# Patient Record
Sex: MALE | Race: WHITE | NOT HISPANIC OR LATINO | Employment: OTHER | URBAN - NONMETROPOLITAN AREA
[De-identification: names, ages, dates, MRNs, and addresses within clinical notes are randomized per-mention and may not be internally consistent; named-entity substitution may affect disease eponyms.]

---

## 2021-12-23 ENCOUNTER — HISTORICAL (OUTPATIENT)
Dept: ADMINISTRATIVE | Facility: HOSPITAL | Age: 66
End: 2021-12-23

## 2023-01-26 ENCOUNTER — HOSPITAL ENCOUNTER (OUTPATIENT)
Dept: RADIOLOGY | Facility: HOSPITAL | Age: 68
Discharge: HOME | End: 2023-01-26
Attending: FAMILY MEDICINE
Payer: MEDICARE

## 2023-01-26 DIAGNOSIS — R15.9 ENCOPRESIS WITHOUT CONSTIPATION AND OVERFLOW INCONTINENCE: ICD-10-CM

## 2023-01-26 PROCEDURE — 74019 RADEX ABDOMEN 2 VIEWS: CPT | Mod: TC

## 2023-02-16 ENCOUNTER — HOSPITAL ENCOUNTER (OUTPATIENT)
Dept: RADIOLOGY | Facility: HOSPITAL | Age: 68
Discharge: HOME | End: 2023-02-16
Attending: SURGERY
Payer: MEDICARE

## 2023-02-16 DIAGNOSIS — I65.22 OCCLUSION OF LEFT CAROTID ARTERY: ICD-10-CM

## 2023-02-16 PROCEDURE — 25500020 PHARM REV CODE 255: Performed by: SURGERY

## 2023-02-16 PROCEDURE — 70498 CT ANGIOGRAPHY NECK: CPT | Mod: TC

## 2023-02-16 RX ADMIN — IOPAMIDOL 100 ML: 755 INJECTION, SOLUTION INTRAVENOUS at 10:02

## 2023-03-01 ENCOUNTER — CLINICAL SUPPORT (OUTPATIENT)
Dept: RESPIRATORY THERAPY | Facility: HOSPITAL | Age: 68
End: 2023-03-01
Attending: SURGERY
Payer: MEDICARE

## 2023-03-01 ENCOUNTER — HOSPITAL ENCOUNTER (OUTPATIENT)
Dept: RADIOLOGY | Facility: HOSPITAL | Age: 68
Discharge: HOME | End: 2023-03-01
Attending: SURGERY
Payer: MEDICARE

## 2023-03-01 DIAGNOSIS — I71.40 ABDOMINAL AORTIC ANEURYSM: ICD-10-CM

## 2023-03-01 DIAGNOSIS — Z01.810 PRE-OPERATIVE CARDIOVASCULAR EXAMINATION: ICD-10-CM

## 2023-03-01 DIAGNOSIS — I65.22 CAROTID STENOSIS, LEFT: ICD-10-CM

## 2023-03-01 DIAGNOSIS — N18.6 END STAGE RENAL DISEASE: ICD-10-CM

## 2023-03-01 PROCEDURE — 93010 ELECTROCARDIOGRAM REPORT: CPT | Mod: ,,, | Performed by: INTERNAL MEDICINE

## 2023-03-01 PROCEDURE — 93005 ELECTROCARDIOGRAM TRACING: CPT

## 2023-03-01 PROCEDURE — 71046 X-RAY EXAM CHEST 2 VIEWS: CPT | Mod: TC

## 2023-03-01 PROCEDURE — 93010 EKG 12-LEAD: ICD-10-PCS | Mod: ,,, | Performed by: INTERNAL MEDICINE

## 2024-06-20 ENCOUNTER — HOSPITAL ENCOUNTER (EMERGENCY)
Facility: HOSPITAL | Age: 69
Discharge: ANOTHER HOSPITAL | End: 2024-06-20
Attending: SURGERY
Payer: MEDICARE

## 2024-06-20 VITALS
OXYGEN SATURATION: 97 % | TEMPERATURE: 98 F | HEIGHT: 72 IN | RESPIRATION RATE: 18 BRPM | DIASTOLIC BLOOD PRESSURE: 74 MMHG | HEART RATE: 84 BPM | WEIGHT: 220.69 LBS | BODY MASS INDEX: 29.89 KG/M2 | SYSTOLIC BLOOD PRESSURE: 113 MMHG

## 2024-06-20 DIAGNOSIS — A41.9 SEPSIS, DUE TO UNSPECIFIED ORGANISM, UNSPECIFIED WHETHER ACUTE ORGAN DYSFUNCTION PRESENT: Primary | ICD-10-CM

## 2024-06-20 DIAGNOSIS — S96.911A STRAIN OF RIGHT ANKLE, INITIAL ENCOUNTER: ICD-10-CM

## 2024-06-20 DIAGNOSIS — R53.1 WEAKNESS: ICD-10-CM

## 2024-06-20 DIAGNOSIS — K52.9 GASTROENTERITIS: ICD-10-CM

## 2024-06-20 DIAGNOSIS — N17.9 ACUTE RENAL FAILURE, UNSPECIFIED ACUTE RENAL FAILURE TYPE: ICD-10-CM

## 2024-06-20 DIAGNOSIS — I48.92 NEW ONSET ATRIAL FLUTTER: ICD-10-CM

## 2024-06-20 LAB
ALBUMIN SERPL-MCNC: 4.5 G/DL (ref 3.4–5)
ALBUMIN/GLOB SERPL: 1.2 RATIO
ALP SERPL-CCNC: 66 UNIT/L (ref 50–144)
ALT SERPL-CCNC: 32 UNIT/L (ref 1–45)
ANION GAP SERPL CALC-SCNC: 16 MEQ/L (ref 2–13)
AST SERPL-CCNC: 39 UNIT/L (ref 17–59)
BASOPHILS # BLD AUTO: 0.07 X10(3)/MCL (ref 0.01–0.08)
BASOPHILS NFR BLD AUTO: 0.4 % (ref 0.1–1.2)
BILIRUB SERPL-MCNC: 0.6 MG/DL (ref 0–1)
BUN SERPL-MCNC: 74 MG/DL (ref 7–20)
CALCIUM SERPL-MCNC: 9.3 MG/DL (ref 8.4–10.2)
CHLORIDE SERPL-SCNC: 105 MMOL/L (ref 98–110)
CO2 SERPL-SCNC: 17 MMOL/L (ref 21–32)
CREAT SERPL-MCNC: 4.49 MG/DL (ref 0.66–1.25)
CREAT/UREA NIT SERPL: 16 (ref 12–20)
EOSINOPHIL # BLD AUTO: 0 X10(3)/MCL (ref 0.04–0.54)
EOSINOPHIL NFR BLD AUTO: 0 % (ref 0.7–7)
ERYTHROCYTE [DISTWIDTH] IN BLOOD BY AUTOMATED COUNT: 17.2 %
GFR SERPLBLD CREATININE-BSD FMLA CKD-EPI: 14 ML/MIN/1.73/M2
GLOBULIN SER-MCNC: 3.8 GM/DL (ref 2–3.9)
GLUCOSE SERPL-MCNC: 140 MG/DL (ref 70–115)
HCT VFR BLD AUTO: 44 % (ref 36–52)
HGB BLD-MCNC: 13.9 G/DL (ref 13–18)
IMM GRANULOCYTES # BLD AUTO: 0.48 X10(3)/MCL (ref 0–0.03)
IMM GRANULOCYTES NFR BLD AUTO: 2.8 % (ref 0–0.5)
LYMPHOCYTES # BLD AUTO: 0.81 X10(3)/MCL (ref 1.32–3.57)
LYMPHOCYTES NFR BLD AUTO: 4.7 % (ref 20–55)
MAGNESIUM SERPL-MCNC: 2.6 MG/DL (ref 1.8–2.4)
MCH RBC QN AUTO: 26 PG (ref 27–34)
MCHC RBC AUTO-ENTMCNC: 31.6 G/DL (ref 31–37)
MCV RBC AUTO: 82.4 FL (ref 79–99)
MONOCYTES # BLD AUTO: 1.18 X10(3)/MCL (ref 0.3–0.82)
MONOCYTES NFR BLD AUTO: 6.8 % (ref 4.7–12.5)
NEUTROPHILS # BLD AUTO: 14.85 X10(3)/MCL (ref 1.78–5.38)
NEUTROPHILS NFR BLD AUTO: 85.3 % (ref 37–73)
NRBC BLD AUTO-RTO: 0 %
PLATELET # BLD AUTO: 195 X10(3)/MCL (ref 140–371)
PMV BLD AUTO: 9.8 FL (ref 9.4–12.4)
POTASSIUM SERPL-SCNC: 5 MMOL/L (ref 3.5–5.1)
PROT SERPL-MCNC: 8.3 GM/DL (ref 6.3–8.2)
RBC # BLD AUTO: 5.34 X10(6)/MCL (ref 4–6)
SODIUM SERPL-SCNC: 138 MMOL/L (ref 136–145)
TROPONIN I SERPL-MCNC: 0.04 NG/ML (ref 0–0.03)
TROPONIN I SERPL-MCNC: 0.04 NG/ML (ref 0–0.03)
WBC # BLD AUTO: 17.39 X10(3)/MCL (ref 4–11.5)

## 2024-06-20 PROCEDURE — 83735 ASSAY OF MAGNESIUM: CPT | Performed by: SURGERY

## 2024-06-20 PROCEDURE — 25000003 PHARM REV CODE 250

## 2024-06-20 PROCEDURE — 93010 ELECTROCARDIOGRAM REPORT: CPT | Mod: ,,, | Performed by: INTERNAL MEDICINE

## 2024-06-20 PROCEDURE — 93005 ELECTROCARDIOGRAM TRACING: CPT

## 2024-06-20 PROCEDURE — 25000003 PHARM REV CODE 250: Performed by: SURGERY

## 2024-06-20 PROCEDURE — 99285 EMERGENCY DEPT VISIT HI MDM: CPT | Mod: 25

## 2024-06-20 PROCEDURE — 96360 HYDRATION IV INFUSION INIT: CPT | Mod: 59

## 2024-06-20 PROCEDURE — 87040 BLOOD CULTURE FOR BACTERIA: CPT | Performed by: SURGERY

## 2024-06-20 PROCEDURE — 96365 THER/PROPH/DIAG IV INF INIT: CPT

## 2024-06-20 PROCEDURE — 85025 COMPLETE CBC W/AUTO DIFF WBC: CPT | Performed by: SURGERY

## 2024-06-20 PROCEDURE — 63600175 PHARM REV CODE 636 W HCPCS: Mod: JZ,JG | Performed by: SURGERY

## 2024-06-20 PROCEDURE — 84484 ASSAY OF TROPONIN QUANT: CPT | Performed by: SURGERY

## 2024-06-20 PROCEDURE — 80053 COMPREHEN METABOLIC PANEL: CPT | Performed by: SURGERY

## 2024-06-20 RX ORDER — FOLIC ACID 1 MG/1
1 TABLET ORAL DAILY
COMMUNITY

## 2024-06-20 RX ORDER — BUSPIRONE HYDROCHLORIDE 5 MG/1
5 TABLET ORAL 2 TIMES DAILY
COMMUNITY

## 2024-06-20 RX ORDER — TRAZODONE HYDROCHLORIDE 50 MG/1
50 TABLET ORAL NIGHTLY
COMMUNITY

## 2024-06-20 RX ORDER — METRONIDAZOLE 500 MG/100ML
500 INJECTION, SOLUTION INTRAVENOUS ONCE
Status: COMPLETED | OUTPATIENT
Start: 2024-06-20 | End: 2024-06-20

## 2024-06-20 RX ORDER — HYDROCODONE BITARTRATE AND ACETAMINOPHEN 5; 325 MG/1; MG/1
1 TABLET ORAL
Status: COMPLETED | OUTPATIENT
Start: 2024-06-20 | End: 2024-06-20

## 2024-06-20 RX ORDER — SODIUM CHLORIDE 9 MG/ML
1000 INJECTION, SOLUTION INTRAVENOUS
Status: COMPLETED | OUTPATIENT
Start: 2024-06-20 | End: 2024-06-20

## 2024-06-20 RX ORDER — LEVOFLOXACIN 500 MG/1
500 TABLET, FILM COATED ORAL ONCE
Status: COMPLETED | OUTPATIENT
Start: 2024-06-20 | End: 2024-06-20

## 2024-06-20 RX ORDER — CLOPIDOGREL BISULFATE 75 MG/1
75 TABLET ORAL DAILY
COMMUNITY

## 2024-06-20 RX ORDER — ROSUVASTATIN CALCIUM 20 MG/1
20 TABLET, COATED ORAL DAILY
COMMUNITY

## 2024-06-20 RX ORDER — FAMOTIDINE 20 MG/1
20 TABLET, FILM COATED ORAL 2 TIMES DAILY
COMMUNITY

## 2024-06-20 RX ORDER — FENOFIBRATE 160 MG/1
160 TABLET ORAL DAILY
COMMUNITY

## 2024-06-20 RX ORDER — SERTRALINE HYDROCHLORIDE 100 MG/1
100 TABLET, FILM COATED ORAL DAILY
COMMUNITY

## 2024-06-20 RX ORDER — LISINOPRIL 40 MG/1
40 TABLET ORAL DAILY
COMMUNITY

## 2024-06-20 RX ORDER — AMLODIPINE BESYLATE 2.5 MG/1
2.5 TABLET ORAL DAILY
COMMUNITY

## 2024-06-20 RX ORDER — HYDROCODONE BITARTRATE AND ACETAMINOPHEN 5; 325 MG/1; MG/1
1 TABLET ORAL EVERY 6 HOURS PRN
COMMUNITY

## 2024-06-20 RX ORDER — SODIUM CHLORIDE 9 MG/ML
INJECTION, SOLUTION INTRAVENOUS CONTINUOUS
Status: DISCONTINUED | OUTPATIENT
Start: 2024-06-20 | End: 2024-06-20

## 2024-06-20 RX ADMIN — LEVOFLOXACIN 500 MG: 500 TABLET, FILM COATED ORAL at 01:06

## 2024-06-20 RX ADMIN — HYDROCODONE BITARTRATE AND ACETAMINOPHEN 1 TABLET: 5; 325 TABLET ORAL at 05:06

## 2024-06-20 RX ADMIN — SODIUM CHLORIDE 1000 ML: 9 INJECTION, SOLUTION INTRAVENOUS at 01:06

## 2024-06-20 RX ADMIN — METRONIDAZOLE 500 MG: 500 INJECTION, SOLUTION INTRAVENOUS at 01:06

## 2024-06-20 NOTE — ED PROVIDER NOTES
"Encounter Date: 6/20/2024       History     Chief Complaint   Patient presents with    Weakness     Pt arrived via EMS w/ complaint of increase in falls and weakness. Presents w/ cardiac rhythm of a flutter. Pt denies any chest pain or syncopal. Pt reports falling twice in 24 hrs w/ diarrhea x 4 days. Pt only complaint of pain is right ankle. HX of CVA w/ right side deficit and L BKA.     Fall    Diarrhea     67 YO WM W/ ACUTE RIGHT ANKLE PAIN S/P MECHANICAL FALL AT 0800 THIS AM.  +ABLE TO BEAR WEIGHT SINCE FALL.  NO HT.  NO LOC.  NO CP.  NO SOB.  NO SYNCOPE/NEAR SYNCOPE.  PATIENT EMPHATICALLY & REPEATEDLY DENIES WEAKNESS.  NO AB PAIN.  +STATED "DIARRHEA FOR 4 DAYS."  NO AB PAIN.  +MULTIPLE BOWEL MOVEMENTS.  NO HEMATOCHEZIA.  NO NV.  +HYPOTENSIVE UPON EMS ARRIVAL        Review of patient's allergies indicates:  No Known Allergies  Past Medical History:   Diagnosis Date    HTN (hypertension)     Mixed hyperlipidemia     Stroke      Past Surgical History:   Procedure Laterality Date    AMPUTATION, LOWER LIMB Left     BKA    CAROTID ENDARTERECTOMY Left     FRACTURE SURGERY      VASCULAR SURGERY       No family history on file.  Social History     Tobacco Use    Smoking status: Former     Types: Cigarettes   Substance Use Topics    Alcohol use: Not Currently    Drug use: Not Currently     Review of Systems   All other systems reviewed and are negative.      Physical Exam     Initial Vitals [06/20/24 1243]   BP Pulse Resp Temp SpO2   (!) 87/52 97 18 97.6 °F (36.4 °C) 98 %      MAP       --         Physical Exam    Nursing note and vitals reviewed.  Constitutional: He appears well-developed and well-nourished. No distress.   DISHEVELD   HENT:   Head: Normocephalic and atraumatic.   Right Ear: External ear normal.   Left Ear: External ear normal.   Nose: Nose normal.   Mouth/Throat: Oropharynx is clear and moist.   Eyes: Conjunctivae and EOM are normal. Pupils are equal, round, and reactive to light.   Neck: Neck supple. "   Normal range of motion.  Cardiovascular:  Normal rate, regular rhythm, normal heart sounds and intact distal pulses.     Exam reveals no gallop.       No murmur heard.  Pulmonary/Chest: Breath sounds normal. No respiratory distress. He has no wheezes. He has no rhonchi. He has no rales.   Abdominal: Abdomen is soft. Bowel sounds are normal. He exhibits no distension. There is no abdominal tenderness. There is no rebound and no guarding.   Musculoskeletal:         General: Tenderness present.      Cervical back: Normal range of motion and neck supple.      Comments: S/P LEFT BKA W/ PROSTHETIC IN PLACE    +PALPATORY TENDERNESS RIGHT SILVERIO-MEDIAL ANKLE  NO BONY ABs  NO LAXITY  ATF/CF LIGAMENTS STABLE/NT  NO ECCHYMOSIS  NO EDEMA  NVI DISTALLY       Neurological: He is alert and oriented to person, place, and time. He has normal strength. No cranial nerve deficit or sensory deficit. GCS score is 15. GCS eye subscore is 4. GCS verbal subscore is 5. GCS motor subscore is 6.   Skin: Skin is warm. Capillary refill takes less than 2 seconds.   Psychiatric: He has a normal mood and affect. His behavior is normal. Judgment and thought content normal.         ED Course   Procedures  Labs Reviewed   COMPREHENSIVE METABOLIC PANEL - Abnormal; Notable for the following components:       Result Value    CO2 17 (*)     Glucose 140 (*)     Blood Urea Nitrogen 74 (*)     Creatinine 4.49 (*)     Protein Total 8.3 (*)     Anion Gap 16.0 (*)     All other components within normal limits   TROPONIN I - Abnormal; Notable for the following components:    Troponin-I 0.045 (*)     All other components within normal limits   CBC WITH DIFFERENTIAL - Abnormal; Notable for the following components:    WBC 17.39 (*)     MCH 26.0 (*)     Neut % 85.3 (*)     Lymph % 4.7 (*)     Eos % 0.0 (*)     Lymph # 0.81 (*)     Neut # 14.85 (*)     Mono # 1.18 (*)     Eos # 0.00 (*)     IG# 0.48 (*)     IG% 2.8 (*)     All other components within normal limits    TROPONIN I - Abnormal; Notable for the following components:    Troponin-I 0.043 (*)     All other components within normal limits   MAGNESIUM - Abnormal; Notable for the following components:    Magnesium Level 2.60 (*)     All other components within normal limits   BLOOD CULTURE OLG   BLOOD CULTURE [facility]   CBC W/ AUTO DIFFERENTIAL    Narrative:     The following orders were created for panel order CBC Auto Differential.  Procedure                               Abnormality         Status                     ---------                               -----------         ------                     CBC with Differential[[id number]]       Abnormal            Final result                 Please view results for these tests on the individual orders.     EKG Readings: (Independently Interpreted)   Previous EKG: Compared with most recent EKG Previous EKG Date: 03/01/2023. Heart Rate: 90. Ectopy: No Ectopy. Conduction: 1st Degree AV Block. T Waves: Normal.   SINUS FOCUS W/ 1st AVB  NO ECTOPY   - PROLONGED  QRS WNL  QTc WNL    +CHANGED FROM EKG ON 03/01/2023     +REPEAT EKG W/ NEW ONSET AFLUTTER W/ RATE 82 bpm  NONSPECIFIC T WAVE CHANGES V4-V6    Imaging Results              X-Ray Ankle 2 View Right (Final result)  Result time 06/20/24 14:04:58      Final result by Shaquille Garland III, MD (06/20/24 14:04:58)                   Impression:      1. No significant abnormalities are noted.      Electronically signed by: Shaquille Garland  Date:    06/20/2024  Time:    14:04               Narrative:    EXAMINATION:  STUDY: XR ANKLE 2 VIEW RIGHT    CLINICAL HISTORY AND TECHNIQUE:  Weakness, trauma    COMPARISON:  12/23/2021    FINDINGS:  I see no definite fractures, dislocations, or other significant abnormalities. The right ankle mortise appears intact.                                       Medications   levoFLOXacin tablet 500 mg (500 mg Oral Given 6/20/24 1359)   metronidazole IVPB 500 mg (0 mg Intravenous Stopped 6/20/24 1512)    0.9%  NaCl infusion (0 mLs Intravenous Stopped 6/20/24 1500)     Medical Decision Making             ED Course as of 06/20/24 1638   Thu Jun 20, 2024   1327 WBC(!): 17.39 [WV]   1327 Neut %(!): 85.3 [WV]   1402 BUN(!): 74 [WV]   1402 Creatinine(!): 4.49 [WV]   1402 CO2(!): 17 [WV]   1402 Troponin I(!): 0.045 [WV]   1419 ACCEPTED BY DR FOSTER  [WV]   1629 DR DE LEÓN, CARDIOLOGY, AFLUTTER SHOULD POSSIBLY RESOLVE W/ TREATMENT OF UNDERLYING CONDITION.  IF PERSISTENT, THEN F/U W/ CARDIOLOGIST.   [WV]   1635 ACCEPTED BY DR TRUONG AT [facility] [[place]]      ED Course User Index  [WV] Fritz Reinoso MD                         TOTAL CRITICAL CARE TIME:  120 minutes    Clinical Impression:  Final diagnoses:  [R53.1] Weakness  [A41.9] Sepsis, due to unspecified organism, unspecified whether acute organ dysfunction present (Primary)  [K52.9] Gastroenteritis  [N17.9] Acute renal failure, unspecified acute renal failure type  [S96.911A] Strain of right ankle, initial encounter  [I48.92] New onset atrial flutter          ED Disposition Condition    Transfer to Another Facility Stable                Fritz Reinoso MD  06/20/24 1421       Fritz Reinoso MD  06/20/24 0089

## 2024-06-20 NOTE — TELEMEDICINE CONSULT
[facility]    [facility]iology  Consult Note    Patient Name: Onesimo Booth  MRN: [id number]  Admission Date: 6/20/2024  Hospital Length of Stay: 0 days  Code Status: No Order   Attending Provider: Fritz Reinoso MD   Consulting Provider: Ezra Sheehan MD  Primary Care Physician: Denys Mckeon III, MD  Principal Problem: atrial flutter    Patient information was obtained from patient and ER records.     Subjective:     Chief Complaint/Reason for Consult: atrial flutter    HPI:     Weakness        Pt arrived via EMS w/ complaint of increase in falls and weakness. Presents w/ cardiac rhythm of a flutter. Pt denies any chest pain or syncopal. Pt reports falling twice in 24 hrs w/ diarrhea x 4 days. Pt only complaint of pain is right ankle. HX of CVA w/ right side deficit and L BKA.     Fall    Diarrhea      67 YO WM W/ ACUTE RIGHT ANKLE PAIN S/P MECHANICAL FALL AT 0800 THIS AM.  +ABLE TO BEAR WEIGHT SINCE FALL.  NO HT.  NO LOC.  NO CP.  NO SOB.  NO SYNCOPE/NEAR SYNCOPE.  PATIENT EMPHATICALLY & REPEATEDLY DENIES WEAKNESS.  NO AB PAIN.  +STATED "DIARRHEA FOR 4 DAYS."  NO AB PAIN.  +MULTIPLE BOWEL MOVEMENTS.  NO HEMATOCHEZIA.  NO NV.  +HYPOTENSIVE UPON EMS ARRIVAL patient has developed new onset atrial flutter.  He is rate controlled and asymptomatic.  Patient has no history of atrial flutter but does have a history of a CVA.  The patient is currently being treated for sepsis.  He was fluid resuscitated on arrival.  Heart rate is 79 beats per minute asymptomatic           Previous Cardiac Diagnostics:   EKG    Past Medical History:   Diagnosis Date    HTN (hypertension)     Mixed hyperlipidemia     Stroke      Past Surgical History:   Procedure Laterality Date    AMPUTATION, LOWER LIMB Left     BKA    CAROTID ENDARTERECTOMY Left     FRACTURE SURGERY      VASCULAR SURGERY       Review of patient's allergies indicates:  No Known Allergies  No current facility-administered medications " on file prior to encounter.     Current Outpatient Medications on File Prior to Encounter   Medication Sig    amLODIPine (NORVASC) 2.5 MG tablet Take 2.5 mg by mouth once daily.    busPIRone (BUSPAR) 5 MG Tab Take 5 mg by mouth 2 (two) times daily.    clopidogreL (PLAVIX) 75 mg tablet Take 75 mg by mouth once daily.    famotidine (PEPCID) 20 MG tablet Take 20 mg by mouth 2 (two) times daily.    fenofibrate 160 MG Tab Take 160 mg by mouth once daily.    folic acid (FOLVITE) 1 MG tablet Take 1 mg by mouth once daily.    HYDROcodone-acetaminophen (NORCO) 5-325 mg per tablet Take 1 tablet by mouth every 6 (six) hours as needed for Pain.    lisinopriL (PRINIVIL,ZESTRIL) 40 MG tablet Take 40 mg by mouth once daily.    rosuvastatin (CRESTOR) 20 MG tablet Take 20 mg by mouth once daily.    sertraline (ZOLOFT) 100 MG tablet Take 100 mg by mouth once daily.    traZODone (DESYREL) 50 MG tablet Take 50 mg by mouth every evening.     Family History    None       Tobacco Use    Smoking status: Former     Types: Cigarettes    Smokeless tobacco: Not on file   Substance and Sexual Activity    Alcohol use: Not Currently    Drug use: Not Currently    Sexual activity: Not on file       Review of Systems   Respiratory:  Negative for apnea, cough, choking, chest tightness, shortness of breath, wheezing and stridor.    Cardiovascular:  Negative for chest pain, palpitations and leg swelling.   Gastrointestinal:  Positive for diarrhea and nausea.   All other systems reviewed and are negative.    Objective:     Vital Signs (Most Recent):  Temp: 97.7 °F (36.5 °C) (06/20/24 1645)  Pulse: 78 (06/20/24 1645)  Resp: 20 (06/20/24 1645)  BP: (!) 94/52 (06/20/24 1645)  SpO2: 98 % (06/20/24 1645) Vital Signs (24h Range):  Temp:  [97.5 °F (36.4 °C)-97.8 °F (36.6 °C)] 97.7 °F (36.5 °C)  Pulse:  [72-97] 78  Resp:  [13-21] 20  SpO2:  [95 %-100 %] 98 %  BP: ()/(51-84) 94/52   Weight: 100.1 kg (220 lb 10.9 oz)  Body mass index is 29.93 kg/m².  SpO2:  98 %     No intake or output data in the 24 hours ending 06/20/24 1653  Lines/Drains/Airways       Peripheral Intravenous Line  Duration                  Peripheral IV - Single Lumen 06/20/24 1255 20 G Left;Posterior Forearm <1 day         Peripheral IV - Single Lumen 06/20/24 1317 20 G Left Antecubital <1 day                  Significant Labs:  Recent Results (from the past 72 hour(s))   Comprehensive Metabolic Panel    Collection Time: 06/20/24  1:07 PM   Result Value Ref Range    Sodium 138 136 - 145 mmol/L    Potassium 5.0 3.5 - 5.1 mmol/L    Chloride 105 98 - 110 mmol/L    CO2 17 (L) 21 - 32 mmol/L    Glucose 140 (H) 70 - 115 mg/dL    Blood Urea Nitrogen 74 (H) 7.0 - 20.0 mg/dL    Creatinine 4.49 (H) 0.66 - 1.25 mg/dL    Calcium 9.3 8.4 - 10.2 mg/dL    Protein Total 8.3 (H) 6.3 - 8.2 gm/dL    Albumin 4.5 3.4 - 5.0 g/dL    Globulin 3.8 2.0 - 3.9 gm/dL    Albumin/Globulin Ratio 1.2 ratio    Bilirubin Total 0.6 0.0 - 1.0 mg/dL    ALP 66 50 - 144 unit/L    ALT 32 1 - 45 unit/L    AST 39 17 - 59 unit/L    eGFR 14 mL/min/1.73/m2    Anion Gap 16.0 (H) 2.0 - 13.0 mEq/L    BUN/Creatinine Ratio 16 12 - 20   Troponin I    Collection Time: 06/20/24  1:07 PM   Result Value Ref Range    Troponin-I 0.045 (H) 0.000 - 0.034 ng/mL   CBC with Differential    Collection Time: 06/20/24  1:07 PM   Result Value Ref Range    WBC 17.39 (H) 4.00 - 11.50 x10(3)/mcL    RBC 5.34 4.00 - 6.00 x10(6)/mcL    Hgb 13.9 13.0 - 18.0 g/dL    Hct 44.0 36.0 - 52.0 %    MCV 82.4 79.0 - 99.0 fL    MCH 26.0 (L) 27.0 - 34.0 pg    MCHC 31.6 31.0 - 37.0 g/dL    RDW 17.2 %    Platelet 195 140 - 371 x10(3)/mcL    MPV 9.8 9.4 - 12.4 fL    Neut % 85.3 (H) 37 - 73 %    Lymph % 4.7 (L) 20 - 55 %    Mono % 6.8 4.7 - 12.5 %    Eos % 0.0 (L) 0.7 - 7 %    Basophil % 0.4 0.1 - 1.2 %    Lymph # 0.81 (L) 1.32 - 3.57 x10(3)/mcL    Neut # 14.85 (H) 1.78 - 5.38 x10(3)/mcL    Mono # 1.18 (H) 0.3 - 0.82 x10(3)/mcL    Eos # 0.00 (L) 0.04 - 0.54 x10(3)/mcL    Baso # 0.07 0.01  - 0.08 x10(3)/mcL    IG# 0.48 (H) 0.0001 - 0.031 x10(3)/mcL    IG% 2.8 (H) 0 - 0.5 %    NRBC% 0.0 <=1 %   Troponin I    Collection Time: 06/20/24  2:46 PM   Result Value Ref Range    Troponin-I 0.043 (H) 0.000 - 0.034 ng/mL   Magnesium    Collection Time: 06/20/24  2:46 PM   Result Value Ref Range    Magnesium Level 2.60 (H) 1.80 - 2.40 mg/dL     Significant Imaging:  Imaging Results              X-Ray Ankle 2 View Right (Final result)  Result time 06/20/24 14:04:58      Final result by Shaquille Garland III, MD (06/20/24 14:04:58)                   Impression:      1. No significant abnormalities are noted.      Electronically signed by: Shaquille Garland  Date:    06/20/2024  Time:    14:04               Narrative:    EXAMINATION:  STUDY: XR ANKLE 2 VIEW RIGHT    CLINICAL HISTORY AND TECHNIQUE:  Weakness, trauma    COMPARISON:  12/23/2021    FINDINGS:  I see no definite fractures, dislocations, or other significant abnormalities. The right ankle mortise appears intact.                                    EKG: atrial flutter 79 BPM    Telemetry:  atrial flutter 79 BPM    Physical Exam  Constitutional:       Appearance: Normal appearance. He is obese. He is ill-appearing.   HENT:      Head: Normocephalic.   Eyes:      Pupils: Pupils are equal, round, and reactive to light.   Cardiovascular:      Rate and Rhythm: Normal rate. Rhythm irregular.      Heart sounds: Normal heart sounds. No murmur heard.     No friction rub. No gallop.   Pulmonary:      Effort: Pulmonary effort is normal.      Breath sounds: Normal breath sounds.   Abdominal:      Palpations: Abdomen is soft.   Musculoskeletal:         General: No swelling.      Left lower leg: No edema.      Comments: RBKA   Neurological:      Mental Status: He is alert.           Home Medications:   No current facility-administered medications on file prior to encounter.     Current Outpatient Medications on File Prior to Encounter   Medication Sig Dispense Refill    amLODIPine  (NORVASC) 2.5 MG tablet Take 2.5 mg by mouth once daily.      busPIRone (BUSPAR) 5 MG Tab Take 5 mg by mouth 2 (two) times daily.      clopidogreL (PLAVIX) 75 mg tablet Take 75 mg by mouth once daily.      famotidine (PEPCID) 20 MG tablet Take 20 mg by mouth 2 (two) times daily.      fenofibrate 160 MG Tab Take 160 mg by mouth once daily.      folic acid (FOLVITE) 1 MG tablet Take 1 mg by mouth once daily.      HYDROcodone-acetaminophen (NORCO) 5-325 mg per tablet Take 1 tablet by mouth every 6 (six) hours as needed for Pain.      lisinopriL (PRINIVIL,ZESTRIL) 40 MG tablet Take 40 mg by mouth once daily.      rosuvastatin (CRESTOR) 20 MG tablet Take 20 mg by mouth once daily.      sertraline (ZOLOFT) 100 MG tablet Take 100 mg by mouth once daily.      traZODone (DESYREL) 50 MG tablet Take 50 mg by mouth every evening.       Current Inpatient Medications:  No current facility-administered medications for this encounter.    Current Outpatient Medications:     amLODIPine (NORVASC) 2.5 MG tablet, Take 2.5 mg by mouth once daily., Disp: , Rfl:     busPIRone (BUSPAR) 5 MG Tab, Take 5 mg by mouth 2 (two) times daily., Disp: , Rfl:     clopidogreL (PLAVIX) 75 mg tablet, Take 75 mg by mouth once daily., Disp: , Rfl:     famotidine (PEPCID) 20 MG tablet, Take 20 mg by mouth 2 (two) times daily., Disp: , Rfl:     fenofibrate 160 MG Tab, Take 160 mg by mouth once daily., Disp: , Rfl:     folic acid (FOLVITE) 1 MG tablet, Take 1 mg by mouth once daily., Disp: , Rfl:     HYDROcodone-acetaminophen (NORCO) 5-325 mg per tablet, Take 1 tablet by mouth every 6 (six) hours as needed for Pain., Disp: , Rfl:     lisinopriL (PRINIVIL,ZESTRIL) 40 MG tablet, Take 40 mg by mouth once daily., Disp: , Rfl:     rosuvastatin (CRESTOR) 20 MG tablet, Take 20 mg by mouth once daily., Disp: , Rfl:     sertraline (ZOLOFT) 100 MG tablet, Take 100 mg by mouth once daily., Disp: , Rfl:     traZODone (DESYREL) 50 MG tablet, Take 50 mg by mouth every  evening., Disp: , Rfl:   VTE Risk Mitigation (From admission, onward)      None          Assessment:   Patient is a 68-year-old male who presents with sepsis possible GI source.  He has been successfully resuscitated fluid wise.    .  He is scheduled to be transferred to higher level of care.        Plan:     Atrial flutter appears to be new onset.  He is currently rate controlled.  Given that he appears to be septic this is likely contributing to the arrhythmia.  My hope is that he would convert on his own.  If not he can be rate controlled and started on anticoagulation and be seen by Cardiology as an outpatient for possible cardioversion.                           A minimum of two characteristics is recommended for diagnosis of either severe or non-severe malnutrition.    Thank you for your consult.     Ezra Sheehan MD  Cardiology  [facility]  06/20/2024

## 2024-06-21 LAB
OHS QRS DURATION: 80 MS
OHS QRS DURATION: 86 MS
OHS QTC CALCULATION: 381 MS
OHS QTC CALCULATION: 460 MS

## 2024-06-21 NOTE — ED NOTES
[facility] called stating they did not receive report, Lori DUNCAN gave report to Geovanna. Geovanna denied any further questions/concerns.

## 2024-06-25 LAB
BACTERIA BLD CULT: NORMAL
BACTERIA BLD CULT: NORMAL

## 2024-11-14 ENCOUNTER — LAB REQUISITION (OUTPATIENT)
Dept: LAB | Facility: HOSPITAL | Age: 69
End: 2024-11-14
Payer: MEDICARE

## 2024-11-14 DIAGNOSIS — I11.0 HYPERTENSIVE HEART DISEASE WITH HEART FAILURE: ICD-10-CM

## 2024-11-14 DIAGNOSIS — R68.89 OTHER GENERAL SYMPTOMS AND SIGNS: ICD-10-CM

## 2024-11-14 DIAGNOSIS — R79.9 ABNORMAL FINDING OF BLOOD CHEMISTRY, UNSPECIFIED: ICD-10-CM

## 2024-11-14 LAB
ALBUMIN SERPL-MCNC: 4.2 G/DL (ref 3.4–5)
ALBUMIN/GLOB SERPL: 1.4 RATIO
ALP SERPL-CCNC: 82 UNIT/L (ref 50–144)
ALT SERPL-CCNC: 44 UNIT/L (ref 1–45)
ANION GAP SERPL CALC-SCNC: 7 MEQ/L (ref 2–13)
AST SERPL-CCNC: 50 UNIT/L (ref 17–59)
BASOPHILS # BLD AUTO: 0.04 X10(3)/MCL (ref 0.01–0.08)
BASOPHILS NFR BLD AUTO: 0.4 % (ref 0.1–1.2)
BILIRUB SERPL-MCNC: 0.7 MG/DL (ref 0–1)
BNP BLD-MCNC: 151 PG/ML (ref 0–124.9)
BUN SERPL-MCNC: 16 MG/DL (ref 7–20)
CALCIUM SERPL-MCNC: 9.7 MG/DL (ref 8.4–10.2)
CHLORIDE SERPL-SCNC: 101 MMOL/L (ref 98–110)
CO2 SERPL-SCNC: 27 MMOL/L (ref 21–32)
CREAT SERPL-MCNC: 0.79 MG/DL (ref 0.66–1.25)
CREAT/UREA NIT SERPL: 20 (ref 12–20)
EOSINOPHIL # BLD AUTO: 0.04 X10(3)/MCL (ref 0.04–0.54)
EOSINOPHIL NFR BLD AUTO: 0.4 % (ref 0.7–7)
ERYTHROCYTE [DISTWIDTH] IN BLOOD BY AUTOMATED COUNT: 15.3 %
EST. AVERAGE GLUCOSE BLD GHB EST-MCNC: 174.3 MG/DL (ref 70–115)
GFR SERPLBLD CREATININE-BSD FMLA CKD-EPI: >90 ML/MIN/1.73/M2
GLOBULIN SER-MCNC: 2.9 GM/DL (ref 2–3.9)
GLUCOSE SERPL-MCNC: 232 MG/DL (ref 70–115)
HBA1C MFR BLD: 7.7 % (ref 4–6)
HCT VFR BLD AUTO: 42.3 % (ref 36–52)
HGB BLD-MCNC: 13.2 G/DL (ref 13–18)
IMM GRANULOCYTES # BLD AUTO: 0.16 X10(3)/MCL (ref 0–0.03)
IMM GRANULOCYTES NFR BLD AUTO: 1.6 % (ref 0–0.5)
LYMPHOCYTES # BLD AUTO: 0.85 X10(3)/MCL (ref 1.32–3.57)
LYMPHOCYTES NFR BLD AUTO: 8.3 % (ref 20–55)
MCH RBC QN AUTO: 27.2 PG (ref 27–34)
MCHC RBC AUTO-ENTMCNC: 31.2 G/DL (ref 31–37)
MCV RBC AUTO: 87 FL (ref 79–99)
MONOCYTES # BLD AUTO: 1.17 X10(3)/MCL (ref 0.3–0.82)
MONOCYTES NFR BLD AUTO: 11.4 % (ref 4.7–12.5)
NEUTROPHILS # BLD AUTO: 8.04 X10(3)/MCL (ref 1.78–5.38)
NEUTROPHILS NFR BLD AUTO: 77.9 % (ref 37–73)
PLATELET # BLD AUTO: 109 X10(3)/MCL (ref 140–371)
PMV BLD AUTO: 10.7 FL (ref 9.4–12.4)
POTASSIUM SERPL-SCNC: 5 MMOL/L (ref 3.5–5.1)
PROT SERPL-MCNC: 7.1 GM/DL (ref 6.3–8.2)
RBC # BLD AUTO: 4.86 X10(6)/MCL (ref 4–6)
SODIUM SERPL-SCNC: 135 MMOL/L (ref 136–145)
WBC # BLD AUTO: 10.3 X10(3)/MCL (ref 4–11.5)

## 2024-11-14 PROCEDURE — 85025 COMPLETE CBC W/AUTO DIFF WBC: CPT | Performed by: INTERNAL MEDICINE

## 2024-11-14 PROCEDURE — 80053 COMPREHEN METABOLIC PANEL: CPT | Performed by: INTERNAL MEDICINE

## 2024-11-14 PROCEDURE — 83880 ASSAY OF NATRIURETIC PEPTIDE: CPT | Performed by: INTERNAL MEDICINE

## 2024-11-14 PROCEDURE — 83036 HEMOGLOBIN GLYCOSYLATED A1C: CPT | Performed by: INTERNAL MEDICINE

## 2025-05-03 ENCOUNTER — LAB REQUISITION (OUTPATIENT)
Dept: LAB | Facility: HOSPITAL | Age: 70
End: 2025-05-03
Payer: MEDICARE

## 2025-05-03 DIAGNOSIS — R79.89 OTHER SPECIFIED ABNORMAL FINDINGS OF BLOOD CHEMISTRY: ICD-10-CM

## 2025-05-03 LAB — BNP BLD-MCNC: 302.8 PG/ML

## 2025-05-03 PROCEDURE — 83880 ASSAY OF NATRIURETIC PEPTIDE: CPT | Performed by: INTERNAL MEDICINE

## 2025-05-05 ENCOUNTER — LAB REQUISITION (OUTPATIENT)
Dept: LAB | Facility: HOSPITAL | Age: 70
End: 2025-05-05
Payer: MEDICARE

## 2025-05-05 ENCOUNTER — HOSPITAL ENCOUNTER (INPATIENT)
Facility: HOSPITAL | Age: 70
LOS: 6 days | DRG: 377 | End: 2025-05-12
Admitting: FAMILY MEDICINE
Payer: MEDICARE

## 2025-05-05 DIAGNOSIS — R68.89 OTHER GENERAL SYMPTOMS AND SIGNS: ICD-10-CM

## 2025-05-05 DIAGNOSIS — R06.02 SOB (SHORTNESS OF BREATH): ICD-10-CM

## 2025-05-05 DIAGNOSIS — D68.9 COAGULOPATHY: ICD-10-CM

## 2025-05-05 DIAGNOSIS — D64.9 ACUTE ANEMIA: Primary | ICD-10-CM

## 2025-05-05 LAB
ABO + RH BLD: NORMAL
ABORH RETYPE: NORMAL
ALBUMIN SERPL-MCNC: 4 G/DL (ref 3.4–5)
ALBUMIN/GLOB SERPL: 1.4 RATIO
ALP SERPL-CCNC: 61 UNIT/L (ref 50–144)
ALT SERPL-CCNC: 24 UNIT/L (ref 1–45)
ANION GAP SERPL CALC-SCNC: 3 MEQ/L (ref 2–13)
ANISOCYTOSIS BLD QL SMEAR: ABNORMAL
AST SERPL-CCNC: 27 UNIT/L (ref 17–59)
BASOPHILS # BLD AUTO: 0.03 X10(3)/MCL (ref 0.01–0.08)
BASOPHILS NFR BLD AUTO: 0.4 % (ref 0.1–1.2)
BILIRUB SERPL-MCNC: 0.4 MG/DL (ref 0–1)
BLD PROD TYP BPU: NORMAL
BLOOD UNIT EXPIRATION DATE: NORMAL
BLOOD UNIT TYPE CODE: 7300
BUN SERPL-MCNC: 34 MG/DL (ref 7–20)
CALCIUM SERPL-MCNC: 8.3 MG/DL (ref 8.4–10.2)
CHLORIDE SERPL-SCNC: 108 MMOL/L (ref 98–110)
CO2 SERPL-SCNC: 29 MMOL/L (ref 21–32)
CREAT SERPL-MCNC: 0.86 MG/DL (ref 0.66–1.25)
CREAT/UREA NIT SERPL: 40 (ref 12–20)
CROSSMATCH INTERPRETATION: NORMAL
DISPENSE STATUS: NORMAL
EOSINOPHIL # BLD AUTO: 0.07 X10(3)/MCL (ref 0.04–0.54)
EOSINOPHIL NFR BLD AUTO: 0.8 % (ref 0.7–7)
ERYTHROCYTE [DISTWIDTH] IN BLOOD BY AUTOMATED COUNT: 17.2 %
GFR SERPLBLD CREATININE-BSD FMLA CKD-EPI: >90 ML/MIN/1.73/M2
GLOBULIN SER-MCNC: 2.9 GM/DL (ref 2–3.9)
GLUCOSE SERPL-MCNC: 140 MG/DL (ref 70–115)
GROUP & RH: NORMAL
HCT VFR BLD AUTO: 25.1 % (ref 36–52)
HCT VFR BLD AUTO: 25.9 % (ref 36–52)
HGB BLD-MCNC: 6.5 G/DL (ref 13–18)
HGB BLD-MCNC: 7 G/DL (ref 13–18)
HYPOCHROMIA BLD QL SMEAR: ABNORMAL
IMM GRANULOCYTES # BLD AUTO: 0.19 X10(3)/MCL (ref 0–0.03)
IMM GRANULOCYTES NFR BLD AUTO: 2.2 % (ref 0–0.5)
INDIRECT COOMBS: NORMAL
LYMPHOCYTES # BLD AUTO: 0.95 X10(3)/MCL (ref 1.32–3.57)
LYMPHOCYTES NFR BLD AUTO: 11.2 % (ref 20–55)
MCH RBC QN AUTO: 21.9 PG (ref 27–34)
MCHC RBC AUTO-ENTMCNC: 25.9 G/DL (ref 31–37)
MCV RBC AUTO: 84.5 FL (ref 79–99)
MONOCYTES # BLD AUTO: 0.98 X10(3)/MCL (ref 0.3–0.82)
MONOCYTES NFR BLD AUTO: 11.6 % (ref 4.7–12.5)
NEUTROPHILS # BLD AUTO: 6.25 X10(3)/MCL (ref 1.78–5.38)
NEUTROPHILS NFR BLD AUTO: 73.8 % (ref 37–73)
NRBC BLD AUTO-RTO: 1.1 %
PLATELET # BLD AUTO: 151 X10(3)/MCL (ref 140–371)
PLATELET # BLD EST: NORMAL 10*3/UL
PMV BLD AUTO: 11.2 FL (ref 9.4–12.4)
POTASSIUM SERPL-SCNC: 4.4 MMOL/L (ref 3.5–5.1)
PROT SERPL-MCNC: 6.9 GM/DL (ref 6.3–8.2)
RBC # BLD AUTO: 2.97 X10(6)/MCL (ref 4–6)
RBC MORPH BLD: ABNORMAL
SODIUM SERPL-SCNC: 140 MMOL/L (ref 136–145)
SPECIMEN OUTDATE: NORMAL
UNIT NUMBER: NORMAL
WBC # BLD AUTO: 8.47 X10(3)/MCL (ref 4–11.5)

## 2025-05-05 PROCEDURE — 94761 N-INVAS EAR/PLS OXIMETRY MLT: CPT

## 2025-05-05 PROCEDURE — 96376 TX/PRO/DX INJ SAME DRUG ADON: CPT

## 2025-05-05 PROCEDURE — 96365 THER/PROPH/DIAG IV INF INIT: CPT

## 2025-05-05 PROCEDURE — 63600175 PHARM REV CODE 636 W HCPCS

## 2025-05-05 PROCEDURE — 36430 TRANSFUSION BLD/BLD COMPNT: CPT

## 2025-05-05 PROCEDURE — 25000003 PHARM REV CODE 250

## 2025-05-05 PROCEDURE — P9016 RBC LEUKOCYTES REDUCED: HCPCS

## 2025-05-05 PROCEDURE — 99285 EMERGENCY DEPT VISIT HI MDM: CPT | Mod: 25

## 2025-05-05 PROCEDURE — 96366 THER/PROPH/DIAG IV INF ADDON: CPT

## 2025-05-05 PROCEDURE — 27000221 HC OXYGEN, UP TO 24 HOURS

## 2025-05-05 PROCEDURE — 83540 ASSAY OF IRON: CPT | Performed by: INTERNAL MEDICINE

## 2025-05-05 PROCEDURE — 86901 BLOOD TYPING SEROLOGIC RH(D): CPT

## 2025-05-05 PROCEDURE — G0378 HOSPITAL OBSERVATION PER HR: HCPCS

## 2025-05-05 PROCEDURE — 86923 COMPATIBILITY TEST ELECTRIC: CPT

## 2025-05-05 PROCEDURE — 80053 COMPREHEN METABOLIC PANEL: CPT

## 2025-05-05 PROCEDURE — 30233N1 TRANSFUSION OF NONAUTOLOGOUS RED BLOOD CELLS INTO PERIPHERAL VEIN, PERCUTANEOUS APPROACH: ICD-10-PCS | Performed by: SURGERY

## 2025-05-05 PROCEDURE — 85025 COMPLETE CBC W/AUTO DIFF WBC: CPT | Performed by: INTERNAL MEDICINE

## 2025-05-05 PROCEDURE — 85018 HEMOGLOBIN: CPT

## 2025-05-05 RX ORDER — AMLODIPINE BESYLATE 5 MG/1
10 TABLET ORAL DAILY
Status: DISCONTINUED | OUTPATIENT
Start: 2025-05-06 | End: 2025-05-05

## 2025-05-05 RX ORDER — HYDRALAZINE HYDROCHLORIDE 25 MG/1
25 TABLET, FILM COATED ORAL 2 TIMES DAILY
Status: ON HOLD | COMMUNITY
End: 2025-05-09 | Stop reason: CLARIF

## 2025-05-05 RX ORDER — ONDANSETRON HYDROCHLORIDE 2 MG/ML
4 INJECTION, SOLUTION INTRAVENOUS EVERY 8 HOURS PRN
Status: DISCONTINUED | OUTPATIENT
Start: 2025-05-05 | End: 2025-05-12 | Stop reason: HOSPADM

## 2025-05-05 RX ORDER — IBUPROFEN 200 MG
16 TABLET ORAL
Status: DISCONTINUED | OUTPATIENT
Start: 2025-05-05 | End: 2025-05-12 | Stop reason: HOSPADM

## 2025-05-05 RX ORDER — ERGOCALCIFEROL 1.25 MG/1
50000 CAPSULE ORAL
COMMUNITY

## 2025-05-05 RX ORDER — SODIUM CHLORIDE 0.9 % (FLUSH) 0.9 %
10 SYRINGE (ML) INJECTION
Status: DISCONTINUED | OUTPATIENT
Start: 2025-05-05 | End: 2025-05-12 | Stop reason: HOSPADM

## 2025-05-05 RX ORDER — METOPROLOL TARTRATE 25 MG/1
12.5 TABLET ORAL 2 TIMES DAILY
Status: DISCONTINUED | OUTPATIENT
Start: 2025-05-05 | End: 2025-05-09

## 2025-05-05 RX ORDER — GABAPENTIN 300 MG/1
300 CAPSULE ORAL 3 TIMES DAILY
Status: DISCONTINUED | OUTPATIENT
Start: 2025-05-05 | End: 2025-05-12 | Stop reason: HOSPADM

## 2025-05-05 RX ORDER — AMLODIPINE BESYLATE 5 MG/1
5 TABLET ORAL DAILY
Status: DISCONTINUED | OUTPATIENT
Start: 2025-05-06 | End: 2025-05-09

## 2025-05-05 RX ORDER — FOLIC ACID 1 MG/1
1 TABLET ORAL DAILY
Status: DISCONTINUED | OUTPATIENT
Start: 2025-05-06 | End: 2025-05-12 | Stop reason: HOSPADM

## 2025-05-05 RX ORDER — TRAZODONE HYDROCHLORIDE 50 MG/1
50 TABLET ORAL NIGHTLY
Status: DISCONTINUED | OUTPATIENT
Start: 2025-05-05 | End: 2025-05-12 | Stop reason: HOSPADM

## 2025-05-05 RX ORDER — SERTRALINE HYDROCHLORIDE 50 MG/1
100 TABLET, FILM COATED ORAL DAILY
Status: DISCONTINUED | OUTPATIENT
Start: 2025-05-06 | End: 2025-05-12 | Stop reason: HOSPADM

## 2025-05-05 RX ORDER — METFORMIN HYDROCHLORIDE 1000 MG/1
1000 TABLET ORAL 2 TIMES DAILY WITH MEALS
COMMUNITY

## 2025-05-05 RX ORDER — TALC
6 POWDER (GRAM) TOPICAL NIGHTLY PRN
Status: DISCONTINUED | OUTPATIENT
Start: 2025-05-05 | End: 2025-05-12 | Stop reason: HOSPADM

## 2025-05-05 RX ORDER — FENOFIBRATE 145 MG/1
145 TABLET, FILM COATED ORAL DAILY
Status: DISCONTINUED | OUTPATIENT
Start: 2025-05-06 | End: 2025-05-12 | Stop reason: HOSPADM

## 2025-05-05 RX ORDER — PROCHLORPERAZINE EDISYLATE 5 MG/ML
5 INJECTION INTRAMUSCULAR; INTRAVENOUS EVERY 6 HOURS PRN
Status: DISCONTINUED | OUTPATIENT
Start: 2025-05-05 | End: 2025-05-12 | Stop reason: HOSPADM

## 2025-05-05 RX ORDER — BUSPIRONE HYDROCHLORIDE 5 MG/1
5 TABLET ORAL 2 TIMES DAILY
Status: DISCONTINUED | OUTPATIENT
Start: 2025-05-05 | End: 2025-05-09

## 2025-05-05 RX ORDER — HYDROCODONE BITARTRATE AND ACETAMINOPHEN 500; 5 MG/1; MG/1
TABLET ORAL
Status: DISCONTINUED | OUTPATIENT
Start: 2025-05-05 | End: 2025-05-12 | Stop reason: HOSPADM

## 2025-05-05 RX ORDER — FUROSEMIDE 40 MG/1
40 TABLET ORAL DAILY
COMMUNITY

## 2025-05-05 RX ORDER — PANTOPRAZOLE SODIUM 40 MG/10ML
80 INJECTION, POWDER, LYOPHILIZED, FOR SOLUTION INTRAVENOUS
Status: COMPLETED | OUTPATIENT
Start: 2025-05-05 | End: 2025-05-05

## 2025-05-05 RX ORDER — INSULIN ASPART 100 [IU]/ML
0-5 INJECTION, SOLUTION INTRAVENOUS; SUBCUTANEOUS
Status: DISCONTINUED | OUTPATIENT
Start: 2025-05-05 | End: 2025-05-06

## 2025-05-05 RX ORDER — FAMOTIDINE 20 MG/1
20 TABLET, FILM COATED ORAL 2 TIMES DAILY
Status: DISCONTINUED | OUTPATIENT
Start: 2025-05-05 | End: 2025-05-12 | Stop reason: HOSPADM

## 2025-05-05 RX ORDER — GLUCAGON 1 MG
1 KIT INJECTION
Status: DISCONTINUED | OUTPATIENT
Start: 2025-05-05 | End: 2025-05-12 | Stop reason: HOSPADM

## 2025-05-05 RX ORDER — IBUPROFEN 200 MG
24 TABLET ORAL
Status: DISCONTINUED | OUTPATIENT
Start: 2025-05-05 | End: 2025-05-12 | Stop reason: HOSPADM

## 2025-05-05 RX ORDER — METOPROLOL TARTRATE 25 MG/1
12.5 TABLET, FILM COATED ORAL 2 TIMES DAILY
COMMUNITY

## 2025-05-05 RX ORDER — GABAPENTIN 300 MG/1
300 CAPSULE ORAL 3 TIMES DAILY
COMMUNITY

## 2025-05-05 RX ORDER — HYDRALAZINE HYDROCHLORIDE 25 MG/1
25 TABLET, FILM COATED ORAL 2 TIMES DAILY
Status: DISCONTINUED | OUTPATIENT
Start: 2025-05-05 | End: 2025-05-09

## 2025-05-05 RX ORDER — METFORMIN HYDROCHLORIDE 500 MG/1
1000 TABLET ORAL 2 TIMES DAILY WITH MEALS
Status: DISCONTINUED | OUTPATIENT
Start: 2025-05-05 | End: 2025-05-05

## 2025-05-05 RX ORDER — HYDROCODONE BITARTRATE AND ACETAMINOPHEN 5; 325 MG/1; MG/1
1 TABLET ORAL EVERY 6 HOURS PRN
Status: DISCONTINUED | OUTPATIENT
Start: 2025-05-05 | End: 2025-05-12 | Stop reason: HOSPADM

## 2025-05-05 RX ORDER — ATORVASTATIN CALCIUM 40 MG/1
80 TABLET, FILM COATED ORAL DAILY
Status: DISCONTINUED | OUTPATIENT
Start: 2025-05-06 | End: 2025-05-12 | Stop reason: HOSPADM

## 2025-05-05 RX ORDER — LISINOPRIL 40 MG/1
40 TABLET ORAL DAILY
Status: DISCONTINUED | OUTPATIENT
Start: 2025-05-06 | End: 2025-05-09

## 2025-05-05 RX ORDER — FUROSEMIDE 40 MG/1
40 TABLET ORAL 2 TIMES DAILY
Status: DISCONTINUED | OUTPATIENT
Start: 2025-05-05 | End: 2025-05-08

## 2025-05-05 RX ADMIN — HYDROCODONE BITARTRATE AND ACETAMINOPHEN 1 TABLET: 5; 325 TABLET ORAL at 08:05

## 2025-05-05 RX ADMIN — HYDRALAZINE HYDROCHLORIDE 25 MG: 25 TABLET ORAL at 08:05

## 2025-05-05 RX ADMIN — PANTOPRAZOLE SODIUM 8 MG/HR: 40 INJECTION, POWDER, FOR SOLUTION INTRAVENOUS at 11:05

## 2025-05-05 RX ADMIN — FUROSEMIDE 40 MG: 40 TABLET ORAL at 08:05

## 2025-05-05 RX ADMIN — BUSPIRONE HYDROCHLORIDE 5 MG: 5 TABLET ORAL at 08:05

## 2025-05-05 RX ADMIN — FAMOTIDINE 20 MG: 20 TABLET, FILM COATED ORAL at 08:05

## 2025-05-05 RX ADMIN — TRAZODONE HYDROCHLORIDE 50 MG: 50 TABLET ORAL at 08:05

## 2025-05-05 RX ADMIN — METOPROLOL TARTRATE 12.5 MG: 25 TABLET, FILM COATED ORAL at 08:05

## 2025-05-05 RX ADMIN — PANTOPRAZOLE SODIUM 8 MG/HR: 40 INJECTION, POWDER, FOR SOLUTION INTRAVENOUS at 06:05

## 2025-05-05 RX ADMIN — GABAPENTIN 300 MG: 300 CAPSULE ORAL at 08:05

## 2025-05-05 RX ADMIN — PANTOPRAZOLE SODIUM 80 MG: 40 INJECTION, POWDER, LYOPHILIZED, FOR SOLUTION INTRAVENOUS at 06:05

## 2025-05-05 NOTE — ED PROVIDER NOTES
"Encounter Date: 5/5/2025       History     Chief Complaint   Patient presents with    Abnormal Labs     PT to ER via [facility] from [facility], report states PT has abnormal labs including low H&H.      69-year-old male arrives via EMS from the nursing home with complaints of fatigue.  He was found on a CBC earlier today to be anemic, with a hemoglobin of 6.5.  This is new for him.  He is on Eliquis and Plavix.  He has not seem melena, but "I do not look at my stools".    The history is provided by the patient, the EMS personnel, medical records and a relative.     Review of patient's allergies indicates:  No Known Allergies  Past Medical History:   Diagnosis Date    Anxiety disorder, unspecified     Aphasia     CVA (cerebral vascular accident)     Depression     Diabetes mellitus     GERD (gastroesophageal reflux disease)     Hemiplegia     HTN (hypertension)     Mixed hyperlipidemia     Stroke      Past Surgical History:   Procedure Laterality Date    AMPUTATION, LOWER LIMB Left     BKA    CAROTID ENDARTERECTOMY Left     FRACTURE SURGERY      VASCULAR SURGERY       No family history on file.  Social History[1]  Review of Systems   Constitutional:  Positive for fatigue. Negative for fever.   HENT:  Negative for sore throat.    Respiratory:  Negative for shortness of breath.    Cardiovascular:  Negative for chest pain.   Gastrointestinal:  Negative for nausea.   Genitourinary:  Negative for dysuria.   Musculoskeletal:  Negative for back pain.   Skin:  Negative for rash.   Neurological:  Positive for weakness.   Hematological:  Does not bruise/bleed easily.   All other systems reviewed and are negative.      Physical Exam     Initial Vitals [05/05/25 1746]   BP Pulse Resp Temp SpO2   (!) 102/55 82 15 98.4 °F (36.9 °C) 96 %      MAP       --         Physical Exam    Nursing note and vitals reviewed.  Constitutional: Vital signs are normal. He appears well-developed and well-nourished. He is cooperative.   HENT: "   Head: Normocephalic and atraumatic. Mouth/Throat: Oropharynx is clear and moist.   Eyes: EOM and lids are normal. Pupils are equal, round, and reactive to light.   Pale conjunctiva   Neck: Trachea normal. Neck supple.   Normal range of motion.  Cardiovascular:  Normal rate, regular rhythm, normal heart sounds and intact distal pulses.           Pulmonary/Chest: Breath sounds normal.   Abdominal: Abdomen is soft. Bowel sounds are normal.   Musculoskeletal:         General: Normal range of motion.      Cervical back: Normal, normal range of motion and neck supple.      Lumbar back: Normal.     Neurological: He is alert and oriented to person, place, and time. He has normal strength. Coordination normal. GCS score is 15. GCS eye subscore is 4. GCS verbal subscore is 5. GCS motor subscore is 6.   Skin: Skin is warm, dry and intact. Capillary refill takes less than 2 seconds.   Psychiatric: He has a normal mood and affect. His speech is normal and behavior is normal. Judgment and thought content normal. Cognition and memory are normal.         ED Course   Procedures  Labs Reviewed   HEMOGLOBIN AND HEMATOCRIT, BLOOD - Abnormal       Result Value    Hgb 7.0 (*)     Hct 25.9 (*)    COMPREHENSIVE METABOLIC PANEL - Abnormal    Sodium 140      Potassium 4.4      Chloride 108      CO2 29      Glucose 140 (*)     Blood Urea Nitrogen 34 (*)     Creatinine 0.86      Calcium 8.3 (*)     Protein Total 6.9      Albumin 4.0      Globulin 2.9      Albumin/Globulin Ratio 1.4      Bilirubin Total 0.4      ALP 61      ALT 24      AST 27      eGFR >90      Anion Gap 3.0      BUN/Creatinine Ratio 40 (*)    OCCULT BLOOD, STOOL 1ST SPECIMEN   TYPE & SCREEN    Group & Rh B POS      Indirect Jeffry GEL NEG      Specimen Outdate 05/08/2025 23:59     ABORH RETYPE    ABORH Retype B POS     PREPARE RBC SOFT    UNIT NUMBER [id number]      UNIT ABO/RH B POS      DISPENSE STATUS Selected      Unit Expiration [id number]      Product Code  [id number]      Unit Blood Type Code 7300      CROSSMATCH INTERPRETATION Compatible            Imaging Results    None          Medications   pantoprazole (PROTONIX) 40 mg in 0.9% NaCl 100 mL IVPB (MB+) (8 mg/hr Intravenous New Bag 5/5/25 5924)   0.9%  NaCl infusion (for blood administration) (has no administration in time range)   pantoprazole injection 80 mg (80 mg Intravenous Given 5/5/25 9667)     Medical Decision Making  Acute anemia  Differential diagnosis:  Upper GI bleed, blood dyscrasia, neoplasm, anemia of chronic disease  Protonix, transfusion  Admit, surgical consultation    Amount and/or Complexity of Data Reviewed  Labs: ordered.  Discussion of management or test interpretation with external provider(s): Consult placed, and patient discussed with Dr. Guzman.    Patient and findings discussed with Dr. Ambrosio.  Admission orders and med rec completed.    Risk  Prescription drug management.                                      Clinical Impression:  Final diagnoses:  [D64.9] Acute anemia (Primary)  [D68.9] Coagulopathy          ED Disposition Condition    Observation                     [1]   Social History  Tobacco Use    Smoking status: Former     Types: Cigarettes   Substance Use Topics    Alcohol use: Not Currently    Drug use: Not Currently        Donn Henry MD  05/05/25 192

## 2025-05-06 PROBLEM — D62 ACUTE BLOOD LOSS ANEMIA: Status: ACTIVE | Noted: 2025-05-06

## 2025-05-06 PROBLEM — E66.9 OBESITY: Status: ACTIVE | Noted: 2025-05-06

## 2025-05-06 PROBLEM — K92.2 GI BLEED: Status: ACTIVE | Noted: 2025-05-06

## 2025-05-06 PROBLEM — I10 PRIMARY HYPERTENSION: Status: ACTIVE | Noted: 2025-05-06

## 2025-05-06 PROBLEM — E11.9 CONTROLLED TYPE 2 DIABETES MELLITUS, WITHOUT LONG-TERM CURRENT USE OF INSULIN: Status: ACTIVE | Noted: 2025-05-06

## 2025-05-06 PROBLEM — D69.6 THROMBOCYTOPENIA: Status: ACTIVE | Noted: 2025-05-06

## 2025-05-06 LAB
ANION GAP SERPL CALC-SCNC: 2 MEQ/L (ref 2–13)
BASOPHILS # BLD AUTO: 0.04 X10(3)/MCL (ref 0.01–0.08)
BASOPHILS NFR BLD AUTO: 0.6 % (ref 0.1–1.2)
BUN SERPL-MCNC: 31 MG/DL (ref 7–20)
CALCIUM SERPL-MCNC: 8 MG/DL (ref 8.4–10.2)
CHLORIDE SERPL-SCNC: 106 MMOL/L (ref 98–110)
CO2 SERPL-SCNC: 31 MMOL/L (ref 21–32)
COLOR STL: ABNORMAL
CONSISTENCY STL: ABNORMAL
CREAT SERPL-MCNC: 0.92 MG/DL (ref 0.66–1.25)
CREAT/UREA NIT SERPL: 34 (ref 12–20)
EOSINOPHIL # BLD AUTO: 0.03 X10(3)/MCL (ref 0.04–0.54)
EOSINOPHIL NFR BLD AUTO: 0.4 % (ref 0.7–7)
ERYTHROCYTE [DISTWIDTH] IN BLOOD BY AUTOMATED COUNT: 16.9 %
GFR SERPLBLD CREATININE-BSD FMLA CKD-EPI: >90 ML/MIN/1.73/M2
GLUCOSE SERPL-MCNC: 115 MG/DL (ref 70–115)
HCT VFR BLD AUTO: 26 % (ref 36–52)
HCT VFR BLD AUTO: 26.5 % (ref 36–52)
HCT VFR BLD AUTO: 26.5 % (ref 36–52)
HCT VFR BLD AUTO: 26.6 % (ref 36–52)
HEMOCCULT SP1 STL QL: POSITIVE
HEMOCCULT SP2 STL QL: POSITIVE
HEMOCCULT SP3 STL QL: POSITIVE
HGB BLD-MCNC: 7.4 G/DL (ref 13–18)
HGB BLD-MCNC: 7.4 G/DL (ref 13–18)
HGB BLD-MCNC: 7.5 G/DL (ref 13–18)
HGB BLD-MCNC: 7.5 G/DL (ref 13–18)
IMM GRANULOCYTES # BLD AUTO: 0.26 X10(3)/MCL (ref 0–0.03)
IMM GRANULOCYTES NFR BLD AUTO: 3.8 % (ref 0–0.5)
IRON SATN MFR SERPL: 4 % (ref 20–50)
IRON SERPL-MCNC: 18 UG/DL (ref 65–175)
LYMPHOCYTES # BLD AUTO: 1.18 X10(3)/MCL (ref 1.32–3.57)
LYMPHOCYTES NFR BLD AUTO: 17.3 % (ref 20–55)
MCH RBC QN AUTO: 22.7 PG (ref 27–34)
MCHC RBC AUTO-ENTMCNC: 28.3 G/DL (ref 31–37)
MCV RBC AUTO: 80.1 FL (ref 79–99)
MONOCYTES # BLD AUTO: 0.82 X10(3)/MCL (ref 0.3–0.82)
MONOCYTES NFR BLD AUTO: 12 % (ref 4.7–12.5)
NEUTROPHILS # BLD AUTO: 4.49 X10(3)/MCL (ref 1.78–5.38)
NEUTROPHILS NFR BLD AUTO: 65.9 % (ref 37–73)
NRBC BLD AUTO-RTO: 1 %
PLATELET # BLD AUTO: 124 X10(3)/MCL (ref 140–371)
PMV BLD AUTO: 9.9 FL (ref 9.4–12.4)
POCT GLUCOSE: 120 MG/DL (ref 70–110)
POCT GLUCOSE: 143 MG/DL (ref 70–110)
POCT GLUCOSE: 151 MG/DL (ref 70–110)
POCT GLUCOSE: 187 MG/DL (ref 70–110)
POTASSIUM SERPL-SCNC: 4.2 MMOL/L (ref 3.5–5.1)
RBC # BLD AUTO: 3.31 X10(6)/MCL (ref 4–6)
SODIUM SERPL-SCNC: 139 MMOL/L (ref 136–145)
TIBC SERPL-MCNC: 457 UG/DL (ref 60–240)
TIBC SERPL-MCNC: 475 UG/DL (ref 250–450)
TRANSFERRIN SERPL-MCNC: 438 MG/DL (ref 163–344)
WBC # BLD AUTO: 6.82 X10(3)/MCL (ref 4–11.5)

## 2025-05-06 PROCEDURE — 96366 THER/PROPH/DIAG IV INF ADDON: CPT

## 2025-05-06 PROCEDURE — 36415 COLL VENOUS BLD VENIPUNCTURE: CPT

## 2025-05-06 PROCEDURE — 11000001 HC ACUTE MED/SURG PRIVATE ROOM

## 2025-05-06 PROCEDURE — 63600175 PHARM REV CODE 636 W HCPCS

## 2025-05-06 PROCEDURE — 82272 OCCULT BLD FECES 1-3 TESTS: CPT | Performed by: FAMILY MEDICINE

## 2025-05-06 PROCEDURE — 25000003 PHARM REV CODE 250: Performed by: INTERNAL MEDICINE

## 2025-05-06 PROCEDURE — 99900035 HC TECH TIME PER 15 MIN (STAT)

## 2025-05-06 PROCEDURE — 85025 COMPLETE CBC W/AUTO DIFF WBC: CPT | Performed by: INTERNAL MEDICINE

## 2025-05-06 PROCEDURE — 80048 BASIC METABOLIC PNL TOTAL CA: CPT | Performed by: INTERNAL MEDICINE

## 2025-05-06 PROCEDURE — 25000003 PHARM REV CODE 250: Performed by: FAMILY MEDICINE

## 2025-05-06 PROCEDURE — 25000003 PHARM REV CODE 250

## 2025-05-06 PROCEDURE — 94761 N-INVAS EAR/PLS OXIMETRY MLT: CPT

## 2025-05-06 PROCEDURE — 85018 HEMOGLOBIN: CPT | Mod: 59

## 2025-05-06 PROCEDURE — 25000003 PHARM REV CODE 250: Performed by: SURGERY

## 2025-05-06 PROCEDURE — 82272 OCCULT BLD FECES 1-3 TESTS: CPT

## 2025-05-06 PROCEDURE — 94799 UNLISTED PULMONARY SVC/PX: CPT

## 2025-05-06 PROCEDURE — A9560 TC99M LABELED RBC: HCPCS | Performed by: FAMILY MEDICINE

## 2025-05-06 PROCEDURE — 27000221 HC OXYGEN, UP TO 24 HOURS

## 2025-05-06 PROCEDURE — 36415 COLL VENOUS BLD VENIPUNCTURE: CPT | Performed by: INTERNAL MEDICINE

## 2025-05-06 RX ORDER — POLYETHYLENE GLYCOL 3350 17 G/17G
17 POWDER, FOR SOLUTION ORAL EVERY 8 HOURS
Status: DISCONTINUED | OUTPATIENT
Start: 2025-05-06 | End: 2025-05-06

## 2025-05-06 RX ORDER — INSULIN ASPART 100 [IU]/ML
0-5 INJECTION, SOLUTION INTRAVENOUS; SUBCUTANEOUS
Status: DISCONTINUED | OUTPATIENT
Start: 2025-05-06 | End: 2025-05-12 | Stop reason: HOSPADM

## 2025-05-06 RX ORDER — POLYETHYLENE GLYCOL 3350 17 G/17G
17 POWDER, FOR SOLUTION ORAL
Status: COMPLETED | OUTPATIENT
Start: 2025-05-06 | End: 2025-05-07

## 2025-05-06 RX ORDER — MICONAZOLE NITRATE 2 G/100G
POWDER TOPICAL 2 TIMES DAILY
Status: DISCONTINUED | OUTPATIENT
Start: 2025-05-06 | End: 2025-05-12 | Stop reason: HOSPADM

## 2025-05-06 RX ADMIN — METOPROLOL TARTRATE 12.5 MG: 25 TABLET, FILM COATED ORAL at 09:05

## 2025-05-06 RX ADMIN — FUROSEMIDE 40 MG: 40 TABLET ORAL at 09:05

## 2025-05-06 RX ADMIN — SERTRALINE HYDROCHLORIDE 100 MG: 50 TABLET ORAL at 09:05

## 2025-05-06 RX ADMIN — PANTOPRAZOLE SODIUM 8 MG/HR: 40 INJECTION, POWDER, FOR SOLUTION INTRAVENOUS at 04:05

## 2025-05-06 RX ADMIN — MICONAZOLE NITRATE 2 % TOPICAL POWDER: at 08:05

## 2025-05-06 RX ADMIN — POLYETHYLENE GLYCOL 3350 17 G: 17 POWDER, FOR SOLUTION ORAL at 08:05

## 2025-05-06 RX ADMIN — HYDROCODONE BITARTRATE AND ACETAMINOPHEN 1 TABLET: 5; 325 TABLET ORAL at 05:05

## 2025-05-06 RX ADMIN — METOPROLOL TARTRATE 12.5 MG: 25 TABLET, FILM COATED ORAL at 08:05

## 2025-05-06 RX ADMIN — PANTOPRAZOLE SODIUM 8 MG/HR: 40 INJECTION, POWDER, FOR SOLUTION INTRAVENOUS at 05:05

## 2025-05-06 RX ADMIN — MICONAZOLE NITRATE 2 % TOPICAL POWDER: at 12:05

## 2025-05-06 RX ADMIN — FAMOTIDINE 20 MG: 20 TABLET, FILM COATED ORAL at 08:05

## 2025-05-06 RX ADMIN — PANTOPRAZOLE SODIUM 8 MG/HR: 40 INJECTION, POWDER, FOR SOLUTION INTRAVENOUS at 10:05

## 2025-05-06 RX ADMIN — TECHNETIUM TC 99M-LABELED RED BLOOD CELLS 26.2 MILLICURIE: KIT at 11:05

## 2025-05-06 RX ADMIN — EMPAGLIFLOZIN 10 MG: 10 TABLET, FILM COATED ORAL at 09:05

## 2025-05-06 RX ADMIN — TRAZODONE HYDROCHLORIDE 50 MG: 50 TABLET ORAL at 08:05

## 2025-05-06 RX ADMIN — POLYETHYLENE GLYCOL 3350 17 G: 17 POWDER, FOR SOLUTION ORAL at 09:05

## 2025-05-06 RX ADMIN — BUSPIRONE HYDROCHLORIDE 5 MG: 5 TABLET ORAL at 08:05

## 2025-05-06 RX ADMIN — BUSPIRONE HYDROCHLORIDE 5 MG: 5 TABLET ORAL at 09:05

## 2025-05-06 RX ADMIN — POLYETHYLENE GLYCOL 3350 17 G: 17 POWDER, FOR SOLUTION ORAL at 05:05

## 2025-05-06 RX ADMIN — FOLIC ACID 1 MG: 1 TABLET ORAL at 09:05

## 2025-05-06 RX ADMIN — HYDRALAZINE HYDROCHLORIDE 25 MG: 25 TABLET ORAL at 08:05

## 2025-05-06 RX ADMIN — HYDRALAZINE HYDROCHLORIDE 25 MG: 25 TABLET ORAL at 09:05

## 2025-05-06 RX ADMIN — GABAPENTIN 300 MG: 300 CAPSULE ORAL at 08:05

## 2025-05-06 RX ADMIN — GABAPENTIN 300 MG: 300 CAPSULE ORAL at 03:05

## 2025-05-06 RX ADMIN — FUROSEMIDE 40 MG: 40 TABLET ORAL at 08:05

## 2025-05-06 RX ADMIN — FAMOTIDINE 20 MG: 20 TABLET, FILM COATED ORAL at 09:05

## 2025-05-06 RX ADMIN — AMLODIPINE BESYLATE 5 MG: 5 TABLET ORAL at 09:05

## 2025-05-06 RX ADMIN — ATORVASTATIN CALCIUM 80 MG: 40 TABLET, FILM COATED ORAL at 09:05

## 2025-05-06 RX ADMIN — GABAPENTIN 300 MG: 300 CAPSULE ORAL at 09:05

## 2025-05-06 RX ADMIN — POLYETHYLENE GLYCOL 3350 17 G: 17 POWDER, FOR SOLUTION ORAL at 06:05

## 2025-05-06 RX ADMIN — FENOFIBRATE 145 MG: 145 TABLET ORAL at 09:05

## 2025-05-06 RX ADMIN — LISINOPRIL 40 MG: 40 TABLET ORAL at 09:05

## 2025-05-06 RX ADMIN — HYDROCODONE BITARTRATE AND ACETAMINOPHEN 1 TABLET: 5; 325 TABLET ORAL at 09:05

## 2025-05-06 NOTE — HPI
69-year-old male with hx of NIDDM, PVD Left BKA, HTN, Left [facility] resident of NH on plavix and Eliquis not sure why was sent for abnormal lab and generalize weakness.  He was found on a CBC earlier today to be anemic, with a hemoglobin of 6.5.  This is new for him.  Denies Hematochezia or melena, no endoscopy reported he is accompanied by his daughter at bedside. Pt is Poor historian. Surgery consulted for scope, Pt is getting one unit of PRBC and protonix per ED

## 2025-05-06 NOTE — SUBJECTIVE & OBJECTIVE
Past Medical History:   Diagnosis Date    Anxiety disorder, unspecified     Aphasia     CVA (cerebral vascular accident)     Depression     Diabetes mellitus     GERD (gastroesophageal reflux disease)     Hemiplegia     HTN (hypertension)     Mixed hyperlipidemia     Stroke        Past Surgical History:   Procedure Laterality Date    AMPUTATION, LOWER LIMB Left     BKA    CAROTID ENDARTERECTOMY Left     FRACTURE SURGERY      VASCULAR SURGERY         Review of patient's allergies indicates:  No Known Allergies    No current facility-administered medications on file prior to encounter.     Current Outpatient Medications on File Prior to Encounter   Medication Sig    amLODIPine (NORVASC) 2.5 MG tablet Take 10 mg by mouth once daily.    apixaban (ELIQUIS) 5 mg Tab Take 5 mg by mouth 2 (two) times daily.    clopidogreL (PLAVIX) 75 mg tablet Take 75 mg by mouth once daily.    empagliflozin (JARDIANCE) 10 mg tablet Take 10 mg by mouth once daily.    ergocalciferol (VITAMIN D2) 50,000 unit Cap Take 50,000 Units by mouth every Tues, Fri.    famotidine (PEPCID) 20 MG tablet Take 20 mg by mouth 2 (two) times daily.    fenofibrate 160 MG Tab Take 160 mg by mouth nightly.    folic acid (FOLVITE) 1 MG tablet Take 1 mg by mouth once daily.    furosemide (LASIX) 40 MG tablet Take 40 mg by mouth 2 (two) times daily.    gabapentin (NEURONTIN) 300 MG capsule Take 300 mg by mouth 3 (three) times daily.    hydrALAZINE (APRESOLINE) 25 MG tablet Take 25 mg by mouth 2 (two) times daily.    HYDROcodone-acetaminophen (NORCO) 5-325 mg per tablet Take 1 tablet by mouth every 6 (six) hours as needed for Pain.    lisinopriL (PRINIVIL,ZESTRIL) 40 MG tablet Take 40 mg by mouth once daily.    metFORMIN (GLUCOPHAGE) 1000 MG tablet Take 1,000 mg by mouth 2 (two) times daily with meals.    metoprolol tartrate (LOPRESSOR) 25 MG tablet Take 12.5 mg by mouth 2 (two) times daily.    rosuvastatin (CRESTOR) 20 MG tablet Take 20 mg by mouth every evening.     sertraline (ZOLOFT) 100 MG tablet Take 100 mg by mouth once daily.    traZODone (DESYREL) 50 MG tablet Take 50 mg by mouth every evening.    busPIRone (BUSPAR) 5 MG Tab Take 5 mg by mouth 2 (two) times daily.     Family History    None       Tobacco Use    Smoking status: Former     Types: Cigarettes    Smokeless tobacco: Not on file   Substance and Sexual Activity    Alcohol use: Not Currently    Drug use: Not Currently    Sexual activity: Not on file     Review of Systems   Constitutional:  Positive for fatigue.   HENT: Negative.     Eyes: Negative.    Respiratory: Negative.     Cardiovascular: Negative.    Gastrointestinal: Negative.    Endocrine: Negative.    Genitourinary: Negative.    Musculoskeletal: Negative.    Skin: Negative.    Allergic/Immunologic: Negative.    Neurological: Negative.    Hematological: Negative.    Psychiatric/Behavioral: Negative.       Objective:     Vital Signs (Most Recent):  Temp: 98.2 °F (36.8 °C) (05/05/25 1925)  Pulse: 78 (05/05/25 1925)  Resp: 18 (05/05/25 1925)  BP: (!) 140/58 (05/05/25 1925)  SpO2: 95 % (05/05/25 1925) Vital Signs (24h Range):  Temp:  [98.2 °F (36.8 °C)-98.4 °F (36.9 °C)] 98.2 °F (36.8 °C)  Pulse:  [75-82] 78  Resp:  [15-23] 18  SpO2:  [95 %-96 %] 95 %  BP: (102-149)/(50-58) 140/58     Weight: 124.7 kg (275 lb)  Body mass index is 37.3 kg/m².     Physical Exam  Constitutional:       Appearance: Normal appearance. He is obese.   HENT:      Mouth/Throat:      Mouth: Mucous membranes are moist.   Eyes:      Extraocular Movements: Extraocular movements intact.      Conjunctiva/sclera: Conjunctivae normal.      Pupils: Pupils are equal, round, and reactive to light.   Cardiovascular:      Rate and Rhythm: Normal rate and regular rhythm.      Pulses: Normal pulses.      Heart sounds: Normal heart sounds.   Pulmonary:      Effort: Pulmonary effort is normal.      Breath sounds: Normal breath sounds.   Abdominal:      General: Abdomen is flat. Bowel sounds are normal.  "     Palpations: Abdomen is soft.   Musculoskeletal:      Cervical back: Normal range of motion and neck supple.      Comments: Left BKA with dressing on left stump    Neurological:      General: No focal deficit present.      Mental Status: He is alert and oriented to person, place, and time.   Psychiatric:         Mood and Affect: Mood normal.              CRANIAL NERVES     CN III, IV, VI   Pupils are equal, round, and reactive to light.       Significant Labs: All pertinent labs within the past 24 hours have been reviewed.  A1C:   Recent Labs   Lab 11/14/24  1115   HGBA1C 7.7*     ABGs: No results for input(s): "PH", "PCO2", "HCO3", "POCSATURATED", "BE", "TOTALHB", "COHB", "METHB", "O2HB", "POCFIO2", "PO2" in the last 48 hours.  Bilirubin:   Recent Labs   Lab 05/05/25  1809   BILITOT 0.4     Blood Culture: No results for input(s): "LABBLOO" in the last 48 hours.  BMP:   Recent Labs   Lab 05/05/25  1809   *      K 4.4      CO2 29   BUN 34*   CREATININE 0.86   CALCIUM 8.3*     CBC:   Recent Labs   Lab 05/05/25  1615 05/05/25  1809   WBC 8.47  --    HGB 6.5* 7.0*   HCT 25.1* 25.9*     --      CMP:   Recent Labs   Lab 05/05/25  1809      K 4.4      CO2 29   *   BUN 34*   CREATININE 0.86   CALCIUM 8.3*   PROT 6.9   ALBUMIN 4.0   BILITOT 0.4   ALKPHOS 61   AST 27   ALT 24     Cardiac Markers: No results for input(s): "CKMB", "MYOGLOBIN", "BNP", "TROPISTAT" in the last 48 hours.  Coagulation: No results for input(s): "PT", "INR", "APTT" in the last 48 hours.  Lactic Acid: No results for input(s): "LACTATE" in the last 48 hours.  Lipase: No results for input(s): "LIPASE" in the last 48 hours.  Lipid Panel: No results for input(s): "CHOL", "HDL", "LDLCALC", "TRIG", "CHOLHDL" in the last 48 hours.  Magnesium: No results for input(s): "MG" in the last 48 hours.  Pathology Results  (Last 10 years)                 03/09/23 1320  Specimen to Pathology Final result    Narrative:  " "Patient - KEEGAN BOOTH                -  Sex- M   [facility] Rec # - [id number]                        Castillo Rosen   The following is an electronic copy of report # [id number] from:    [facility]   [address] [place] [address] [place] [address]                          Phone [phone]   DIAGNOSIS:   2023      RBW:liseth   BLOOD VESSEL, LEFT CAROTID, ENDARTERECTOMY:   - PARTIALLY CALCIFIED ATHEROMATOUS PLAQUE.     _______________________________________________________________________   SPECIMEN AND SOURCE:   LEFT CAROTID PLAQUE   CLINICAL INFORMATION:   OCCLUSION AND STENOSIS OF LEFT CAROTID ARTERY   GROSS EXAMINATION:   2023  [facility]   Received in formalin, labeled with the patient's name, "Keegan Booth," medical record number, "left carotid plaque," is a firm portion    of tubular white-tan tissue, 5.4 cm in length and 0.6 to 1 cm in    diameter.  The lining is smooth, white-tan with focal firm white areas    and pale yellow calcifications within the wall.  Representative sections    submitted in 1A.     MICROSCOPIC DESCRIPTION:   Unless gross only is specified, the final diagnosis for each specimen is    based on a microscopic examination of representative sections of tissue    by digitally imaged slide(s), to include immunohistochemical and special    stains if performed.    CODE: 0   Pathologist: DEMAR Elizondo MD, FCAP (Electronic Signature)    2023 4:44 PM    [facility], [facility] * [address] *    [place] [id number]    Technical services performed at: [facility], [facility] * [address] [place] [address] * [place] [address]    Gross examination performed at: [facility] * [address] [place] [address] * [place] [address]              POCT Glucose: No results for input(s): "POCTGLUCOSE" in the last 48 hours.  Prealbumin: No results for input(s): "PREALBUMIN" in the last 48 " "hours.  Respiratory Culture: No results for input(s): "GSRESP", "RESPIRATORYC" in the last 48 hours.  Troponin: No results for input(s): "TROPONINI", "TROPONINIHS" in the last 48 hours.  TSH: No results for input(s): "TSH" in the last 4320 hours.  Urine Culture: No results for input(s): "LABURIN" in the last 48 hours.  Urine Studies: No results for input(s): "COLORU", "APPEARANCEUA", "PHUR", "SPECGRAV", "PROTEINUA", "GLUCUA", "KETONESU", "BILIRUBINUA", "OCCULTUA", "NITRITE", "UROBILINOGEN", "LEUKOCYTESUR", "RBCUA", "WBCUA", "BACTERIA", "SQUAMEPITHEL", "HYALINECASTS" in the last 48 hours.    Invalid input(s): "WRIGHTSUR"    Significant Imaging:   "

## 2025-05-06 NOTE — SUBJECTIVE & OBJECTIVE
Past Medical History:   Diagnosis Date    Anticoagulant long-term use     Anxiety disorder, unspecified     Aphasia     CVA (cerebral vascular accident)     Depression     Diabetes mellitus     GERD (gastroesophageal reflux disease)     Hemiplegia     HTN (hypertension)     Mixed hyperlipidemia     Stroke     Unspecified atrial flutter        Past Surgical History:   Procedure Laterality Date    AMPUTATION, LOWER LIMB Left     BKA    CAROTID ENDARTERECTOMY Left     FRACTURE SURGERY      VASCULAR SURGERY         Review of patient's allergies indicates:  No Known Allergies    No current facility-administered medications on file prior to encounter.     Current Outpatient Medications on File Prior to Encounter   Medication Sig    amLODIPine (NORVASC) 2.5 MG tablet Take 10 mg by mouth once daily.    apixaban (ELIQUIS) 5 mg Tab Take 5 mg by mouth 2 (two) times daily.    clopidogreL (PLAVIX) 75 mg tablet Take 75 mg by mouth once daily.    empagliflozin (JARDIANCE) 10 mg tablet Take 10 mg by mouth once daily.    ergocalciferol (VITAMIN D2) 50,000 unit Cap Take 50,000 Units by mouth every Tues, Fri.    famotidine (PEPCID) 20 MG tablet Take 20 mg by mouth 2 (two) times daily.    fenofibrate 160 MG Tab Take 160 mg by mouth nightly.    folic acid (FOLVITE) 1 MG tablet Take 1 mg by mouth once daily.    furosemide (LASIX) 40 MG tablet Take 40 mg by mouth once daily.    gabapentin (NEURONTIN) 300 MG capsule Take 300 mg by mouth 3 (three) times daily.    hydrALAZINE (APRESOLINE) 25 MG tablet Take 25 mg by mouth 2 (two) times daily.    HYDROcodone-acetaminophen (NORCO) 5-325 mg per tablet Take 1 tablet by mouth every 6 (six) hours as needed for Pain.    lisinopriL (PRINIVIL,ZESTRIL) 40 MG tablet Take 40 mg by mouth once daily.    metFORMIN (GLUCOPHAGE) 1000 MG tablet Take 1,000 mg by mouth 2 (two) times daily with meals.    metoprolol tartrate (LOPRESSOR) 25 MG tablet Take 12.5 mg by mouth 2 (two) times daily.    rosuvastatin  (CRESTOR) 20 MG tablet Take 20 mg by mouth every evening.    sertraline (ZOLOFT) 100 MG tablet Take 100 mg by mouth once daily.    traZODone (DESYREL) 50 MG tablet Take 50 mg by mouth every evening.    busPIRone (BUSPAR) 5 MG Tab Take 5 mg by mouth 2 (two) times daily.     Family History    None       Tobacco Use    Smoking status: Former     Types: Cigarettes    Smokeless tobacco: Not on file   Substance and Sexual Activity    Alcohol use: Not Currently    Drug use: Not Currently    Sexual activity: Not on file     Review of Systems   Constitutional:  Positive for fatigue.   HENT: Negative.     Eyes: Negative.    Respiratory: Negative.     Cardiovascular: Negative.    Gastrointestinal: Negative.    Endocrine: Negative.    Genitourinary: Negative.    Musculoskeletal: Negative.    Skin: Negative.    Allergic/Immunologic: Negative.    Neurological: Negative.    Hematological: Negative.    Psychiatric/Behavioral: Negative.       Objective:     Vital Signs (Most Recent):  Temp: 98.5 °F (36.9 °C) (05/06/25 1453)  Pulse: 69 (05/06/25 1453)  Resp: 18 (05/06/25 1453)  BP: (!) 83/47 (05/06/25 1453)  SpO2: (!) 94 % (05/06/25 1453) Vital Signs (24h Range):  Temp:  [97.6 °F (36.4 °C)-98.6 °F (37 °C)] 98.5 °F (36.9 °C)  Pulse:  [69-89] 69  Resp:  [15-23] 18  SpO2:  [92 %-98 %] 94 %  BP: ()/(47-73) 83/47     Weight: 124.6 kg (274 lb 12.8 oz)  Body mass index is 37.27 kg/m².     Physical Exam  Constitutional:       Appearance: Normal appearance. He is obese.   HENT:      Mouth/Throat:      Mouth: Mucous membranes are moist.   Eyes:      Extraocular Movements: Extraocular movements intact.      Conjunctiva/sclera: Conjunctivae normal.      Pupils: Pupils are equal, round, and reactive to light.   Cardiovascular:      Rate and Rhythm: Normal rate and regular rhythm.      Pulses: Normal pulses.      Heart sounds: Normal heart sounds.   Pulmonary:      Effort: Pulmonary effort is normal.      Breath sounds: Normal breath sounds.  "  Abdominal:      General: Abdomen is flat. Bowel sounds are normal.      Palpations: Abdomen is soft.   Musculoskeletal:      Cervical back: Normal range of motion and neck supple.      Comments: Left BKA with dressing on left stump    Neurological:      General: No focal deficit present.      Mental Status: He is alert and oriented to person, place, and time.   Psychiatric:         Mood and Affect: Mood normal.              CRANIAL NERVES     CN III, IV, VI   Pupils are equal, round, and reactive to light.       Significant Labs: All pertinent labs within the past 24 hours have been reviewed.  A1C:   Recent Labs   Lab 11/14/24  1115   HGBA1C 7.7*     ABGs: No results for input(s): "PH", "PCO2", "HCO3", "POCSATURATED", "BE", "TOTALHB", "COHB", "METHB", "O2HB", "POCFIO2", "PO2" in the last 48 hours.  Bilirubin:   Recent Labs   Lab 05/05/25 1809   BILITOT 0.4     Blood Culture: No results for input(s): "LABBLOO" in the last 48 hours.  BMP:   Recent Labs   Lab 05/06/25  0450         K 4.2      CO2 31   BUN 31*   CREATININE 0.92   CALCIUM 8.0*     CBC:   Recent Labs   Lab 05/05/25  1615 05/05/25  1809 05/06/25  0208 05/06/25  0450 05/06/25  0805   WBC 8.47  --   --  6.82  --    HGB 6.5*   < > 7.4* 7.5* 7.5*   HCT 25.1*   < > 26.5* 26.5* 26.6*     --   --  124*  --     < > = values in this interval not displayed.     CMP:   Recent Labs   Lab 05/05/25  1809 05/06/25  0450    139   K 4.4 4.2    106   CO2 29 31   * 115   BUN 34* 31*   CREATININE 0.86 0.92   CALCIUM 8.3* 8.0*   PROT 6.9  --    ALBUMIN 4.0  --    BILITOT 0.4  --    ALKPHOS 61  --    AST 27  --    ALT 24  --      Cardiac Markers: No results for input(s): "CKMB", "MYOGLOBIN", "BNP", "TROPISTAT" in the last 48 hours.  Coagulation: No results for input(s): "PT", "INR", "APTT" in the last 48 hours.  Lactic Acid: No results for input(s): "LACTATE" in the last 48 hours.  Lipase: No results for input(s): "LIPASE" in the " "last 48 hours.  Lipid Panel: No results for input(s): "CHOL", "HDL", "LDLCALC", "TRIG", "CHOLHDL" in the last 48 hours.  Magnesium: No results for input(s): "MG" in the last 48 hours.  Pathology Results  (Last 10 years)                 23 1320  Specimen to Pathology Final result    Narrative:  Patient - KEEGAN BOOTH                -  Sex- M   Med Rec # - [id number]                        Castillo Rosen   The following is an electronic copy of report # [id number] from:    [facility]   [address] [place] [address]                          Phone [phone]   DIAGNOSIS:   2023      RBW:liseth   BLOOD VESSEL, LEFT CAROTID, ENDARTERECTOMY:   - PARTIALLY CALCIFIED ATHEROMATOUS PLAQUE.     _______________________________________________________________________   SPECIMEN AND SOURCE:   LEFT CAROTID PLAQUE   CLINICAL INFORMATION:   OCCLUSION AND STENOSIS OF LEFT CAROTID ARTERY   GROSS EXAMINATION:   2023  [facility]   Received in formalin, labeled with the patient's name, "Keegan Booth," medical record number, "left carotid plaque," is a firm portion    of tubular white-tan tissue, 5.4 cm in length and 0.6 to 1 cm in    diameter.  The lining is smooth, white-tan with focal firm white areas    and pale yellow calcifications within the wall.  Representative sections    submitted in 1A.     MICROSCOPIC DESCRIPTION:   Unless gross only is specified, the final diagnosis for each specimen is    based on a microscopic examination of representative sections of tissue    by digitally imaged slide(s), to include immunohistochemical and special    stains if performed.    CODE: 0   Pathologist: DEMAR Elizondo MD, FCAP (Electronic Signature)    2023 4:44 PM    [facility] * [address] *    [place] [id number]    Technical services performed at: [facility] * [address] [place] [address] " "* [place] [address]    Gross examination performed at: [facility], [facility] * [address]    [facility] * [place] [address]              POCT Glucose:   Recent Labs   Lab 05/06/25  0755   POCTGLUCOSE 120*     Prealbumin: No results for input(s): "PREALBUMIN" in the last 48 hours.  Respiratory Culture: No results for input(s): "GSRESP", "RESPIRATORYC" in the last 48 hours.  Troponin: No results for input(s): "TROPONINI", "TROPONINIHS" in the last 48 hours.  TSH: No results for input(s): "TSH" in the last 4320 hours.  Urine Culture: No results for input(s): "LABURIN" in the last 48 hours.  Urine Studies: No results for input(s): "COLORU", "APPEARANCEUA", "PHUR", "SPECGRAV", "PROTEINUA", "GLUCUA", "KETONESU", "BILIRUBINUA", "OCCULTUA", "NITRITE", "UROBILINOGEN", "LEUKOCYTESUR", "RBCUA", "WBCUA", "BACTERIA", "SQUAMEPITHEL", "HYALINECASTS" in the last 48 hours.    Invalid input(s): "WRIGHTSUR"    Significant Imaging:   "

## 2025-05-06 NOTE — ASSESSMENT & PLAN NOTE
Patient's FSGs are controlled on current medication regimen.  Last A1c reviewed-   Lab Results   Component Value Date    HGBA1C 7.7 (H) 11/14/2024     Most recent fingerstick glucose reviewed-   Recent Labs   Lab 05/06/25  0755   POCTGLUCOSE 120*     Current correctional scale  Low  Maintain anti-hyperglycemic dose as follows-   Antihyperglycemics (From admission, onward)      Start     Stop Route Frequency Ordered    05/06/25 0900  empagliflozin (Jardiance) tablet 10 mg        Question Answer Comment   Does this patient have a diagnosis of heart failure? Yes    Does this patient have type 1 diabetes or diabetic ketoacidosis? No    Does this patient have symptomatic hypotension? No    Is the patient NPO or pending major surgery in next 3 days or less? No        -- Oral Daily 05/05/25 1937    05/06/25 0746  insulin aspart U-100 pen 0-5 Units         -- SubQ Before meals & nightly PRN 05/06/25 0647          Hold Oral hypoglycemics while patient is in the hospital.

## 2025-05-06 NOTE — ASSESSMENT & PLAN NOTE
Anemia is likely due to acute blood loss which was from GI and Iron deficiency. Most recent hemoglobin and hematocrit are listed below.  Recent Labs     05/06/25  0208 05/06/25  0450 05/06/25  0805   HGB 7.4* 7.5* 7.5*   HCT 26.5* 26.5* 26.6*     Plan  - Monitor serial CBC: Every 6 hours  - Transfuse PRBC if patient becomes hemodynamically unstable, symptomatic or H/H drops below 7/21.  - Patient has received 1 units of PRBCs on 05/05/2025  - Patient's anemia is currently stable  -

## 2025-05-06 NOTE — ASSESSMENT & PLAN NOTE
The likely etiology of thrombocytopenia is unknown. The patients 3 most recent labs are listed below.  Recent Labs     05/05/25  1615 05/06/25  0450    124*     Plan  - Will transfuse if platelet count is <50k (if undergoing surgical procedure or have active bleeding).  -

## 2025-05-06 NOTE — PLAN OF CARE
05/06/25 0821   Discharge Assessment   Assessment Type Discharge Planning Assessment   Confirmed/corrected address, phone number and insurance Yes   Confirmed Demographics Correct on Facesheet   Source of Information facility verbal report   When was your last doctors appointment?   (at NH)   Reason For Admission anemia   People in Home facility resident   Facility Arrived From: [facility]   Do you expect to return to your current living situation? Yes   Do you have help at home or someone to help you manage your care at home? Yes   Who are your caregiver(s) and their phone number(s)? nh staff   Prior to hospitilization cognitive status: No Deficits   Current cognitive status: No Deficits   Walking or Climbing Stairs Difficulty yes   Walking or Climbing Stairs transferring difficulty, dependent   Mobility Management w/c   Dressing/Bathing Difficulty yes   Dressing/Bathing dressing difficulty, dependent   Dressing/Bathing Management nh staff   Equipment Currently Used at Home wheelchair   Readmission within 30 days? No   Patient currently being followed by outpatient case management? No   Do you currently have service(s) that help you manage your care at home? No   Do you take prescription medications? Yes   Do you have prescription coverage? Yes   Coverage medicare/medicaid   Do you have any problems affording any of your prescribed medications? No   Is the patient taking medications as prescribed? yes   Who is going to help you get home at discharge? nh staff   How do you get to doctors appointments? other (see comments)  (md visits at nh)   Are you on dialysis? No   Discharge Plan A Return to nursing home   Discharge Plan B Return to Nursing Home   DME Needed Upon Discharge  none   Discharge Plan discussed with: Patient   Transition of Care Barriers Mobility   Physical Activity   On average, how many days per week do you engage in moderate to strenuous exercise (like a brisk walk)? 0 days   Financial Resource  Strain   How hard is it for you to pay for the very basics like food, housing, medical care, and heating? Not very   Housing Stability   In the last 12 months, was there a time when you were not able to pay the mortgage or rent on time? N   In the past 12 months, how many times have you moved where you were living? 1   At any time in the past 12 months, were you homeless or living in a shelter (including now)? N   Transportation Needs   In the past 12 months, has lack of transportation kept you from medical appointments or from getting medications? no   In the past 12 months, has lack of transportation kept you from meetings, work, or from getting things needed for daily living? No   Food Insecurity   Within the past 12 months, you worried that your food would run out before you got the money to buy more. Never true   Within the past 12 months, the food you bought just didn't last and you didn't have money to get more. Never true   Stress   Do you feel stress - tense, restless, nervous, or anxious, or unable to sleep at night because your mind is troubled all the time - these days? Not at all   Social Isolation   How often do you feel lonely or isolated from those around you?  Never   Alcohol Use   Q1: How often do you have a drink containing alcohol? Never   Q2: How many drinks containing alcohol do you have on a typical day when you are drinking? None   Utilities   In the past 12 months has the electric, gas, oil, or water company threatened to shut off services in your home? No   Health Literacy   How often do you need to have someone help you when you read instructions, pamphlets, or other written material from your doctor or pharmacy? Never

## 2025-05-06 NOTE — PLAN OF CARE
Problem: Adult Inpatient Plan of Care  Goal: Plan of Care Review  Outcome: Progressing  Goal: Patient-Specific Goal (Individualized)  Outcome: Progressing  Goal: Absence of Hospital-Acquired Illness or Injury  Outcome: Progressing  Goal: Optimal Comfort and Wellbeing  Outcome: Progressing  Goal: Readiness for Transition of Care  Outcome: Progressing     Problem: Skin Injury Risk Increased  Goal: Skin Health and Integrity  Outcome: Progressing     Problem: Anemia  Goal: Anemia Symptom Improvement  Outcome: Progressing

## 2025-05-06 NOTE — PROGRESS NOTES
[facility]/Surg  [facility] Medicine  Progress Note    Patient Name: Onesimo Booth  MRN: [id number]  Patient Class: OP- Observation   Admission Date: 5/5/2025  Length of Stay: 0 days  Attending Physician: Lacho Castro MD  Primary Care Provider: Denys Mckeon III, MD        Subjective     Principal Problem:Acute blood loss anemia        HPI:  69-year-old male with hx of NIDDM, PVD Left BKA, HTN, Left [facility] resident of NH on plavix and Eliquis not sure why was sent for abnormal lab and generalize weakness.  He was found on a CBC earlier today to be anemic, with a hemoglobin of 6.5.  This is new for him.  Denies Hematochezia or melena, no endoscopy reported he is accompanied by his daughter at bedside. Pt is Poor historian. Surgery consulted for scope, Pt is getting one unit of PRBC and protonix per ED    Overview/Hospital Course:   05/06/2025 pt admitted with GI bleed.  S/p 1 U PRBCS and hgb at 7.5 stable last several draws.      Past Medical History:   Diagnosis Date    Anticoagulant long-term use     Anxiety disorder, unspecified     Aphasia     CVA (cerebral vascular accident)     Depression     Diabetes mellitus     GERD (gastroesophageal reflux disease)     Hemiplegia     HTN (hypertension)     Mixed hyperlipidemia     Stroke     Unspecified atrial flutter        Past Surgical History:   Procedure Laterality Date    AMPUTATION, LOWER LIMB Left     BKA    CAROTID ENDARTERECTOMY Left     FRACTURE SURGERY      VASCULAR SURGERY         Review of patient's allergies indicates:  No Known Allergies    No current facility-administered medications on file prior to encounter.     Current Outpatient Medications on File Prior to Encounter   Medication Sig    amLODIPine (NORVASC) 2.5 MG tablet Take 10 mg by mouth once daily.    apixaban (ELIQUIS) 5 mg Tab Take 5 mg by mouth 2 (two) times daily.    clopidogreL (PLAVIX) 75 mg tablet Take 75 mg by mouth once daily.    empagliflozin (JARDIANCE) 10 mg  tablet Take 10 mg by mouth once daily.    ergocalciferol (VITAMIN D2) 50,000 unit Cap Take 50,000 Units by mouth every Tues, Fri.    famotidine (PEPCID) 20 MG tablet Take 20 mg by mouth 2 (two) times daily.    fenofibrate 160 MG Tab Take 160 mg by mouth nightly.    folic acid (FOLVITE) 1 MG tablet Take 1 mg by mouth once daily.    furosemide (LASIX) 40 MG tablet Take 40 mg by mouth once daily.    gabapentin (NEURONTIN) 300 MG capsule Take 300 mg by mouth 3 (three) times daily.    hydrALAZINE (APRESOLINE) 25 MG tablet Take 25 mg by mouth 2 (two) times daily.    HYDROcodone-acetaminophen (NORCO) 5-325 mg per tablet Take 1 tablet by mouth every 6 (six) hours as needed for Pain.    lisinopriL (PRINIVIL,ZESTRIL) 40 MG tablet Take 40 mg by mouth once daily.    metFORMIN (GLUCOPHAGE) 1000 MG tablet Take 1,000 mg by mouth 2 (two) times daily with meals.    metoprolol tartrate (LOPRESSOR) 25 MG tablet Take 12.5 mg by mouth 2 (two) times daily.    rosuvastatin (CRESTOR) 20 MG tablet Take 20 mg by mouth every evening.    sertraline (ZOLOFT) 100 MG tablet Take 100 mg by mouth once daily.    traZODone (DESYREL) 50 MG tablet Take 50 mg by mouth every evening.    busPIRone (BUSPAR) 5 MG Tab Take 5 mg by mouth 2 (two) times daily.     Family History    None       Tobacco Use    Smoking status: Former     Types: Cigarettes    Smokeless tobacco: Not on file   Substance and Sexual Activity    Alcohol use: Not Currently    Drug use: Not Currently    Sexual activity: Not on file     Review of Systems   Constitutional:  Positive for fatigue.   HENT: Negative.     Eyes: Negative.    Respiratory: Negative.     Cardiovascular: Negative.    Gastrointestinal: Negative.    Endocrine: Negative.    Genitourinary: Negative.    Musculoskeletal: Negative.    Skin: Negative.    Allergic/Immunologic: Negative.    Neurological: Negative.    Hematological: Negative.    Psychiatric/Behavioral: Negative.       Objective:     Vital Signs (Most  "Recent):  Temp: 98.5 °F (36.9 °C) (05/06/25 1453)  Pulse: 69 (05/06/25 1453)  Resp: 18 (05/06/25 1453)  BP: (!) 83/47 (05/06/25 1453)  SpO2: (!) 94 % (05/06/25 1453) Vital Signs (24h Range):  Temp:  [97.6 °F (36.4 °C)-98.6 °F (37 °C)] 98.5 °F (36.9 °C)  Pulse:  [69-89] 69  Resp:  [15-23] 18  SpO2:  [92 %-98 %] 94 %  BP: ()/(47-73) 83/47     Weight: 124.6 kg (274 lb 12.8 oz)  Body mass index is 37.27 kg/m².     Physical Exam  Constitutional:       Appearance: Normal appearance. He is obese.   HENT:      Mouth/Throat:      Mouth: Mucous membranes are moist.   Eyes:      Extraocular Movements: Extraocular movements intact.      Conjunctiva/sclera: Conjunctivae normal.      Pupils: Pupils are equal, round, and reactive to light.   Cardiovascular:      Rate and Rhythm: Normal rate and regular rhythm.      Pulses: Normal pulses.      Heart sounds: Normal heart sounds.   Pulmonary:      Effort: Pulmonary effort is normal.      Breath sounds: Normal breath sounds.   Abdominal:      General: Abdomen is flat. Bowel sounds are normal.      Palpations: Abdomen is soft.   Musculoskeletal:      Cervical back: Normal range of motion and neck supple.      Comments: Left BKA with dressing on left stump    Neurological:      General: No focal deficit present.      Mental Status: He is alert and oriented to person, place, and time.   Psychiatric:         Mood and Affect: Mood normal.              CRANIAL NERVES     CN III, IV, VI   Pupils are equal, round, and reactive to light.       Significant Labs: All pertinent labs within the past 24 hours have been reviewed.  A1C:   Recent Labs   Lab 11/14/24  1115   HGBA1C 7.7*     ABGs: No results for input(s): "PH", "PCO2", "HCO3", "POCSATURATED", "BE", "TOTALHB", "COHB", "METHB", "O2HB", "POCFIO2", "PO2" in the last 48 hours.  Bilirubin:   Recent Labs   Lab 05/05/25  1809   BILITOT 0.4     Blood Culture: No results for input(s): "LABBLOO" in the last 48 hours.  BMP:   Recent Labs " "  Lab 25  0450         K 4.2      CO2 31   BUN 31*   CREATININE 0.92   CALCIUM 8.0*     CBC:   Recent Labs   Lab 25  1615 25  1809 25  0208 25  0450 25  0805   WBC 8.47  --   --  6.82  --    HGB 6.5*   < > 7.4* 7.5* 7.5*   HCT 25.1*   < > 26.5* 26.5* 26.6*     --   --  124*  --     < > = values in this interval not displayed.     CMP:   Recent Labs   Lab 25  18025  0450    139   K 4.4 4.2    106   CO2 29 31   * 115   BUN 34* 31*   CREATININE 0.86 0.92   CALCIUM 8.3* 8.0*   PROT 6.9  --    ALBUMIN 4.0  --    BILITOT 0.4  --    ALKPHOS 61  --    AST 27  --    ALT 24  --      Cardiac Markers: No results for input(s): "CKMB", "MYOGLOBIN", "BNP", "TROPISTAT" in the last 48 hours.  Coagulation: No results for input(s): "PT", "INR", "APTT" in the last 48 hours.  Lactic Acid: No results for input(s): "LACTATE" in the last 48 hours.  Lipase: No results for input(s): "LIPASE" in the last 48 hours.  Lipid Panel: No results for input(s): "CHOL", "HDL", "LDLCALC", "TRIG", "CHOLHDL" in the last 48 hours.  Magnesium: No results for input(s): "MG" in the last 48 hours.  Pathology Results  (Last 10 years)                 23 1320  Specimen to Pathology Final result    Narrative:  Patient - KEEGAN SMITH                -  Sex- M   Med Rec # - [id number]                        [place] Rosen   The following is an electronic copy of report # [id number] from:    [facility]   [address] [place] [id number]                          Phone [phone]   DIAGNOSIS:   2023      RBW:liseth   BLOOD VESSEL, LEFT CAROTID, ENDARTERECTOMY:   - PARTIALLY CALCIFIED ATHEROMATOUS PLAQUE.     _______________________________________________________________________   SPECIMEN AND SOURCE:   LEFT CAROTID PLAQUE   CLINICAL INFORMATION:   OCCLUSION AND STENOSIS OF LEFT CAROTID ARTERY " "  GROSS EXAMINATION:   03/09/2023  WO/WPL   Received in formalin, labeled with the patient's name, "Onesimo Booth," medical record number, "left carotid plaque," is a firm portion    of tubular white-tan tissue, 5.4 cm in length and 0.6 to 1 cm in    diameter.  The lining is smooth, white-tan with focal firm white areas    and pale yellow calcifications within the wall.  Representative sections    submitted in 1A.     MICROSCOPIC DESCRIPTION:   Unless gross only is specified, the final diagnosis for each specimen is    based on a microscopic examination of representative sections of tissue    by digitally imaged slide(s), to include immunohistochemical and special    stains if performed.    CODE: 0   Pathologist: DEMAR Elizondo MD, FCAP (Electronic Signature)    03/13/2023 4:44 PM    [facility] * [address] *    [place] [phone]    Technical services performed at: [facility] * [address] [place] [address] * [place] [address]    Gross examination performed at: [facility] * [address] [place] [address] * [place] [address]              POCT Glucose:   Recent Labs   Lab 05/06/25  0755   POCTGLUCOSE 120*     Prealbumin: No results for input(s): "PREALBUMIN" in the last 48 hours.  Respiratory Culture: No results for input(s): "GSRESP", "RESPIRATORYC" in the last 48 hours.  Troponin: No results for input(s): "TROPONINI", "TROPONINIHS" in the last 48 hours.  TSH: No results for input(s): "TSH" in the last 4320 hours.  Urine Culture: No results for input(s): "LABURIN" in the last 48 hours.  Urine Studies: No results for input(s): "COLORU", "APPEARANCEUA", "PHUR", "SPECGRAV", "PROTEINUA", "GLUCUA", "KETONESU", "BILIRUBINUA", "OCCULTUA", "NITRITE", "UROBILINOGEN", "LEUKOCYTESUR", "RBCUA", "WBCUA", "BACTERIA", "SQUAMEPITHEL", "HYALINECASTS" in the last 48 hours.    Invalid input(s): "WRIGHTSUR"    Significant Imaging:       Assessment & " Plan  Acute anemia    S/p 1 U PRBC 05/05/2025  Monitor H&H  Sutgery constuled  NM bleeding scan today  Coagulopathy  Continue to hold plavix and asa  Surgery consulted    Obesity  Body mass index is 37.27 kg/m². Morbid obesity complicates all aspects of disease management from diagnostic modalities to treatment. Weight loss encouraged and health benefits explained to patient.       Thrombocytopenia  The likely etiology of thrombocytopenia is unknown. The patients 3 most recent labs are listed below.  Recent Labs     05/05/25  1615 05/06/25  0450    124*     Plan  - Will transfuse if platelet count is <50k (if undergoing surgical procedure or have active bleeding).  -      GI bleed  Patient's hemorrhage is due to gastrointestinal bleed, this bleeding is associated with an anticoagulant, the anticoagulant is Select Anticoagulant(s): plavix, asa. Patients most recent Hgb, Hct, platelets, and INR are listed below.  Recent Labs     05/05/25  1615 05/05/25  1809 05/06/25  0208 05/06/25  0450 05/06/25  0805   HGB 6.5*   < > 7.4* 7.5* 7.5*   HCT 25.1*   < > 26.5* 26.5* 26.6*     --   --  124*  --     < > = values in this interval not displayed.     Plan  - Will trend hemoglobin/hematocrit Every 6 hours  - Will monitor and correct any coagulation defects  - Will transfuse if Hgb is <7g/dl (<8g/dl in cases of active ACS) or if patient has rapid bleeding leading to hemodynamic instability  -    Acute blood loss anemia  Anemia is likely due to acute blood loss which was from GI and Iron deficiency. Most recent hemoglobin and hematocrit are listed below.  Recent Labs     05/06/25  0208 05/06/25  0450 05/06/25  0805   HGB 7.4* 7.5* 7.5*   HCT 26.5* 26.5* 26.6*     Plan  - Monitor serial CBC: Every 6 hours  - Transfuse PRBC if patient becomes hemodynamically unstable, symptomatic or H/H drops below 7/21.  - Patient has received 1 units of PRBCs on 05/05/2025  - Patient's anemia is currently stable  -    Controlled  type 2 diabetes mellitus, without long-term current use of insulin  Patient's FSGs are controlled on current medication regimen.  Last A1c reviewed-   Lab Results   Component Value Date    HGBA1C 7.7 (H) 11/14/2024     Most recent fingerstick glucose reviewed-   Recent Labs   Lab 05/06/25  0755   POCTGLUCOSE 120*     Current correctional scale  Low  Maintain anti-hyperglycemic dose as follows-   Antihyperglycemics (From admission, onward)      Start     Stop Route Frequency Ordered    05/06/25 0900  empagliflozin (Jardiance) tablet 10 mg        Question Answer Comment   Does this patient have a diagnosis of heart failure? Yes    Does this patient have type 1 diabetes or diabetic ketoacidosis? No    Does this patient have symptomatic hypotension? No    Is the patient NPO or pending major surgery in next 3 days or less? No        -- Oral Daily 05/05/25 1937 05/06/25 0746  insulin aspart U-100 pen 0-5 Units         -- SubQ Before meals & nightly PRN 05/06/25 0647          Hold Oral hypoglycemics while patient is in the hospital.      Primary hypertension  Patient's blood pressure range in the last 24 hours was: BP  Min: 82/52  Max: 149/50.The patient's inpatient anti-hypertensive regimen is listed below:  Current Antihypertensives  , Daily, Oral  , 2 times daily, Oral  , 2 times daily, Oral  furosemide tablet 40 mg, 2 times daily, Oral  hydrALAZINE tablet 25 mg, 2 times daily, Oral  lisinopriL tablet 40 mg, Daily, Oral  metoprolol tartrate (LOPRESSOR) split tablet 12.5 mg, 2 times daily, Oral  amLODIPine tablet 5 mg, Daily, Oral    Plan  - BP is controlled, no changes needed to their regimen  -    VTE Risk Mitigation (From admission, onward)           Ordered     IP VTE HIGH RISK PATIENT  Once         05/05/25 1937     Place sequential compression device  Until discontinued         05/05/25 1937     Reason for No Pharmacological VTE Prophylaxis  Once        Question:  Reasons:  Answer:  Active Bleeding    05/05/25  1937                    Discharge Planning   AVELINO: 5/9/2025     Code Status: Full Code   Medical Readiness for Discharge Date:   Discharge Plan A: Return to nursing home                        Paz Palm MD  Department of Hospital Medicine   [facility]/Surg

## 2025-05-06 NOTE — PLAN OF CARE
Problem: Adult Inpatient Plan of Care  Goal: Plan of Care Review  Outcome: Progressing  Goal: Patient-Specific Goal (Individualized)  Outcome: Progressing  Goal: Absence of Hospital-Acquired Illness or Injury  Outcome: Progressing  Goal: Optimal Comfort and Wellbeing  Outcome: Progressing  Goal: Readiness for Transition of Care  Outcome: Progressing     Problem: Skin Injury Risk Increased  Goal: Skin Health and Integrity  Outcome: Progressing     Problem: Anemia  Goal: Anemia Symptom Improvement  Outcome: Progressing     Problem: Fall Injury Risk  Goal: Absence of Fall and Fall-Related Injury  Outcome: Progressing     Problem: Diabetes Comorbidity  Goal: Blood Glucose Level Within Targeted Range  Outcome: Progressing

## 2025-05-06 NOTE — ASSESSMENT & PLAN NOTE
Patient's blood pressure range in the last 24 hours was: BP  Min: 82/52  Max: 149/50.The patient's inpatient anti-hypertensive regimen is listed below:  Current Antihypertensives  , Daily, Oral  , 2 times daily, Oral  , 2 times daily, Oral  furosemide tablet 40 mg, 2 times daily, Oral  hydrALAZINE tablet 25 mg, 2 times daily, Oral  lisinopriL tablet 40 mg, Daily, Oral  metoprolol tartrate (LOPRESSOR) split tablet 12.5 mg, 2 times daily, Oral  amLODIPine tablet 5 mg, Daily, Oral    Plan  - BP is controlled, no changes needed to their regimen  -

## 2025-05-06 NOTE — ASSESSMENT & PLAN NOTE
Body mass index is 37.27 kg/m². Morbid obesity complicates all aspects of disease management from diagnostic modalities to treatment. Weight loss encouraged and health benefits explained to patient.

## 2025-05-06 NOTE — H&P
[facility] Medicine  History & Physical    Patient Name: Onesimo Booth  MRN: [id number]  Patient Class: OP- Observation  Admission Date: 5/5/2025  Attending Physician: Julisa Ambrosio MD  Primary Care Provider: Denys Mckeon III, MD         Patient information was obtained from via telened     Subjective:     Principal Problem:Acute anemia    Chief Complaint:   Chief Complaint   Patient presents with    Abnormal Labs     PT to ER via [facility] from [facility], report states PT has abnormal labs including low H&H.         HPI: 69-year-old male with hx of NIDDM, PVD Left BKA, HTN, Left CEA resident of NH on plavix and Eliquis not sure why was sent for abnormal lab and generalize weakness.  He was found on a CBC earlier today to be anemic, with a hemoglobin of 6.5.  This is new for him.  Denies Hematochezia or melena, no endoscopy reported he is accompanied by his daughter at bedside. Pt is Poor historian. Surgery consulted for scope, Pt is getting one unit of PRBC and protonix per ED    Past Medical History:   Diagnosis Date    Anxiety disorder, unspecified     Aphasia     CVA (cerebral vascular accident)     Depression     Diabetes mellitus     GERD (gastroesophageal reflux disease)     Hemiplegia     HTN (hypertension)     Mixed hyperlipidemia     Stroke        Past Surgical History:   Procedure Laterality Date    AMPUTATION, LOWER LIMB Left     BKA    CAROTID ENDARTERECTOMY Left     FRACTURE SURGERY      VASCULAR SURGERY         Review of patient's allergies indicates:  No Known Allergies    No current facility-administered medications on file prior to encounter.     Current Outpatient Medications on File Prior to Encounter   Medication Sig    amLODIPine (NORVASC) 2.5 MG tablet Take 10 mg by mouth once daily.    apixaban (ELIQUIS) 5 mg Tab Take 5 mg by mouth 2 (two) times daily.    clopidogreL (PLAVIX) 75 mg tablet Take 75 mg by mouth once daily.    empagliflozin  (JARDIANCE) 10 mg tablet Take 10 mg by mouth once daily.    ergocalciferol (VITAMIN D2) 50,000 unit Cap Take 50,000 Units by mouth every Tues, Fri.    famotidine (PEPCID) 20 MG tablet Take 20 mg by mouth 2 (two) times daily.    fenofibrate 160 MG Tab Take 160 mg by mouth nightly.    folic acid (FOLVITE) 1 MG tablet Take 1 mg by mouth once daily.    furosemide (LASIX) 40 MG tablet Take 40 mg by mouth 2 (two) times daily.    gabapentin (NEURONTIN) 300 MG capsule Take 300 mg by mouth 3 (three) times daily.    hydrALAZINE (APRESOLINE) 25 MG tablet Take 25 mg by mouth 2 (two) times daily.    HYDROcodone-acetaminophen (NORCO) 5-325 mg per tablet Take 1 tablet by mouth every 6 (six) hours as needed for Pain.    lisinopriL (PRINIVIL,ZESTRIL) 40 MG tablet Take 40 mg by mouth once daily.    metFORMIN (GLUCOPHAGE) 1000 MG tablet Take 1,000 mg by mouth 2 (two) times daily with meals.    metoprolol tartrate (LOPRESSOR) 25 MG tablet Take 12.5 mg by mouth 2 (two) times daily.    rosuvastatin (CRESTOR) 20 MG tablet Take 20 mg by mouth every evening.    sertraline (ZOLOFT) 100 MG tablet Take 100 mg by mouth once daily.    traZODone (DESYREL) 50 MG tablet Take 50 mg by mouth every evening.    busPIRone (BUSPAR) 5 MG Tab Take 5 mg by mouth 2 (two) times daily.     Family History    None       Tobacco Use    Smoking status: Former     Types: Cigarettes    Smokeless tobacco: Not on file   Substance and Sexual Activity    Alcohol use: Not Currently    Drug use: Not Currently    Sexual activity: Not on file     Review of Systems   Constitutional:  Positive for fatigue.   HENT: Negative.     Eyes: Negative.    Respiratory: Negative.     Cardiovascular: Negative.    Gastrointestinal: Negative.    Endocrine: Negative.    Genitourinary: Negative.    Musculoskeletal: Negative.    Skin: Negative.    Allergic/Immunologic: Negative.    Neurological: Negative.    Hematological: Negative.    Psychiatric/Behavioral: Negative.       Objective:  "    Vital Signs (Most Recent):  Temp: 98.2 °F (36.8 °C) (05/05/25 1925)  Pulse: 78 (05/05/25 1925)  Resp: 18 (05/05/25 1925)  BP: (!) 140/58 (05/05/25 1925)  SpO2: 95 % (05/05/25 1925) Vital Signs (24h Range):  Temp:  [98.2 °F (36.8 °C)-98.4 °F (36.9 °C)] 98.2 °F (36.8 °C)  Pulse:  [75-82] 78  Resp:  [15-23] 18  SpO2:  [95 %-96 %] 95 %  BP: (102-149)/(50-58) 140/58     Weight: 124.7 kg (275 lb)  Body mass index is 37.3 kg/m².     Physical Exam  Constitutional:       Appearance: Normal appearance. He is obese.   HENT:      Mouth/Throat:      Mouth: Mucous membranes are moist.   Eyes:      Extraocular Movements: Extraocular movements intact.      Conjunctiva/sclera: Conjunctivae normal.      Pupils: Pupils are equal, round, and reactive to light.   Cardiovascular:      Rate and Rhythm: Normal rate and regular rhythm.      Pulses: Normal pulses.      Heart sounds: Normal heart sounds.   Pulmonary:      Effort: Pulmonary effort is normal.      Breath sounds: Normal breath sounds.   Abdominal:      General: Abdomen is flat. Bowel sounds are normal.      Palpations: Abdomen is soft.   Musculoskeletal:      Cervical back: Normal range of motion and neck supple.      Comments: Left BKA with dressing on left stump    Neurological:      General: No focal deficit present.      Mental Status: He is alert and oriented to person, place, and time.   Psychiatric:         Mood and Affect: Mood normal.              CRANIAL NERVES     CN III, IV, VI   Pupils are equal, round, and reactive to light.       Significant Labs: All pertinent labs within the past 24 hours have been reviewed.  A1C:   Recent Labs   Lab 11/14/24  1115   HGBA1C 7.7*     ABGs: No results for input(s): "PH", "PCO2", "HCO3", "POCSATURATED", "BE", "TOTALHB", "COHB", "METHB", "O2HB", "POCFIO2", "PO2" in the last 48 hours.  Bilirubin:   Recent Labs   Lab 05/05/25  1809   BILITOT 0.4     Blood Culture: No results for input(s): "LABBLOO" in the last 48 hours.  BMP: " "  Recent Labs   Lab 25  1809   *      K 4.4      CO2 29   BUN 34*   CREATININE 0.86   CALCIUM 8.3*     CBC:   Recent Labs   Lab 25  1615 25  1809   WBC 8.47  --    HGB 6.5* 7.0*   HCT 25.1* 25.9*     --      CMP:   Recent Labs   Lab 25  1809      K 4.4      CO2 29   *   BUN 34*   CREATININE 0.86   CALCIUM 8.3*   PROT 6.9   ALBUMIN 4.0   BILITOT 0.4   ALKPHOS 61   AST 27   ALT 24     Cardiac Markers: No results for input(s): "CKMB", "MYOGLOBIN", "BNP", "TROPISTAT" in the last 48 hours.  Coagulation: No results for input(s): "PT", "INR", "APTT" in the last 48 hours.  Lactic Acid: No results for input(s): "LACTATE" in the last 48 hours.  Lipase: No results for input(s): "LIPASE" in the last 48 hours.  Lipid Panel: No results for input(s): "CHOL", "HDL", "LDLCALC", "TRIG", "CHOLHDL" in the last 48 hours.  Magnesium: No results for input(s): "MG" in the last 48 hours.  Pathology Results  (Last 10 years)                 23 1320  Specimen to Pathology Final result    Narrative:  Patient - KEEGAN SMITH                -  Sex- M   Med Rec # - [id number]                        [place] Rosen   The following is an electronic copy of report # [id number] from:    [facility]   [address] [place] [address]                          Phone [phone]   DIAGNOSIS:   2023      RBW:liseth   BLOOD VESSEL, LEFT CAROTID, ENDARTERECTOMY:   - PARTIALLY CALCIFIED ATHEROMATOUS PLAQUE.     _______________________________________________________________________   SPECIMEN AND SOURCE:   LEFT CAROTID PLAQUE   CLINICAL INFORMATION:   OCCLUSION AND STENOSIS OF LEFT CAROTID ARTERY   GROSS EXAMINATION:   2023  WO/[facility]   Received in formalin, labeled with the patient's name, "Leobardo Keegan Le," medical record number, "left carotid plaque," is a firm portion    of tubular white-tan tissue, 5.4 " "cm in length and 0.6 to 1 cm in    diameter.  The lining is smooth, white-tan with focal firm white areas    and pale yellow calcifications within the wall.  Representative sections    submitted in 1A.     MICROSCOPIC DESCRIPTION:   Unless gross only is specified, the final diagnosis for each specimen is    based on a microscopic examination of representative sections of tissue    by digitally imaged slide(s), to include immunohistochemical and special    stains if performed.    CODE: 0   Pathologist: DEMAR Elizondo MD, FCAP (Electronic Signature)    03/13/2023 4:44 PM    [facility], [facility] * [address] *    [place] [address]    Technical services performed at: [facility], [facility] * [address] [place] [address] * [place] [address]    Gross examination performed at: [facility], [facility] * [address] [place] [address] * [place] [phone]              POCT Glucose: No results for input(s): "POCTGLUCOSE" in the last 48 hours.  Prealbumin: No results for input(s): "PREALBUMIN" in the last 48 hours.  Respiratory Culture: No results for input(s): "GSRESP", "RESPIRATORYC" in the last 48 hours.  Troponin: No results for input(s): "TROPONINI", "TROPONINIHS" in the last 48 hours.  TSH: No results for input(s): "TSH" in the last 4320 hours.  Urine Culture: No results for input(s): "LABURIN" in the last 48 hours.  Urine Studies: No results for input(s): "COLORU", "APPEARANCEUA", "PHUR", "SPECGRAV", "PROTEINUA", "GLUCUA", "KETONESU", "BILIRUBINUA", "OCCULTUA", "NITRITE", "UROBILINOGEN", "LEUKOCYTESUR", "RBCUA", "WBCUA", "BACTERIA", "SQUAMEPITHEL", "HYALINECASTS" in the last 48 hours.    Invalid input(s): "WRIGHTSUR"    Significant Imaging:   Assessment/Plan:   - Symptomatic anemia check Iron studies, Type and cross one unit PRBC. Check Stool for hemoccult. Surgery consulted for scope, on PPI  -HTN continue on home regimen reduced Norvasc 5 mg.   -DM hold Metformin, check Hgb AC, " accu check and SSI  -Hx CVA Left CEA hold plavix, continue on Statin  - Left stump wound wound care in AM  -DVT ppx on SCD  Pt is full code, SDM is his daughter Darlene Bustillo  Pt is seen and examined via telemed. Pt is in [facility] I am in [place], [place]. Nursing staff assisted with evaluation. Software is Audio/Video  Pt is being admitted as an observation to the hopsitalist service for further eval and treat  Assessment & Plan  Acute anemia    Coagulopathy      VTE Risk Mitigation (From admission, onward)           Ordered     IP VTE HIGH RISK PATIENT  Once         05/05/25 1937     Place sequential compression device  Until discontinued         05/05/25 1937     Reason for No Pharmacological VTE Prophylaxis  Once        Question:  Reasons:  Answer:  Active Bleeding    05/05/25 1937                       On 05/05/2025, patient should be placed in hospital observation services under my care.             Julisa Ambrosio MD  Department of Hospital Medicine  [facility]

## 2025-05-06 NOTE — HOSPITAL COURSE
05/06/2025 pt admitted with GI bleed.  S/p 1 U PRBCS and hgb at 7.5 stable last several draws.    05/07/2025 pt s/p EGD shows ulver of duodenum,.  Multiple polyps removed, lipoma and diverticuli and hemorrhoids. Pt H&H stable after 1 U and no c/o at present  05/08/2025 pt with GI bleed, some SOB not resolved with transfusion, still requires oxygen sats 89-90% on RA at rest.  Has not been on oxygen in the past, last echo [facility] 06/2024 EF was normal.  Pt denies SOB and denies chest pain, no cough or fever or WBC  CXR this am mild radha pulmonary edema, chronic RLE and RUE edema iproved since admit.    Will get echo and continue to wean oxygen as tolerated.  May need prn oxygen at the nursing home.  05/09/2025 admitted with GIB, still with SOB and requries oxygen, currently on 1-2L daughter says he dropped in the 70s while sleeping, does not hear him snoring, nurse documents sats in the 90s.  CXR did show some mild pulmonary edema, gave lasix yesterday, BP low this am reviewed nursing home mar and apparently they had stopped hydralazine the day he was sent here due to lower BP there.  BP meds have been held offf and on last 2-3 days. Gave 500cc NS bolus this am, he is awake and alert at his mental status baseline.  Anemia is iron deficiency and he is received ferrlicit x 3 doses.  Echo looks stable from old echo from [facility] back in 2023.  Daughter reports he seems to choke and cough with eating at times and this is new.  SLP has been consulted.  05/10/2025 SLP plans for MBS on Monday, pt still with cough, nurse reports eating well no choking or coughin and he is swallowing his pills whole, sats are 97% on 2L, will wean oxygen.  VM placed at 0100 due to sats at 87%  05/11/2025 tolerated BIPAP last night and no desaturations,tolerating diet with no choking.  Awaiting MBS for Monday no bedside evidence of dysphagia.  Other labs stable.  05/12/2025 DISCHARGE SUMMARY: pt admitted with GI bleed, s/p 1 U PRBC, found to have ulcer  and hiatal hernia with polpys in colon on EGD and colonoscopy.  Pt was having some SOB, Echo looks stable since 2023, CXR no infiltrates.  Pt also received 3 doses of ferlicit.  He was evaluated by SLP due to some reported coughing with meals by the daughter, he takes his medications and meals without difficulty per nursing staff.  SLP evaluated and does not feel he has any swallowing issues. He is ready for d/c back to the nursing home.

## 2025-05-07 ENCOUNTER — ANESTHESIA (OUTPATIENT)
Dept: GASTROENTEROLOGY | Facility: HOSPITAL | Age: 70
End: 2025-05-07
Payer: MEDICARE

## 2025-05-07 ENCOUNTER — ANESTHESIA EVENT (OUTPATIENT)
Dept: GASTROENTEROLOGY | Facility: HOSPITAL | Age: 70
End: 2025-05-07
Payer: MEDICARE

## 2025-05-07 PROBLEM — K57.90 DIVERTICULOSIS: Status: ACTIVE | Noted: 2025-05-07

## 2025-05-07 PROBLEM — K64.9 HEMORRHOID: Status: ACTIVE | Noted: 2025-05-07

## 2025-05-07 PROBLEM — K63.5 COLON POLYPS: Status: ACTIVE | Noted: 2025-05-07

## 2025-05-07 PROBLEM — D17.5 LIPOMA OF COLON: Status: ACTIVE | Noted: 2025-05-07

## 2025-05-07 PROBLEM — K26.9 DUODENAL ULCER: Status: ACTIVE | Noted: 2025-05-07

## 2025-05-07 LAB
ANION GAP SERPL CALC-SCNC: 2 MEQ/L (ref 2–13)
BASOPHILS # BLD AUTO: 0.04 X10(3)/MCL (ref 0.01–0.08)
BASOPHILS NFR BLD AUTO: 0.7 % (ref 0.1–1.2)
BUN SERPL-MCNC: 24 MG/DL (ref 7–20)
CALCIUM SERPL-MCNC: 8.1 MG/DL (ref 8.4–10.2)
CHLORIDE SERPL-SCNC: 104 MMOL/L (ref 98–110)
CO2 SERPL-SCNC: 33 MMOL/L (ref 21–32)
CREAT SERPL-MCNC: 0.79 MG/DL (ref 0.66–1.25)
CREAT/UREA NIT SERPL: 30 (ref 12–20)
EOSINOPHIL # BLD AUTO: 0.05 X10(3)/MCL (ref 0.04–0.54)
EOSINOPHIL NFR BLD AUTO: 0.9 % (ref 0.7–7)
ERYTHROCYTE [DISTWIDTH] IN BLOOD BY AUTOMATED COUNT: 17.2 %
GFR SERPLBLD CREATININE-BSD FMLA CKD-EPI: >90 ML/MIN/1.73/M2
GLUCOSE SERPL-MCNC: 111 MG/DL (ref 70–115)
HCT VFR BLD AUTO: 28.1 % (ref 36–52)
HGB BLD-MCNC: 7.9 G/DL (ref 13–18)
IMM GRANULOCYTES # BLD AUTO: 0.17 X10(3)/MCL (ref 0–0.03)
IMM GRANULOCYTES NFR BLD AUTO: 2.9 % (ref 0–0.5)
LYMPHOCYTES # BLD AUTO: 1.02 X10(3)/MCL (ref 1.32–3.57)
LYMPHOCYTES NFR BLD AUTO: 17.4 % (ref 20–55)
MCH RBC QN AUTO: 22.6 PG (ref 27–34)
MCHC RBC AUTO-ENTMCNC: 28.1 G/DL (ref 31–37)
MCV RBC AUTO: 80.5 FL (ref 79–99)
MONOCYTES # BLD AUTO: 0.64 X10(3)/MCL (ref 0.3–0.82)
MONOCYTES NFR BLD AUTO: 10.9 % (ref 4.7–12.5)
NEUTROPHILS # BLD AUTO: 3.93 X10(3)/MCL (ref 1.78–5.38)
NEUTROPHILS NFR BLD AUTO: 67.2 % (ref 37–73)
NRBC BLD AUTO-RTO: 0.9 %
PLATELET # BLD AUTO: 129 X10(3)/MCL (ref 140–371)
PMV BLD AUTO: 10.5 FL (ref 9.4–12.4)
POCT GLUCOSE: 116 MG/DL (ref 70–110)
POCT GLUCOSE: 138 MG/DL (ref 70–110)
POCT GLUCOSE: 140 MG/DL (ref 70–110)
POCT GLUCOSE: 149 MG/DL (ref 70–110)
POCT GLUCOSE: 176 MG/DL (ref 70–110)
POTASSIUM SERPL-SCNC: 4.2 MMOL/L (ref 3.5–5.1)
RBC # BLD AUTO: 3.49 X10(6)/MCL (ref 4–6)
SODIUM SERPL-SCNC: 139 MMOL/L (ref 136–145)
WBC # BLD AUTO: 5.85 X10(3)/MCL (ref 4–11.5)

## 2025-05-07 PROCEDURE — 99900035 HC TECH TIME PER 15 MIN (STAT)

## 2025-05-07 PROCEDURE — 63600175 PHARM REV CODE 636 W HCPCS: Performed by: FAMILY MEDICINE

## 2025-05-07 PROCEDURE — 0DB98ZX EXCISION OF DUODENUM, VIA NATURAL OR ARTIFICIAL OPENING ENDOSCOPIC, DIAGNOSTIC: ICD-10-PCS | Performed by: SURGERY

## 2025-05-07 PROCEDURE — 36415 COLL VENOUS BLD VENIPUNCTURE: CPT | Performed by: FAMILY MEDICINE

## 2025-05-07 PROCEDURE — 43239 EGD BIOPSY SINGLE/MULTIPLE: CPT | Performed by: SURGERY

## 2025-05-07 PROCEDURE — 85025 COMPLETE CBC W/AUTO DIFF WBC: CPT | Performed by: FAMILY MEDICINE

## 2025-05-07 PROCEDURE — 63600175 PHARM REV CODE 636 W HCPCS

## 2025-05-07 PROCEDURE — 86923 COMPATIBILITY TEST ELECTRIC: CPT | Performed by: SURGERY

## 2025-05-07 PROCEDURE — 25000003 PHARM REV CODE 250: Performed by: FAMILY MEDICINE

## 2025-05-07 PROCEDURE — 80048 BASIC METABOLIC PNL TOTAL CA: CPT | Performed by: FAMILY MEDICINE

## 2025-05-07 PROCEDURE — 27000221 HC OXYGEN, UP TO 24 HOURS

## 2025-05-07 PROCEDURE — 45380 COLONOSCOPY AND BIOPSY: CPT | Mod: XS | Performed by: SURGERY

## 2025-05-07 PROCEDURE — 0DB48ZX EXCISION OF ESOPHAGOGASTRIC JUNCTION, VIA NATURAL OR ARTIFICIAL OPENING ENDOSCOPIC, DIAGNOSTIC: ICD-10-PCS | Performed by: SURGERY

## 2025-05-07 PROCEDURE — 0DB78ZX EXCISION OF STOMACH, PYLORUS, VIA NATURAL OR ARTIFICIAL OPENING ENDOSCOPIC, DIAGNOSTIC: ICD-10-PCS | Performed by: SURGERY

## 2025-05-07 PROCEDURE — 25000003 PHARM REV CODE 250: Performed by: SURGERY

## 2025-05-07 PROCEDURE — 94761 N-INVAS EAR/PLS OXIMETRY MLT: CPT

## 2025-05-07 PROCEDURE — 63600175 PHARM REV CODE 636 W HCPCS: Performed by: NURSE ANESTHETIST, CERTIFIED REGISTERED

## 2025-05-07 PROCEDURE — 11000001 HC ACUTE MED/SURG PRIVATE ROOM

## 2025-05-07 PROCEDURE — 25000003 PHARM REV CODE 250

## 2025-05-07 PROCEDURE — 45385 COLONOSCOPY W/LESION REMOVAL: CPT | Performed by: SURGERY

## 2025-05-07 PROCEDURE — 25000003 PHARM REV CODE 250: Performed by: NURSE ANESTHETIST, CERTIFIED REGISTERED

## 2025-05-07 PROCEDURE — 0DBK8ZZ EXCISION OF ASCENDING COLON, VIA NATURAL OR ARTIFICIAL OPENING ENDOSCOPIC: ICD-10-PCS | Performed by: SURGERY

## 2025-05-07 RX ORDER — PANTOPRAZOLE SODIUM 40 MG/1
40 TABLET, DELAYED RELEASE ORAL DAILY
Status: DISCONTINUED | OUTPATIENT
Start: 2025-05-07 | End: 2025-05-12 | Stop reason: HOSPADM

## 2025-05-07 RX ORDER — SUCRALFATE 1 G/1
1 TABLET ORAL
Status: DISCONTINUED | OUTPATIENT
Start: 2025-05-07 | End: 2025-05-12 | Stop reason: HOSPADM

## 2025-05-07 RX ORDER — GLYCOPYRROLATE 0.2 MG/ML
0.1 INJECTION INTRAMUSCULAR; INTRAVENOUS ONCE
Status: DISCONTINUED | OUTPATIENT
Start: 2025-05-07 | End: 2025-05-07

## 2025-05-07 RX ORDER — HYDROCODONE BITARTRATE AND ACETAMINOPHEN 500; 5 MG/1; MG/1
TABLET ORAL
Status: DISCONTINUED | OUTPATIENT
Start: 2025-05-07 | End: 2025-05-12 | Stop reason: HOSPADM

## 2025-05-07 RX ORDER — GLYCOPYRROLATE 0.2 MG/ML
0.1 INJECTION INTRAMUSCULAR; INTRAVENOUS
Status: DISCONTINUED | OUTPATIENT
Start: 2025-05-07 | End: 2025-05-12 | Stop reason: HOSPADM

## 2025-05-07 RX ORDER — PROPOFOL 10 MG/ML
VIAL (ML) INTRAVENOUS
Status: DISCONTINUED | OUTPATIENT
Start: 2025-05-07 | End: 2025-05-07

## 2025-05-07 RX ADMIN — PANTOPRAZOLE SODIUM 40 MG: 40 TABLET, DELAYED RELEASE ORAL at 03:05

## 2025-05-07 RX ADMIN — PROPOFOL 100 MG: 10 INJECTION, EMULSION INTRAVENOUS at 10:05

## 2025-05-07 RX ADMIN — PROPOFOL 50 MG: 10 INJECTION, EMULSION INTRAVENOUS at 10:05

## 2025-05-07 RX ADMIN — METOPROLOL TARTRATE 12.5 MG: 25 TABLET, FILM COATED ORAL at 08:05

## 2025-05-07 RX ADMIN — SUCRALFATE 1 G: 1 TABLET ORAL at 08:05

## 2025-05-07 RX ADMIN — FUROSEMIDE 40 MG: 40 TABLET ORAL at 08:05

## 2025-05-07 RX ADMIN — FAMOTIDINE 20 MG: 20 TABLET, FILM COATED ORAL at 08:05

## 2025-05-07 RX ADMIN — PANTOPRAZOLE SODIUM 8 MG/HR: 40 INJECTION, POWDER, FOR SOLUTION INTRAVENOUS at 01:05

## 2025-05-07 RX ADMIN — MICONAZOLE NITRATE 2 % TOPICAL POWDER: at 08:05

## 2025-05-07 RX ADMIN — HYDRALAZINE HYDROCHLORIDE 25 MG: 25 TABLET ORAL at 08:05

## 2025-05-07 RX ADMIN — GABAPENTIN 300 MG: 300 CAPSULE ORAL at 08:05

## 2025-05-07 RX ADMIN — SODIUM CHLORIDE 125 MG: 9 INJECTION, SOLUTION INTRAVENOUS at 11:05

## 2025-05-07 RX ADMIN — SODIUM CHLORIDE: 9 INJECTION, SOLUTION INTRAVENOUS at 10:05

## 2025-05-07 RX ADMIN — PANTOPRAZOLE SODIUM 8 MG/HR: 40 INJECTION, POWDER, FOR SOLUTION INTRAVENOUS at 06:05

## 2025-05-07 RX ADMIN — TRAZODONE HYDROCHLORIDE 50 MG: 50 TABLET ORAL at 08:05

## 2025-05-07 RX ADMIN — POLYETHYLENE GLYCOL 3350 17 G: 17 POWDER, FOR SOLUTION ORAL at 12:05

## 2025-05-07 RX ADMIN — HYDROCODONE BITARTRATE AND ACETAMINOPHEN 1 TABLET: 5; 325 TABLET ORAL at 03:05

## 2025-05-07 RX ADMIN — GABAPENTIN 300 MG: 300 CAPSULE ORAL at 03:05

## 2025-05-07 RX ADMIN — HYDROCODONE BITARTRATE AND ACETAMINOPHEN 1 TABLET: 5; 325 TABLET ORAL at 10:05

## 2025-05-07 RX ADMIN — SUCRALFATE 1 G: 1 TABLET ORAL at 04:05

## 2025-05-07 RX ADMIN — MICONAZOLE NITRATE 2 % TOPICAL POWDER: at 12:05

## 2025-05-07 RX ADMIN — BUSPIRONE HYDROCHLORIDE 5 MG: 5 TABLET ORAL at 08:05

## 2025-05-07 NOTE — ASSESSMENT & PLAN NOTE
The likely etiology of thrombocytopenia is unknown. The patients 3 most recent labs are listed below.  Recent Labs     05/05/25  1615 05/06/25  0450 05/07/25  0825    124* 129*     Plan  - Will transfuse if platelet count is <50k (if undergoing surgical procedure or have active bleeding).  -

## 2025-05-07 NOTE — ASSESSMENT & PLAN NOTE
Anemia is likely due to acute blood loss which was from GI and Iron deficiency. Most recent hemoglobin and hematocrit are listed below.  Recent Labs     05/06/25  0805 05/06/25  1607 05/07/25  0825   HGB 7.5* 7.4* 7.9*   HCT 26.6* 26.0* 28.1*     Plan  - Monitor serial CBC: Every 6 hours  - Transfuse PRBC if patient becomes hemodynamically unstable, symptomatic or H/H drops below 7/21.  - Patient has received 1 units of PRBCs on 05/05/2025  - Patient's anemia is currently stable  -

## 2025-05-07 NOTE — ASSESSMENT & PLAN NOTE
Patient's hemorrhage is due to gastrointestinal bleed, this bleeding is associated with an anticoagulant, the anticoagulant is Select Anticoagulant(s): plavix, asa. Patients most recent Hgb, Hct, platelets, and INR are listed below.  Recent Labs     05/05/25  1615 05/05/25  1809 05/06/25  0450 05/06/25  0805 05/06/25  1607 05/07/25  0825   HGB 6.5*   < > 7.5* 7.5* 7.4* 7.9*   HCT 25.1*   < > 26.5* 26.6* 26.0* 28.1*     --  124*  --   --  129*    < > = values in this interval not displayed.     Plan  - Will trend hemoglobin/hematocrit Every 6 hours  - Will monitor and correct any coagulation defects  - Will transfuse if Hgb is <7g/dl (<8g/dl in cases of active ACS) or if patient has rapid bleeding leading to hemodynamic instability  -  s/p 1 U PRBC  S/p egd and colonosocopy today

## 2025-05-07 NOTE — PLAN OF CARE
05/07/25 1406   Discharge Reassessment   Assessment Type Discharge Planning Reassessment   Did the patient's condition or plan change since previous assessment? No   Discharge Plan discussed with: Patient   Communicated AVELINO with patient/caregiver Yes   Discharge Plan A Return to nursing home   Discharge Plan B Return to Nursing Home   DME Needed Upon Discharge  none   Transition of Care Barriers Mobility   Why the patient remains in the hospital Requires continued medical care   Post-Acute Status   Coverage medicare   Discharge Delays None known at this time
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
## 2025-05-07 NOTE — H&P
The patient has been examined and the H&P has been reviewed:    I concur with the findings and no changes have occurred since H&P was written.    Staff present during examination : Maria Luisa Sanders RN    Patient cleared for Anesthesia: MAC    Anesthesia/Surgery risks, benefits and alternative options discussed and understood by patient/family.    I have reviewed and agree with Anesthesia Plan of Care.    Active Hospital Problems    Diagnosis  POA    *Acute blood loss anemia [D62]  Yes    Obesity [E66.9]  Yes    Thrombocytopenia [D69.6]  Yes    GI bleed [K92.2]  Yes    Controlled type 2 diabetes mellitus, without long-term current use of insulin [E11.9]  Yes    Primary hypertension [I10]  Yes    Coagulopathy [D68.9]  Yes    Acute anemia [D64.9]  Yes      Resolved Hospital Problems   No resolved problems to display.

## 2025-05-07 NOTE — SUBJECTIVE & OBJECTIVE
Past Medical History:   Diagnosis Date    Anticoagulant long-term use     Anxiety disorder, unspecified     Aphasia     CVA (cerebral vascular accident)     Depression     Diabetes mellitus     GERD (gastroesophageal reflux disease)     Hemiplegia     HTN (hypertension)     Mixed hyperlipidemia     Stroke     Unspecified atrial flutter        Past Surgical History:   Procedure Laterality Date    AMPUTATION, LOWER LIMB Left     BKA    CAROTID ENDARTERECTOMY Left     FRACTURE SURGERY      VASCULAR SURGERY         Review of patient's allergies indicates:  No Known Allergies    No current facility-administered medications on file prior to encounter.     Current Outpatient Medications on File Prior to Encounter   Medication Sig    amLODIPine (NORVASC) 2.5 MG tablet Take 10 mg by mouth once daily.    apixaban (ELIQUIS) 5 mg Tab Take 5 mg by mouth 2 (two) times daily.    clopidogreL (PLAVIX) 75 mg tablet Take 75 mg by mouth once daily.    empagliflozin (JARDIANCE) 10 mg tablet Take 10 mg by mouth once daily.    ergocalciferol (VITAMIN D2) 50,000 unit Cap Take 50,000 Units by mouth every Tues, Fri.    famotidine (PEPCID) 20 MG tablet Take 20 mg by mouth 2 (two) times daily.    fenofibrate 160 MG Tab Take 160 mg by mouth nightly.    folic acid (FOLVITE) 1 MG tablet Take 1 mg by mouth once daily.    furosemide (LASIX) 40 MG tablet Take 40 mg by mouth once daily.    gabapentin (NEURONTIN) 300 MG capsule Take 300 mg by mouth 3 (three) times daily.    hydrALAZINE (APRESOLINE) 25 MG tablet Take 25 mg by mouth 2 (two) times daily.    HYDROcodone-acetaminophen (NORCO) 5-325 mg per tablet Take 1 tablet by mouth every 6 (six) hours as needed for Pain.    lisinopriL (PRINIVIL,ZESTRIL) 40 MG tablet Take 40 mg by mouth once daily.    metFORMIN (GLUCOPHAGE) 1000 MG tablet Take 1,000 mg by mouth 2 (two) times daily with meals.    metoprolol tartrate (LOPRESSOR) 25 MG tablet Take 12.5 mg by mouth 2 (two) times daily.    rosuvastatin  (CRESTOR) 20 MG tablet Take 20 mg by mouth every evening.    sertraline (ZOLOFT) 100 MG tablet Take 100 mg by mouth once daily.    traZODone (DESYREL) 50 MG tablet Take 50 mg by mouth every evening.    busPIRone (BUSPAR) 5 MG Tab Take 5 mg by mouth 2 (two) times daily.     Family History    None       Tobacco Use    Smoking status: Former     Types: Cigarettes    Smokeless tobacco: Not on file   Substance and Sexual Activity    Alcohol use: Not Currently    Drug use: Not Currently    Sexual activity: Not on file     Review of Systems   Constitutional:  Positive for fatigue.   HENT: Negative.     Eyes: Negative.    Respiratory: Negative.     Cardiovascular: Negative.    Gastrointestinal: Negative.    Endocrine: Negative.    Genitourinary: Negative.    Musculoskeletal: Negative.    Skin: Negative.    Allergic/Immunologic: Negative.    Neurological: Negative.    Hematological: Negative.    Psychiatric/Behavioral: Negative.       Objective:     Vital Signs (Most Recent):  Temp: 97.8 °F (36.6 °C) (05/07/25 1155)  Pulse: 76 (05/07/25 1155)  Resp: 18 (05/07/25 1155)  BP: (!) 98/58 (05/07/25 1155)  SpO2: 98 % (05/07/25 1155) Vital Signs (24h Range):  Temp:  [97.6 °F (36.4 °C)-98.6 °F (37 °C)] 97.8 °F (36.6 °C)  Pulse:  [] 76  Resp:  [16-22] 18  SpO2:  [94 %-100 %] 98 %  BP: ()/(47-67) 98/58     Weight: 124.6 kg (274 lb 12.8 oz)  Body mass index is 37.27 kg/m².     Physical Exam  Constitutional:       Appearance: Normal appearance. He is obese.   HENT:      Mouth/Throat:      Mouth: Mucous membranes are moist.   Eyes:      Extraocular Movements: Extraocular movements intact.      Conjunctiva/sclera: Conjunctivae normal.      Pupils: Pupils are equal, round, and reactive to light.   Cardiovascular:      Rate and Rhythm: Normal rate and regular rhythm.      Pulses: Normal pulses.      Heart sounds: Normal heart sounds.   Pulmonary:      Effort: Pulmonary effort is normal.      Breath sounds: Normal breath sounds.  "  Abdominal:      General: Abdomen is flat. Bowel sounds are normal.      Palpations: Abdomen is soft.   Musculoskeletal:      Cervical back: Normal range of motion and neck supple.      Comments: Left BKA with dressing on left stump    Neurological:      General: No focal deficit present.      Mental Status: He is alert and oriented to person, place, and time.   Psychiatric:         Mood and Affect: Mood normal.              CRANIAL NERVES     CN III, IV, VI   Pupils are equal, round, and reactive to light.       Significant Labs: All pertinent labs within the past 24 hours have been reviewed.  A1C:   Recent Labs   Lab 11/14/24  1115   HGBA1C 7.7*     ABGs: No results for input(s): "PH", "PCO2", "HCO3", "POCSATURATED", "BE", "TOTALHB", "COHB", "METHB", "O2HB", "POCFIO2", "PO2" in the last 48 hours.  Bilirubin:   Recent Labs   Lab 05/05/25  1809   BILITOT 0.4     Blood Culture: No results for input(s): "LABBLOO" in the last 48 hours.  BMP:   Recent Labs   Lab 05/07/25  0739         K 4.2      CO2 33*   BUN 24*   CREATININE 0.79   CALCIUM 8.1*     CBC:   Recent Labs   Lab 05/05/25  1615 05/05/25  1809 05/06/25  0450 05/06/25  0805 05/06/25  1607 05/07/25  0825   WBC 8.47  --  6.82  --   --  5.85   HGB 6.5*   < > 7.5* 7.5* 7.4* 7.9*   HCT 25.1*   < > 26.5* 26.6* 26.0* 28.1*     --  124*  --   --  129*    < > = values in this interval not displayed.     CMP:   Recent Labs   Lab 05/05/25  1809 05/06/25  0450 05/07/25  0739    139 139   K 4.4 4.2 4.2    106 104   CO2 29 31 33*   * 115 111   BUN 34* 31* 24*   CREATININE 0.86 0.92 0.79   CALCIUM 8.3* 8.0* 8.1*   PROT 6.9  --   --    ALBUMIN 4.0  --   --    BILITOT 0.4  --   --    ALKPHOS 61  --   --    AST 27  --   --    ALT 24  --   --      Cardiac Markers: No results for input(s): "CKMB", "MYOGLOBIN", "BNP", "TROPISTAT" in the last 48 hours.  Coagulation: No results for input(s): "PT", "INR", "APTT" in the last 48 " "hours.  Lactic Acid: No results for input(s): "LACTATE" in the last 48 hours.  Lipase: No results for input(s): "LIPASE" in the last 48 hours.  Lipid Panel: No results for input(s): "CHOL", "HDL", "LDLCALC", "TRIG", "CHOLHDL" in the last 48 hours.  Magnesium: No results for input(s): "MG" in the last 48 hours.  Pathology Results  (Last 10 years)                 23 1320  Specimen to Pathology Final result    Narrative:  Patient - KEEGAN BOOTH                -  Sex- M   Med Rec # - [id number]                        [facility]   The following is an electronic copy of report # [id number] from:    [facility]   [address] [place] [address]                          Phone [phone]   DIAGNOSIS:   2023      RBW:liseth   BLOOD VESSEL, LEFT CAROTID, ENDARTERECTOMY:   - PARTIALLY CALCIFIED ATHEROMATOUS PLAQUE.     _______________________________________________________________________   SPECIMEN AND SOURCE:   LEFT CAROTID PLAQUE   CLINICAL INFORMATION:   OCCLUSION AND STENOSIS OF LEFT CAROTID ARTERY   GROSS EXAMINATION:   2023  WO/[facility]   Received in formalin, labeled with the patient's name, "Keegan Booth," medical record number, "left carotid plaque," is a firm portion    of tubular white-tan tissue, 5.4 cm in length and 0.6 to 1 cm in    diameter.  The lining is smooth, white-tan with focal firm white areas    and pale yellow calcifications within the wall.  Representative sections    submitted in 1A.     MICROSCOPIC DESCRIPTION:   Unless gross only is specified, the final diagnosis for each specimen is    based on a microscopic examination of representative sections of tissue    by digitally imaged slide(s), to include immunohistochemical and special    stains if performed.    CODE: 0   Pathologist: DEMAR Elizondo MD, FCAP (Electronic Signature)    2023 4:44 PM    [facility], [facility] * [id number] " [address]. *    [place] [address]    Technical services performed at: [facility], [facility] * [address] [place] [address] * [place] [phone]    Gross examination performed at: [facility], [facility] * [address] [place] [address] * [place] [address]              POCT Glucose:   Recent Labs   Lab 05/06/25 2004 05/07/25  0730 05/07/25  1127   POCTGLUCOSE 151* 116* 138*     Prealbumin: No results for input(s): "PREALBUMIN" in the last 48 hours.  Respiratory Culture: No results for input(s): "GSRESP", "RESPIRATORYC" in the last 48 hours.  Troponin: No results for input(s): "TROPONINI", "TROPONINIHS" in the last 48 hours.  TSH: No results for input(s): "TSH" in the last 4320 hours.  Urine Culture: No results for input(s): "LABURIN" in the last 48 hours.  Urine Studies: No results for input(s): "COLORU", "APPEARANCEUA", "PHUR", "SPECGRAV", "PROTEINUA", "GLUCUA", "KETONESU", "BILIRUBINUA", "OCCULTUA", "NITRITE", "UROBILINOGEN", "LEUKOCYTESUR", "RBCUA", "WBCUA", "BACTERIA", "SQUAMEPITHEL", "HYALINECASTS" in the last 48 hours.    Invalid input(s): "WRIGHTSUR"    Significant Imaging:   "

## 2025-05-07 NOTE — ASSESSMENT & PLAN NOTE
Patient's FSGs are controlled on current medication regimen.  Last A1c reviewed-   Lab Results   Component Value Date    HGBA1C 7.7 (H) 11/14/2024     Most recent fingerstick glucose reviewed-   Recent Labs   Lab 05/06/25  1745 05/06/25 2004 05/07/25  0730 05/07/25  1127   POCTGLUCOSE 143* 151* 116* 138*     Current correctional scale  Low  Maintain anti-hyperglycemic dose as follows-   Antihyperglycemics (From admission, onward)      Start     Stop Route Frequency Ordered    05/06/25 0900  empagliflozin (Jardiance) tablet 10 mg        Question Answer Comment   Does this patient have a diagnosis of heart failure? Yes    Does this patient have type 1 diabetes or diabetic ketoacidosis? No    Does this patient have symptomatic hypotension? No    Is the patient NPO or pending major surgery in next 3 days or less? No        -- Oral Daily 05/05/25 1937    05/06/25 0746  insulin aspart U-100 pen 0-5 Units         -- SubQ Before meals & nightly PRN 05/06/25 0647          Hold Oral hypoglycemics while patient is in the hospital.

## 2025-05-07 NOTE — PROGRESS NOTES
[facility]/Surg  [facility] Medicine  Progress Note    Patient Name: Onesimo Booth  MRN: [id number]  Patient Class: IP- Inpatient   Admission Date: 5/5/2025  Length of Stay: 1 days  Attending Physician: Paz Palm MD  Primary Care Provider: Denys Mckeon III, MD        Subjective     Principal Problem:Acute blood loss anemia        HPI:  69-year-old male with hx of NIDDM, PVD Left BKA, HTN, Left CEA resident of NH on plavix and Eliquis not sure why was sent for abnormal lab and generalize weakness.  He was found on a CBC earlier today to be anemic, with a hemoglobin of 6.5.  This is new for him.  Denies Hematochezia or melena, no endoscopy reported he is accompanied by his daughter at bedside. Pt is Poor historian. Surgery consulted for scope, Pt is getting one unit of PRBC and protonix per ED    Overview/Hospital Course:   05/06/2025 pt admitted with GI bleed.  S/p 1 U PRBCS and hgb at 7.5 stable last several draws.    05/07/2025 pt s/p EGD shows ulver of duodenum,.  Multiple polyps removed, lipoma and diverticuli and hemorrhoids. Pt H&H stable after 1 U and no c/o at present    Past Medical History:   Diagnosis Date    Anticoagulant long-term use     Anxiety disorder, unspecified     Aphasia     CVA (cerebral vascular accident)     Depression     Diabetes mellitus     GERD (gastroesophageal reflux disease)     Hemiplegia     HTN (hypertension)     Mixed hyperlipidemia     Stroke     Unspecified atrial flutter        Past Surgical History:   Procedure Laterality Date    AMPUTATION, LOWER LIMB Left     BKA    CAROTID ENDARTERECTOMY Left     FRACTURE SURGERY      VASCULAR SURGERY         Review of patient's allergies indicates:  No Known Allergies    No current facility-administered medications on file prior to encounter.     Current Outpatient Medications on File Prior to Encounter   Medication Sig    amLODIPine (NORVASC) 2.5 MG tablet Take 10 mg by mouth once daily.    apixaban  (ELIQUIS) 5 mg Tab Take 5 mg by mouth 2 (two) times daily.    clopidogreL (PLAVIX) 75 mg tablet Take 75 mg by mouth once daily.    empagliflozin (JARDIANCE) 10 mg tablet Take 10 mg by mouth once daily.    ergocalciferol (VITAMIN D2) 50,000 unit Cap Take 50,000 Units by mouth every Tues, Fri.    famotidine (PEPCID) 20 MG tablet Take 20 mg by mouth 2 (two) times daily.    fenofibrate 160 MG Tab Take 160 mg by mouth nightly.    folic acid (FOLVITE) 1 MG tablet Take 1 mg by mouth once daily.    furosemide (LASIX) 40 MG tablet Take 40 mg by mouth once daily.    gabapentin (NEURONTIN) 300 MG capsule Take 300 mg by mouth 3 (three) times daily.    hydrALAZINE (APRESOLINE) 25 MG tablet Take 25 mg by mouth 2 (two) times daily.    HYDROcodone-acetaminophen (NORCO) 5-325 mg per tablet Take 1 tablet by mouth every 6 (six) hours as needed for Pain.    lisinopriL (PRINIVIL,ZESTRIL) 40 MG tablet Take 40 mg by mouth once daily.    metFORMIN (GLUCOPHAGE) 1000 MG tablet Take 1,000 mg by mouth 2 (two) times daily with meals.    metoprolol tartrate (LOPRESSOR) 25 MG tablet Take 12.5 mg by mouth 2 (two) times daily.    rosuvastatin (CRESTOR) 20 MG tablet Take 20 mg by mouth every evening.    sertraline (ZOLOFT) 100 MG tablet Take 100 mg by mouth once daily.    traZODone (DESYREL) 50 MG tablet Take 50 mg by mouth every evening.    busPIRone (BUSPAR) 5 MG Tab Take 5 mg by mouth 2 (two) times daily.     Family History    None       Tobacco Use    Smoking status: Former     Types: Cigarettes    Smokeless tobacco: Not on file   Substance and Sexual Activity    Alcohol use: Not Currently    Drug use: Not Currently    Sexual activity: Not on file     Review of Systems   Constitutional:  Positive for fatigue.   HENT: Negative.     Eyes: Negative.    Respiratory: Negative.     Cardiovascular: Negative.    Gastrointestinal: Negative.    Endocrine: Negative.    Genitourinary: Negative.    Musculoskeletal: Negative.    Skin: Negative.   "  Allergic/Immunologic: Negative.    Neurological: Negative.    Hematological: Negative.    Psychiatric/Behavioral: Negative.       Objective:     Vital Signs (Most Recent):  Temp: 97.8 °F (36.6 °C) (05/07/25 1155)  Pulse: 76 (05/07/25 1155)  Resp: 18 (05/07/25 1155)  BP: (!) 98/58 (05/07/25 1155)  SpO2: 98 % (05/07/25 1155) Vital Signs (24h Range):  Temp:  [97.6 °F (36.4 °C)-98.6 °F (37 °C)] 97.8 °F (36.6 °C)  Pulse:  [] 76  Resp:  [16-22] 18  SpO2:  [94 %-100 %] 98 %  BP: ()/(47-67) 98/58     Weight: 124.6 kg (274 lb 12.8 oz)  Body mass index is 37.27 kg/m².     Physical Exam  Constitutional:       Appearance: Normal appearance. He is obese.   HENT:      Mouth/Throat:      Mouth: Mucous membranes are moist.   Eyes:      Extraocular Movements: Extraocular movements intact.      Conjunctiva/sclera: Conjunctivae normal.      Pupils: Pupils are equal, round, and reactive to light.   Cardiovascular:      Rate and Rhythm: Normal rate and regular rhythm.      Pulses: Normal pulses.      Heart sounds: Normal heart sounds.   Pulmonary:      Effort: Pulmonary effort is normal.      Breath sounds: Normal breath sounds.   Abdominal:      General: Abdomen is flat. Bowel sounds are normal.      Palpations: Abdomen is soft.   Musculoskeletal:      Cervical back: Normal range of motion and neck supple.      Comments: Left BKA with dressing on left stump    Neurological:      General: No focal deficit present.      Mental Status: He is alert and oriented to person, place, and time.   Psychiatric:         Mood and Affect: Mood normal.              CRANIAL NERVES     CN III, IV, VI   Pupils are equal, round, and reactive to light.       Significant Labs: All pertinent labs within the past 24 hours have been reviewed.  A1C:   Recent Labs   Lab 11/14/24  1115   HGBA1C 7.7*     ABGs: No results for input(s): "PH", "PCO2", "HCO3", "POCSATURATED", "BE", "TOTALHB", "COHB", "METHB", "O2HB", "POCFIO2", "PO2" in the last 48 " "hours.  Bilirubin:   Recent Labs   Lab 25  1809   BILITOT 0.4     Blood Culture: No results for input(s): "LABBLOO" in the last 48 hours.  BMP:   Recent Labs   Lab 25  0739         K 4.2      CO2 33*   BUN 24*   CREATININE 0.79   CALCIUM 8.1*     CBC:   Recent Labs   Lab 25  1615 25  1809 25  0450 25  0805 25  1607 25  0825   WBC 8.47  --  6.82  --   --  5.85   HGB 6.5*   < > 7.5* 7.5* 7.4* 7.9*   HCT 25.1*   < > 26.5* 26.6* 26.0* 28.1*     --  124*  --   --  129*    < > = values in this interval not displayed.     CMP:   Recent Labs   Lab 25  1809 25  0450 25  0739    139 139   K 4.4 4.2 4.2    106 104   CO2 29 31 33*   * 115 111   BUN 34* 31* 24*   CREATININE 0.86 0.92 0.79   CALCIUM 8.3* 8.0* 8.1*   PROT 6.9  --   --    ALBUMIN 4.0  --   --    BILITOT 0.4  --   --    ALKPHOS 61  --   --    AST 27  --   --    ALT 24  --   --      Cardiac Markers: No results for input(s): "CKMB", "MYOGLOBIN", "BNP", "TROPISTAT" in the last 48 hours.  Coagulation: No results for input(s): "PT", "INR", "APTT" in the last 48 hours.  Lactic Acid: No results for input(s): "LACTATE" in the last 48 hours.  Lipase: No results for input(s): "LIPASE" in the last 48 hours.  Lipid Panel: No results for input(s): "CHOL", "HDL", "LDLCALC", "TRIG", "CHOLHDL" in the last 48 hours.  Magnesium: No results for input(s): "MG" in the last 48 hours.  Pathology Results  (Last 10 years)                 23 1320  Specimen to Pathology Final result    Narrative:  Patient - KEEGAN SMITH                -  Sex- M   Med Rec # - [id number]                        [place] Rosen   The following is an electronic copy of report # [id number] from:    [facility]   [address] [place] [id number]                          Phone [phone]   DIAGNOSIS:   2023      RBW:liseth   BLOOD " "VESSEL, LEFT CAROTID, ENDARTERECTOMY:   - PARTIALLY CALCIFIED ATHEROMATOUS PLAQUE.     _______________________________________________________________________   SPECIMEN AND SOURCE:   LEFT CAROTID PLAQUE   CLINICAL INFORMATION:   OCCLUSION AND STENOSIS OF LEFT CAROTID ARTERY   GROSS EXAMINATION:   03/09/2023  WO/WPL   Received in formalin, labeled with the patient's name, "Onesimo Booth," medical record number, "left carotid plaque," is a firm portion    of tubular white-tan tissue, 5.4 cm in length and 0.6 to 1 cm in    diameter.  The lining is smooth, white-tan with focal firm white areas    and pale yellow calcifications within the wall.  Representative sections    submitted in 1A.     MICROSCOPIC DESCRIPTION:   Unless gross only is specified, the final diagnosis for each specimen is    based on a microscopic examination of representative sections of tissue    by digitally imaged slide(s), to include immunohistochemical and special    stains if performed.    CODE: 0   Pathologist: DEMAR Elizondo MD, FCAP (Electronic Signature)    03/13/2023 4:44 PM    [facility] * [address] *    [address]    Technical services performed at: [facility], [facility] * [address] [place] [address] * [place] [address]    Gross examination performed at: [facility] * [address] [place] [address] * [place] [address]              POCT Glucose:   Recent Labs   Lab 05/06/25  2004 05/07/25  0730 05/07/25  1127   POCTGLUCOSE 151* 116* 138*     Prealbumin: No results for input(s): "PREALBUMIN" in the last 48 hours.  Respiratory Culture: No results for input(s): "GSRESP", "RESPIRATORYC" in the last 48 hours.  Troponin: No results for input(s): "TROPONINI", "TROPONINIHS" in the last 48 hours.  TSH: No results for input(s): "TSH" in the last 4320 hours.  Urine Culture: No results for input(s): "LABURIN" in the last 48 hours.  Urine Studies: No results for " "input(s): "COLORU", "APPEARANCEUA", "PHUR", "SPECGRAV", "PROTEINUA", "GLUCUA", "KETONESU", "BILIRUBINUA", "OCCULTUA", "NITRITE", "UROBILINOGEN", "LEUKOCYTESUR", "RBCUA", "WBCUA", "BACTERIA", "SQUAMEPITHEL", "HYALINECASTS" in the last 48 hours.    Invalid input(s): "WRIGHTSUR"    Significant Imaging:       Assessment & Plan  Acute anemia    S/p 1 U PRBC 05/05/2025  Monitor H&H  Sutgeryfollowing  Coagulopathy  Continue to hold plavix and asa  S//p EGD/Colonoscopy    Obesity  Body mass index is 37.27 kg/m². Morbid obesity complicates all aspects of disease management from diagnostic modalities to treatment. Weight loss encouraged and health benefits explained to patient.       Thrombocytopenia  The likely etiology of thrombocytopenia is unknown. The patients 3 most recent labs are listed below.  Recent Labs     05/05/25  1615 05/06/25  0450 05/07/25  0825    124* 129*     Plan  - Will transfuse if platelet count is <50k (if undergoing surgical procedure or have active bleeding).  -      GI bleed  Patient's hemorrhage is due to gastrointestinal bleed, this bleeding is associated with an anticoagulant, the anticoagulant is Select Anticoagulant(s): plavix, asa. Patients most recent Hgb, Hct, platelets, and INR are listed below.  Recent Labs     05/05/25  1615 05/05/25  1809 05/06/25  0450 05/06/25  0805 05/06/25  1607 05/07/25  0825   HGB 6.5*   < > 7.5* 7.5* 7.4* 7.9*   HCT 25.1*   < > 26.5* 26.6* 26.0* 28.1*     --  124*  --   --  129*    < > = values in this interval not displayed.     Plan  - Will trend hemoglobin/hematocrit Every 6 hours  - Will monitor and correct any coagulation defects  - Will transfuse if Hgb is <7g/dl (<8g/dl in cases of active ACS) or if patient has rapid bleeding leading to hemodynamic instability  -  s/p 1 U PRBC  S/p egd and colonosocopy today  Acute blood loss anemia  Anemia is likely due to acute blood loss which was from GI and Iron deficiency. Most recent hemoglobin and " hematocrit are listed below.  Recent Labs     05/06/25  0805 05/06/25  1607 05/07/25  0825   HGB 7.5* 7.4* 7.9*   HCT 26.6* 26.0* 28.1*     Plan  - Monitor serial CBC: Every 6 hours  - Transfuse PRBC if patient becomes hemodynamically unstable, symptomatic or H/H drops below 7/21.  - Patient has received 1 units of PRBCs on 05/05/2025  - Patient's anemia is currently stable  -    Controlled type 2 diabetes mellitus, without long-term current use of insulin  Patient's FSGs are controlled on current medication regimen.  Last A1c reviewed-   Lab Results   Component Value Date    HGBA1C 7.7 (H) 11/14/2024     Most recent fingerstick glucose reviewed-   Recent Labs   Lab 05/06/25  1745 05/06/25 2004 05/07/25  0730 05/07/25  1127   POCTGLUCOSE 143* 151* 116* 138*     Current correctional scale  Low  Maintain anti-hyperglycemic dose as follows-   Antihyperglycemics (From admission, onward)      Start     Stop Route Frequency Ordered    05/06/25 0900  empagliflozin (Jardiance) tablet 10 mg        Question Answer Comment   Does this patient have a diagnosis of heart failure? Yes    Does this patient have type 1 diabetes or diabetic ketoacidosis? No    Does this patient have symptomatic hypotension? No    Is the patient NPO or pending major surgery in next 3 days or less? No        -- Oral Daily 05/05/25 1937    05/06/25 0746  insulin aspart U-100 pen 0-5 Units         -- SubQ Before meals & nightly PRN 05/06/25 0647          Hold Oral hypoglycemics while patient is in the hospital.      Primary hypertension  Patient's blood pressure range in the last 24 hours was: BP  Min: 81/53  Max: 104/60.The patient's inpatient anti-hypertensive regimen is listed below:  Current Antihypertensives  , Daily, Oral  , 2 times daily, Oral  , 2 times daily, Oral  furosemide tablet 40 mg, 2 times daily, Oral  hydrALAZINE tablet 25 mg, 2 times daily, Oral  lisinopriL tablet 40 mg, Daily, Oral  metoprolol tartrate (LOPRESSOR) split tablet 12.5  mg, 2 times daily, Oral  amLODIPine tablet 5 mg, Daily, Oral    Plan  - BP is controlled, no changes needed to their regimen  -    Duodenal ulcer  Pepcid, protonix careafate  Check h pylori stool    Diverticulosis  On colonoscopy    Lipoma of colon  On colonoscopy  S/p removal    Colon polyps  On colonoscopy  S/p removal  F/u with Dr. Guzman for path    Hemorrhoid  On colonoscopy  F/u with Dr. Guzman post d/c    VTE Risk Mitigation (From admission, onward)           Ordered     IP VTE HIGH RISK PATIENT  Once         05/05/25 1937     Place sequential compression device  Until discontinued         05/05/25 1937     Reason for No Pharmacological VTE Prophylaxis  Once        Question:  Reasons:  Answer:  Active Bleeding    05/05/25 1937                    Discharge Planning   AVELINO: 5/9/2025     Code Status: Full Code   Medical Readiness for Discharge Date:   Discharge Plan A: Return to nursing home                        Paz Palm MD  Department of Hospital Medicine   [facility]-Med/Surg

## 2025-05-07 NOTE — ANESTHESIA PREPROCEDURE EVALUATION
05/07/2025  Onesimo Booth is a 69 y.o., male.    nesthesia History    No specialty history recorded    Other Medical History   Stroke HTN (hypertension)   Mixed hyperlipidemia Hemiplegia   Diabetes mellitus Aphasia   CVA (cerebral vascular accident) Depression   Anxiety disorder, unspecified GERD (gastroesophageal reflux disease)   Anticoagulant long-term use Unspecified atrial flutter     Surgical History    FRACTURE SURGERY VASCULAR SURGERY   CAROTID ENDARTERECTOMY AMPUTATION, LOWER LIMB     COVID-19 Risk of Complications  Current as of 2 hours ago    6 0 to < 3 Points: Low Risk   3 to < 6 Points: Medium Risk   6 to 16 Points: High Risk     Last Change:       Details    This score indicates the number of risk factors that a patient over the age of 18  has for severe illness or mortality from a COVID-19 infection. A lower score means fewer risk factors and is preferred.            Age: 69      Current as of 2 hours ago   Has Congestive Heart Failure: Yes      Current as of 2 hours ago   Has Congenital Heart Disease : No      Current as of 2 hours ago   Has Coronary Artery Disease: No      Current as of 2 hours ago   Has End Stage Renal Disease: No      Current as of 2 hours ago   Has End-Stage Liver Disease: No      Current as of 2 hours ago   Has Chronic Pulmonary Disease: No      Current as of 2 hours ago   Has Diabetes Excluding Gestational Diabetes: Yes      Current as of 2 hours ago   Is Immunocompromised : No      Current as of 2 hours ago   Resides in Nursing Home: No      Current as of 2 hours ago   Pregnant: No      Current as of 2 hours ago   Legal Sex: Male      Current as of 2 hours ago   Has Hypertension: Yes      Current as of 2 hours ago   Has Obesity: Yes      Current as of 2 hours ago       Substance History    Smoking Status: Former   Smokeless Tobacco Status: Unknown   Alcohol use:  Not Currently   Drug use: Not Currently     Anesthesia Family History    No family medical history recorded     Current Gender Identity    Questions Responses   Patient's Gender Identity: Male   Patient's sex assigned at birth: Male        Pre-op Assessment    I have reviewed the Patient Summary Reports.     I have reviewed the Nursing Notes. I have reviewed the NPO Status.   I have reviewed the Medications.     Review of Systems  Anesthesia Hx:  No problems with previous Anesthesia             Denies Family Hx of Anesthesia complications.    Denies Personal Hx of Anesthesia complications.                    Social:  Former Smoker       Hematology/Oncology:    Oncology Normal    -- Anemia:               Hematology Comments: Thrombocytopenia                    EENT/Dental:  EENT/Dental Normal           Cardiovascular:  Exercise tolerance: poor   Hypertension    Dysrhythmias atrial fibrillation                                     Pulmonary:  Pulmonary Normal                       Renal/:  Renal/ Normal                 Hepatic/GI:     GERD                Musculoskeletal:  Musculoskeletal Normal                Neurological:   CVA, residual symptoms        Aphasia                            Endocrine:  Diabetes, type 2           Dermatological:  Skin Normal    Psych:  Psychiatric History anxiety depression                Physical Exam  General: Well nourished, Cooperative, Alert and Oriented    Airway:  Mallampati: II / II  Mouth Opening: Normal  TM Distance: Normal  Tongue: Normal  Neck ROM: Normal ROM    Dental:  Loose teeth  Extremely poor dentition      Anesthesia Plan  Type of Anesthesia, risks & benefits discussed:    Anesthesia Type: MAC  Intra-op Monitoring Plan: Standard ASA Monitors  Post Op Pain Control Plan: multimodal analgesia  Induction:  IV  Airway Plan: Direct  Informed Consent: Informed consent signed with the Patient and all parties understand the risks and agree with anesthesia plan.  All  questions answered. Patient consented to blood products? Yes  ASA Score: 3  Day of Surgery Review of History & Physical: H&P Update referred to the surgeon/provider.I have interviewed and examined the patient. I have reviewed the patient's H&P dated: There are no significant changes.     Ready For Surgery From Anesthesia Perspective.     .

## 2025-05-07 NOTE — ANESTHESIA POSTPROCEDURE EVALUATION
Anesthesia Post Evaluation    Patient: Onesimo Booth    Procedure(s) Performed: * No procedures listed *    Final Anesthesia Type: MAC      Patient location during evaluation: floor  Patient participation: Yes- Able to Participate  Level of consciousness: awake and alert and oriented  Post-procedure vital signs: reviewed and stable  Pain management: adequate  Airway patency: patent    PONV status at discharge: No PONV  Anesthetic complications: no      Cardiovascular status: blood pressure returned to baseline and hemodynamically stable  Respiratory status: unassisted, spontaneous ventilation and room air  Hydration status: euvolemic  Follow-up not needed.              Vitals Value Taken Time   BP 87/58 05/07/25 11:25   Temp 36.7 °C (98.1 °F) 05/07/25 11:25   Pulse 86 05/07/25 11:25   Resp 18 05/07/25 11:25   SpO2 99 % 05/07/25 11:25         No case tracking events are documented in the log.      Pain/Chace Score: Pain Rating Prior to Med Admin: 5 (5/7/2025  3:32 AM)  Pain Rating Post Med Admin: 2 (5/7/2025  4:32 AM)

## 2025-05-07 NOTE — DISCHARGE SUMMARY
[facility]-Med/Surg  Discharge Note  Short Stay    * No procedures listed *      OUTCOME: Patient tolerated treatment/procedure well without complication and is now ready for discharge.    DISPOSITION: Home or Self Care    FINAL DIAGNOSIS:  Acute blood loss anemia  Terminal ileum up to 30 cm   Normal cecum with Mid ascending colon polyp removed with a hot loop snare  Submucosal lipoma at the hepatic flexure  Normal transverse and descending colon  Colon polyp at 40 cm removed with the cold biopsy forceps   Colon polyp at 30 cm removed with a hot loop snare   Minimal diverticular disease normal sigmoid colon   Grade 2-3 hemorrhoids with good tone no masses  FOLLOWUP: In clinic 1 week    DISCHARGE INSTRUCTIONS:  No discharge procedures on file.     TIME SPENT ON DISCHARGE:  5 minutes

## 2025-05-07 NOTE — ASSESSMENT & PLAN NOTE
Patient's blood pressure range in the last 24 hours was: BP  Min: 81/53  Max: 104/60.The patient's inpatient anti-hypertensive regimen is listed below:  Current Antihypertensives  , Daily, Oral  , 2 times daily, Oral  , 2 times daily, Oral  furosemide tablet 40 mg, 2 times daily, Oral  hydrALAZINE tablet 25 mg, 2 times daily, Oral  lisinopriL tablet 40 mg, Daily, Oral  metoprolol tartrate (LOPRESSOR) split tablet 12.5 mg, 2 times daily, Oral  amLODIPine tablet 5 mg, Daily, Oral    Plan  - BP is controlled, no changes needed to their regimen  -

## 2025-05-08 LAB
ANION GAP SERPL CALC-SCNC: 0 MEQ/L (ref 2–13)
AORTIC SIZE INDEX: 1.1 CM/M2
AORTIC VALVE CUSP SEPERATION: 1.82 CM
ASCENDING AORTA: 2.7 CM
AV INDEX (PROSTH): 0.63
AV MEAN GRADIENT: 3 MMHG
AV PEAK GRADIENT: 7 MMHG
AV VALVE AREA BY VELOCITY RATIO: 1.9 CM²
AV VALVE AREA: 2 CM²
AV VELOCITY RATIO: 0.62
BASOPHILS # BLD AUTO: 0.04 X10(3)/MCL (ref 0.01–0.08)
BASOPHILS NFR BLD AUTO: 0.6 % (ref 0.1–1.2)
BSA FOR ECHO PROCEDURE: 2.52 M2
BUN SERPL-MCNC: 19 MG/DL (ref 7–20)
CALCIUM SERPL-MCNC: 8 MG/DL (ref 8.4–10.2)
CHLORIDE SERPL-SCNC: 104 MMOL/L (ref 98–110)
CO2 SERPL-SCNC: 34 MMOL/L (ref 21–32)
CREAT SERPL-MCNC: 1.04 MG/DL (ref 0.66–1.25)
CREAT/UREA NIT SERPL: 18 (ref 12–20)
CV ECHO LV RWT: 0.31 CM
DOP CALC AO PEAK VEL: 1.3 M/S
DOP CALC AO VTI: 23.7 CM
DOP CALC LVOT AREA: 3.1 CM2
DOP CALC LVOT DIAMETER: 2 CM
DOP CALC LVOT PEAK VEL: 0.8 M/S
DOP CALC LVOT STROKE VOLUME: 47.1 CM3
DOP CALC MV VTI: 25.2 CM
DOP CALCLVOT PEAK VEL VTI: 15 CM
E WAVE DECELERATION TIME: 247 MSEC
E/E' RATIO: 6 M/S
ECHO LV POSTERIOR WALL: 0.9 CM (ref 0.6–1.1)
EOSINOPHIL # BLD AUTO: 0.05 X10(3)/MCL (ref 0.04–0.54)
EOSINOPHIL NFR BLD AUTO: 0.7 % (ref 0.7–7)
ERYTHROCYTE [DISTWIDTH] IN BLOOD BY AUTOMATED COUNT: 16.8 %
FRACTIONAL SHORTENING: 31 % (ref 28–44)
GFR SERPLBLD CREATININE-BSD FMLA CKD-EPI: 78 ML/MIN/1.73/M2
GLUCOSE SERPL-MCNC: 154 MG/DL (ref 70–115)
HCT VFR BLD AUTO: 27.3 % (ref 36–52)
HGB BLD-MCNC: 7.7 G/DL (ref 13–18)
IMM GRANULOCYTES # BLD AUTO: 0.27 X10(3)/MCL (ref 0–0.03)
IMM GRANULOCYTES NFR BLD AUTO: 4 % (ref 0–0.5)
INFERIOR VENA CAVA SIZE SNIFF: 0.7 CM
INTERVENTRICULAR SEPTUM: 1 CM (ref 0.6–1.1)
IVC DIAMETER: 1.7 CM
LEFT ATRIUM SIZE: 3.9 CM
LEFT ATRIUM VOLUME INDEX MOD: 32 ML/M2
LEFT ATRIUM VOLUME MOD: 77 ML
LEFT INTERNAL DIMENSION IN SYSTOLE: 4 CM (ref 2.1–4)
LEFT VENTRICLE DIASTOLIC VOLUME INDEX: 66.8 ML/M2
LEFT VENTRICLE DIASTOLIC VOLUME: 163 ML
LEFT VENTRICLE END SYSTOLIC VOLUME APICAL 2 CHAMBER: 88.4 ML
LEFT VENTRICLE END SYSTOLIC VOLUME APICAL 4 CHAMBER: 61.8 ML
LEFT VENTRICLE MASS INDEX: 89.4 G/M2
LEFT VENTRICLE SYSTOLIC VOLUME INDEX: 28.7 ML/M2
LEFT VENTRICLE SYSTOLIC VOLUME: 70 ML
LEFT VENTRICULAR INTERNAL DIMENSION IN DIASTOLE: 5.8 CM (ref 3.5–6)
LEFT VENTRICULAR MASS: 218.1 G
LV LATERAL E/E' RATIO: 5.8 M/S
LV SEPTAL E/E' RATIO: 6.5 M/S
LVED V (TEICH): 163.3 ML
LVES V (TEICH): 70.4 ML
LVOT MG: 1.1 MMHG
LVOT MV: 0.46 CM/S
LYMPHOCYTES # BLD AUTO: 0.78 X10(3)/MCL (ref 1.32–3.57)
LYMPHOCYTES NFR BLD AUTO: 11.6 % (ref 20–55)
MCH RBC QN AUTO: 22.4 PG (ref 27–34)
MCHC RBC AUTO-ENTMCNC: 28.2 G/DL (ref 31–37)
MCV RBC AUTO: 79.6 FL (ref 79–99)
MONOCYTES # BLD AUTO: 0.75 X10(3)/MCL (ref 0.3–0.82)
MONOCYTES NFR BLD AUTO: 11.1 % (ref 4.7–12.5)
MV MEAN GRADIENT: 2 MMHG
MV PEAK E VEL: 0.98 M/S
MV PEAK GRADIENT: 7 MMHG
MV STENOSIS PRESSURE HALF TIME: 80 MS
MV VALVE AREA BY CONTINUITY EQUATION: 1.87 CM2
MV VALVE AREA P 1/2 METHOD: 2.75 CM2
NEUTROPHILS # BLD AUTO: 4.85 X10(3)/MCL (ref 1.78–5.38)
NEUTROPHILS NFR BLD AUTO: 72 % (ref 37–73)
NRBC BLD AUTO-RTO: 0.9 %
PISA TR MAX VEL: 1.4 M/S
PLATELET # BLD AUTO: 131 X10(3)/MCL (ref 140–371)
PMV BLD AUTO: 10.8 FL (ref 9.4–12.4)
POCT GLUCOSE: 161 MG/DL (ref 70–110)
POCT GLUCOSE: 168 MG/DL (ref 70–110)
POCT GLUCOSE: 204 MG/DL (ref 70–110)
POCT GLUCOSE: 238 MG/DL (ref 70–110)
POTASSIUM SERPL-SCNC: 3.8 MMOL/L (ref 3.5–5.1)
RA PRESSURE ESTIMATED: 8 MMHG
RBC # BLD AUTO: 3.43 X10(6)/MCL (ref 4–6)
RV TB RVSP: 9 MMHG
SODIUM SERPL-SCNC: 138 MMOL/L (ref 136–145)
TDI LATERAL: 0.17 M/S
TDI SEPTAL: 0.15 M/S
TDI: 0.16 M/S
TR MAX PG: 8 MMHG
TRICUSPID ANNULAR PLANE SYSTOLIC EXCURSION: 1.1 CM
TV REST PULMONARY ARTERY PRESSURE: 16 MMHG
WBC # BLD AUTO: 6.74 X10(3)/MCL (ref 4–11.5)
Z-SCORE OF LEFT VENTRICULAR DIMENSION IN END DIASTOLE: -7.26
Z-SCORE OF LEFT VENTRICULAR DIMENSION IN END SYSTOLE: -4.59

## 2025-05-08 PROCEDURE — 27000221 HC OXYGEN, UP TO 24 HOURS

## 2025-05-08 PROCEDURE — 25000003 PHARM REV CODE 250: Performed by: FAMILY MEDICINE

## 2025-05-08 PROCEDURE — 25000003 PHARM REV CODE 250

## 2025-05-08 PROCEDURE — 11000001 HC ACUTE MED/SURG PRIVATE ROOM

## 2025-05-08 PROCEDURE — 25000003 PHARM REV CODE 250: Performed by: INTERNAL MEDICINE

## 2025-05-08 PROCEDURE — 94761 N-INVAS EAR/PLS OXIMETRY MLT: CPT

## 2025-05-08 PROCEDURE — 25500020 PHARM REV CODE 255: Performed by: FAMILY MEDICINE

## 2025-05-08 PROCEDURE — 85025 COMPLETE CBC W/AUTO DIFF WBC: CPT | Performed by: FAMILY MEDICINE

## 2025-05-08 PROCEDURE — 36415 COLL VENOUS BLD VENIPUNCTURE: CPT | Performed by: FAMILY MEDICINE

## 2025-05-08 PROCEDURE — 63600175 PHARM REV CODE 636 W HCPCS: Performed by: FAMILY MEDICINE

## 2025-05-08 PROCEDURE — 99900035 HC TECH TIME PER 15 MIN (STAT)

## 2025-05-08 PROCEDURE — 80048 BASIC METABOLIC PNL TOTAL CA: CPT | Performed by: FAMILY MEDICINE

## 2025-05-08 RX ORDER — FUROSEMIDE 10 MG/ML
40 INJECTION INTRAMUSCULAR; INTRAVENOUS ONCE
Status: COMPLETED | OUTPATIENT
Start: 2025-05-08 | End: 2025-05-08

## 2025-05-08 RX ADMIN — FAMOTIDINE 20 MG: 20 TABLET, FILM COATED ORAL at 09:05

## 2025-05-08 RX ADMIN — FOLIC ACID 1 MG: 1 TABLET ORAL at 09:05

## 2025-05-08 RX ADMIN — HYDROCODONE BITARTRATE AND ACETAMINOPHEN 1 TABLET: 5; 325 TABLET ORAL at 03:05

## 2025-05-08 RX ADMIN — PERFLUTREN 2 ML: 6.52 INJECTION, SUSPENSION INTRAVENOUS at 02:05

## 2025-05-08 RX ADMIN — SUCRALFATE 1 G: 1 TABLET ORAL at 06:05

## 2025-05-08 RX ADMIN — PANTOPRAZOLE SODIUM 40 MG: 40 TABLET, DELAYED RELEASE ORAL at 09:05

## 2025-05-08 RX ADMIN — TRAZODONE HYDROCHLORIDE 50 MG: 50 TABLET ORAL at 08:05

## 2025-05-08 RX ADMIN — SERTRALINE HYDROCHLORIDE 100 MG: 50 TABLET ORAL at 09:05

## 2025-05-08 RX ADMIN — BUSPIRONE HYDROCHLORIDE 5 MG: 5 TABLET ORAL at 08:05

## 2025-05-08 RX ADMIN — INSULIN ASPART 2 UNITS: 100 INJECTION, SOLUTION INTRAVENOUS; SUBCUTANEOUS at 10:05

## 2025-05-08 RX ADMIN — FAMOTIDINE 20 MG: 20 TABLET, FILM COATED ORAL at 08:05

## 2025-05-08 RX ADMIN — HYDROCODONE BITARTRATE AND ACETAMINOPHEN 1 TABLET: 5; 325 TABLET ORAL at 09:05

## 2025-05-08 RX ADMIN — SUCRALFATE 1 G: 1 TABLET ORAL at 03:05

## 2025-05-08 RX ADMIN — MICONAZOLE NITRATE 2 % TOPICAL POWDER: at 09:05

## 2025-05-08 RX ADMIN — METOPROLOL TARTRATE 12.5 MG: 25 TABLET, FILM COATED ORAL at 08:05

## 2025-05-08 RX ADMIN — SODIUM CHLORIDE 125 MG: 9 INJECTION, SOLUTION INTRAVENOUS at 09:05

## 2025-05-08 RX ADMIN — BUSPIRONE HYDROCHLORIDE 5 MG: 5 TABLET ORAL at 09:05

## 2025-05-08 RX ADMIN — GABAPENTIN 300 MG: 300 CAPSULE ORAL at 09:05

## 2025-05-08 RX ADMIN — FUROSEMIDE 40 MG: 10 INJECTION, SOLUTION INTRAMUSCULAR; INTRAVENOUS at 09:05

## 2025-05-08 RX ADMIN — HYDRALAZINE HYDROCHLORIDE 25 MG: 25 TABLET ORAL at 09:05

## 2025-05-08 RX ADMIN — SUCRALFATE 1 G: 1 TABLET ORAL at 10:05

## 2025-05-08 RX ADMIN — EMPAGLIFLOZIN 10 MG: 10 TABLET, FILM COATED ORAL at 09:05

## 2025-05-08 RX ADMIN — SUCRALFATE 1 G: 1 TABLET ORAL at 08:05

## 2025-05-08 RX ADMIN — INSULIN ASPART 2 UNITS: 100 INJECTION, SOLUTION INTRAVENOUS; SUBCUTANEOUS at 04:05

## 2025-05-08 RX ADMIN — GABAPENTIN 300 MG: 300 CAPSULE ORAL at 08:05

## 2025-05-08 RX ADMIN — FENOFIBRATE 145 MG: 145 TABLET ORAL at 09:05

## 2025-05-08 RX ADMIN — METOPROLOL TARTRATE 12.5 MG: 25 TABLET, FILM COATED ORAL at 09:05

## 2025-05-08 RX ADMIN — AMLODIPINE BESYLATE 5 MG: 5 TABLET ORAL at 09:05

## 2025-05-08 RX ADMIN — ATORVASTATIN CALCIUM 80 MG: 40 TABLET, FILM COATED ORAL at 09:05

## 2025-05-08 RX ADMIN — GABAPENTIN 300 MG: 300 CAPSULE ORAL at 03:05

## 2025-05-08 RX ADMIN — LISINOPRIL 40 MG: 40 TABLET ORAL at 09:05

## 2025-05-08 RX ADMIN — MICONAZOLE NITRATE 2 % TOPICAL POWDER: at 08:05

## 2025-05-08 NOTE — ASSESSMENT & PLAN NOTE
Anemia is likely due to acute blood loss which was from GI and Iron deficiency. Most recent hemoglobin and hematocrit are listed below.  Recent Labs     05/06/25  1607 05/07/25  0825 05/08/25  0457   HGB 7.4* 7.9* 7.7*   HCT 26.0* 28.1* 27.3*     Plan  - Monitor serial CBC: Every 6 hours  - Transfuse PRBC if patient becomes hemodynamically unstable, symptomatic or H/H drops below 7/21.  - Patient has received 1 units of PRBCs on 05/05/2025  - Patient's anemia is currently stable  -

## 2025-05-08 NOTE — PROGRESS NOTES
[facility]/Surg  [facility]  Progress Note    Patient Name: Onesimo Booth  MRN: [id number]  Patient Class: IP- Inpatient   Admission Date: 5/5/2025  Length of Stay: 2 days  Attending Physician: Paz Palm MD  Primary Care Provider: Denys Mckeon III, MD        Subjective     Principal Problem:Acute blood loss anemia        HPI:  69-year-old male with hx of NIDDM, PVD Left BKA, HTN, Left [facility] resident of NH on plavix and Eliquis not sure why was sent for abnormal lab and generalize weakness.  He was found on a CBC earlier today to be anemic, with a hemoglobin of 6.5.  This is new for him.  Denies Hematochezia or melena, no endoscopy reported he is accompanied by his daughter at bedside. Pt is Poor historian. Surgery consulted for scope, Pt is getting one unit of PRBC and protonix per ED    Overview/Hospital Course:   05/06/2025 pt admitted with GI bleed.  S/p 1 U PRBCS and hgb at 7.5 stable last several draws.    05/07/2025 pt s/p EGD shows ulver of duodenum,.  Multiple polyps removed, lipoma and diverticuli and hemorrhoids. Pt H&H stable after 1 U and no c/o at present  05/08/2025 pt with GI bleed, some SOB not resolved with transfusion, still requires oxygen sats 89-90% on RA at rest.  Has not been on oxygen in the past, last echo [facility] 06/2024 EF was normal.  Pt denies SOB and denies chest pain, no cough or fever or WBC  CXR this am mild radha pulmonary edema, chronic RLE and RUE edema iproved since admit.    Will get echo and continue to wean oxygen as tolerated.  May need prn oxygen at the nursing home.    Past Medical History:   Diagnosis Date    Anticoagulant long-term use     Anxiety disorder, unspecified     Aphasia     CVA (cerebral vascular accident)     Depression     Diabetes mellitus     GERD (gastroesophageal reflux disease)     Hemiplegia     HTN (hypertension)     Mixed hyperlipidemia     Stroke     Unspecified atrial flutter        Past Surgical History:    Procedure Laterality Date    AMPUTATION, LOWER LIMB Left     BKA    CAROTID ENDARTERECTOMY Left     FRACTURE SURGERY      VASCULAR SURGERY         Review of patient's allergies indicates:  No Known Allergies    No current facility-administered medications on file prior to encounter.     Current Outpatient Medications on File Prior to Encounter   Medication Sig    amLODIPine (NORVASC) 2.5 MG tablet Take 10 mg by mouth once daily.    apixaban (ELIQUIS) 5 mg Tab Take 5 mg by mouth 2 (two) times daily.    clopidogreL (PLAVIX) 75 mg tablet Take 75 mg by mouth once daily.    empagliflozin (JARDIANCE) 10 mg tablet Take 10 mg by mouth once daily.    ergocalciferol (VITAMIN D2) 50,000 unit Cap Take 50,000 Units by mouth every Tues, Fri.    famotidine (PEPCID) 20 MG tablet Take 20 mg by mouth 2 (two) times daily.    fenofibrate 160 MG Tab Take 160 mg by mouth nightly.    folic acid (FOLVITE) 1 MG tablet Take 1 mg by mouth once daily.    furosemide (LASIX) 40 MG tablet Take 40 mg by mouth once daily.    gabapentin (NEURONTIN) 300 MG capsule Take 300 mg by mouth 3 (three) times daily.    hydrALAZINE (APRESOLINE) 25 MG tablet Take 25 mg by mouth 2 (two) times daily.    HYDROcodone-acetaminophen (NORCO) 5-325 mg per tablet Take 1 tablet by mouth every 6 (six) hours as needed for Pain.    lisinopriL (PRINIVIL,ZESTRIL) 40 MG tablet Take 40 mg by mouth once daily.    metFORMIN (GLUCOPHAGE) 1000 MG tablet Take 1,000 mg by mouth 2 (two) times daily with meals.    metoprolol tartrate (LOPRESSOR) 25 MG tablet Take 12.5 mg by mouth 2 (two) times daily.    rosuvastatin (CRESTOR) 20 MG tablet Take 20 mg by mouth every evening.    sertraline (ZOLOFT) 100 MG tablet Take 100 mg by mouth once daily.    traZODone (DESYREL) 50 MG tablet Take 50 mg by mouth every evening.    busPIRone (BUSPAR) 5 MG Tab Take 5 mg by mouth 2 (two) times daily.     Family History    None       Tobacco Use    Smoking status: Former     Types: Cigarettes     Smokeless tobacco: Not on file   Substance and Sexual Activity    Alcohol use: Not Currently    Drug use: Not Currently    Sexual activity: Not on file     Review of Systems   Constitutional:  Positive for fatigue.   HENT: Negative.     Eyes: Negative.    Respiratory: Negative.     Cardiovascular: Negative.    Gastrointestinal: Negative.    Endocrine: Negative.    Genitourinary: Negative.    Musculoskeletal: Negative.    Skin: Negative.    Allergic/Immunologic: Negative.    Neurological: Negative.    Hematological: Negative.    Psychiatric/Behavioral: Negative.       Objective:     Vital Signs (Most Recent):  Temp: 98.8 °F (37.1 °C) (05/08/25 0715)  Pulse: 102 (05/08/25 0945)  Resp: 18 (05/08/25 0950)  BP: 119/62 (05/08/25 0945)  SpO2: 97 % (05/08/25 0746) Vital Signs (24h Range):  Temp:  [97.6 °F (36.4 °C)-99.1 °F (37.3 °C)] 98.8 °F (37.1 °C)  Pulse:  [] 102  Resp:  [16-18] 18  SpO2:  [95 %-100 %] 97 %  BP: ()/(48-62) 119/62     Weight: 124.6 kg (274 lb 12.8 oz)  Body mass index is 37.27 kg/m².     Physical Exam  Constitutional:       Appearance: Normal appearance. He is obese.   HENT:      Mouth/Throat:      Mouth: Mucous membranes are moist.   Eyes:      Extraocular Movements: Extraocular movements intact.      Conjunctiva/sclera: Conjunctivae normal.      Pupils: Pupils are equal, round, and reactive to light.   Cardiovascular:      Rate and Rhythm: Normal rate and regular rhythm.      Pulses: Normal pulses.      Heart sounds: Normal heart sounds.   Pulmonary:      Effort: Pulmonary effort is normal.      Breath sounds: Normal breath sounds.   Abdominal:      General: Abdomen is flat. Bowel sounds are normal.      Palpations: Abdomen is soft.   Musculoskeletal:      Cervical back: Normal range of motion and neck supple.      Comments: Left BKA with dressing on left stump    Neurological:      General: No focal deficit present.      Mental Status: He is alert and oriented to person, place, and time.  "  Psychiatric:         Mood and Affect: Mood normal.              CRANIAL NERVES     CN III, IV, VI   Pupils are equal, round, and reactive to light.       Significant Labs: All pertinent labs within the past 24 hours have been reviewed.  A1C:   Recent Labs   Lab 24  1115   HGBA1C 7.7*     ABGs: No results for input(s): "PH", "PCO2", "HCO3", "POCSATURATED", "BE", "TOTALHB", "COHB", "METHB", "O2HB", "POCFIO2", "PO2" in the last 48 hours.  Bilirubin:   Recent Labs   Lab 25  1809   BILITOT 0.4     Blood Culture: No results for input(s): "LABBLOO" in the last 48 hours.  BMP:   Recent Labs   Lab 25  0457   *      K 3.8      CO2 34*   BUN 19   CREATININE 1.04   CALCIUM 8.0*     CBC:   Recent Labs   Lab 25  1607 25  0825 25  0457   WBC  --  5.85 6.74   HGB 7.4* 7.9* 7.7*   HCT 26.0* 28.1* 27.3*   PLT  --  129* 131*     CMP:   Recent Labs   Lab 25  0739 25  0457    138   K 4.2 3.8    104   CO2 33* 34*    154*   BUN 24* 19   CREATININE 0.79 1.04   CALCIUM 8.1* 8.0*     Cardiac Markers: No results for input(s): "CKMB", "MYOGLOBIN", "BNP", "TROPISTAT" in the last 48 hours.  Coagulation: No results for input(s): "PT", "INR", "APTT" in the last 48 hours.  Lactic Acid: No results for input(s): "LACTATE" in the last 48 hours.  Lipase: No results for input(s): "LIPASE" in the last 48 hours.  Lipid Panel: No results for input(s): "CHOL", "HDL", "LDLCALC", "TRIG", "CHOLHDL" in the last 48 hours.  Magnesium: No results for input(s): "MG" in the last 48 hours.  Pathology Results  (Last 10 years)                 03/09/23 1320  Specimen to Pathology Final result    Narrative:  Patient - KEEGAN SMITH                -  Sex- M   Med Rec # - [id number]                        [place]   The following is an electronic copy of report # [id number] from:    [facility]   [address] [place] [address]                      " "[place] [address]                          Phone [phone]   DIAGNOSIS:   03/13/2023      RBW:liseth   BLOOD VESSEL, LEFT CAROTID, ENDARTERECTOMY:   - PARTIALLY CALCIFIED ATHEROMATOUS PLAQUE.     _______________________________________________________________________   SPECIMEN AND SOURCE:   LEFT CAROTID PLAQUE   CLINICAL INFORMATION:   OCCLUSION AND STENOSIS OF LEFT CAROTID ARTERY   GROSS EXAMINATION:   03/09/2023  WO/WPL   Received in formalin, labeled with the patient's name, "Onesimo Booth," medical record number, "left carotid plaque," is a firm portion    of tubular white-tan tissue, 5.4 cm in length and 0.6 to 1 cm in    diameter.  The lining is smooth, white-tan with focal firm white areas    and pale yellow calcifications within the wall.  Representative sections    submitted in 1A.     MICROSCOPIC DESCRIPTION:   Unless gross only is specified, the final diagnosis for each specimen is    based on a microscopic examination of representative sections of tissue    by digitally imaged slide(s), to include immunohistochemical and special    stains if performed.    CODE: 0   Pathologist: DEMAR Elizondo MD, FCAP (Electronic Signature)    03/13/2023 4:44 PM    [facility] * [id number] *    [place] [id number]    Technical services performed at: [facility] * [address] [place] [address] * [place] [address]    Gross examination performed at: [facility] * [address] [place] [address] * [place] [phone]              POCT Glucose:   Recent Labs   Lab 05/07/25  1617 05/07/25 2016 05/08/25  0700   POCTGLUCOSE 140* 149* 161*     Prealbumin: No results for input(s): "PREALBUMIN" in the last 48 hours.  Respiratory Culture: No results for input(s): "GSRESP", "RESPIRATORYC" in the last 48 hours.  Troponin: No results for input(s): "TROPONINI", "TROPONINIHS" in the last 48 hours.  TSH: No results for input(s): "TSH" in the last 4320 " "hours.  Urine Culture: No results for input(s): "LABURIN" in the last 48 hours.  Urine Studies: No results for input(s): "COLORU", "APPEARANCEUA", "PHUR", "SPECGRAV", "PROTEINUA", "GLUCUA", "KETONESU", "BILIRUBINUA", "OCCULTUA", "NITRITE", "UROBILINOGEN", "LEUKOCYTESUR", "RBCUA", "WBCUA", "BACTERIA", "SQUAMEPITHEL", "HYALINECASTS" in the last 48 hours.    Invalid input(s): "WRIGHTSUR"    Significant Imaging:       Assessment & Plan  Acute anemia    S/p 1 U PRBC 05/05/2025  Monitor H&H  Sutgeryfollowing  Ferrlicit x 3 doses  H&H has been stable    Coagulopathy  Continue to hold plavix and asa  S//p EGD/Colonoscopy    Obesity  Body mass index is 37.27 kg/m². Morbid obesity complicates all aspects of disease management from diagnostic modalities to treatment. Weight loss encouraged and health benefits explained to patient.       Thrombocytopenia  The likely etiology of thrombocytopenia is unknown. The patients 3 most recent labs are listed below.  Recent Labs     05/06/25  0450 05/07/25  0825 05/08/25  0457   * 129* 131*     Plan  - Will transfuse if platelet count is <50k (if undergoing surgical procedure or have active bleeding).  -      GI bleed  Patient's hemorrhage is due to gastrointestinal bleed, this bleeding is associated with an anticoagulant, the anticoagulant is Select Anticoagulant(s): plavix, asa. Patients most recent Hgb, Hct, platelets, and INR are listed below.  Recent Labs     05/06/25  0450 05/06/25  0805 05/06/25  1607 05/07/25  0825 05/08/25  0457   HGB 7.5*   < > 7.4* 7.9* 7.7*   HCT 26.5*   < > 26.0* 28.1* 27.3*   *  --   --  129* 131*    < > = values in this interval not displayed.     Plan  - Will trend hemoglobin/hematocrit Every 6 hours  - Will monitor and correct any coagulation defects  - Will transfuse if Hgb is <7g/dl (<8g/dl in cases of active ACS) or if patient has rapid bleeding leading to hemodynamic instability  -  s/p 1 U PRBC  S/p egd and colonosocopy " 05/07/2025  Acute blood loss anemia  Anemia is likely due to acute blood loss which was from GI and Iron deficiency. Most recent hemoglobin and hematocrit are listed below.  Recent Labs     05/06/25  1607 05/07/25  0825 05/08/25  0457   HGB 7.4* 7.9* 7.7*   HCT 26.0* 28.1* 27.3*     Plan  - Monitor serial CBC: Every 6 hours  - Transfuse PRBC if patient becomes hemodynamically unstable, symptomatic or H/H drops below 7/21.  - Patient has received 1 units of PRBCs on 05/05/2025  - Patient's anemia is currently stable  -    Controlled type 2 diabetes mellitus, without long-term current use of insulin  Patient's FSGs are controlled on current medication regimen.  Last A1c reviewed-   Lab Results   Component Value Date    HGBA1C 7.7 (H) 11/14/2024     Most recent fingerstick glucose reviewed-   Recent Labs   Lab 05/07/25  1127 05/07/25  1617 05/07/25  2016 05/08/25  0700   POCTGLUCOSE 138* 140* 149* 161*     Current correctional scale  Low  Maintain anti-hyperglycemic dose as follows-   Antihyperglycemics (From admission, onward)      Start     Stop Route Frequency Ordered    05/06/25 0900  empagliflozin (Jardiance) tablet 10 mg        Question Answer Comment   Does this patient have a diagnosis of heart failure? Yes    Does this patient have type 1 diabetes or diabetic ketoacidosis? No    Does this patient have symptomatic hypotension? No    Is the patient NPO or pending major surgery in next 3 days or less? No        -- Oral Daily 05/05/25 1937    05/06/25 0746  insulin aspart U-100 pen 0-5 Units         -- SubQ Before meals & nightly PRN 05/06/25 0647          Hold Oral hypoglycemics while patient is in the hospital.      Primary hypertension  Patient's blood pressure range in the last 24 hours was: BP  Min: 81/53  Max: 119/62.The patient's inpatient anti-hypertensive regimen is listed below:  Current Antihypertensives  , Daily, Oral  , 2 times daily, Oral  , 2 times daily, Oral  hydrALAZINE tablet 25 mg, 2 times  daily, Oral  lisinopriL tablet 40 mg, Daily, Oral  metoprolol tartrate (LOPRESSOR) split tablet 12.5 mg, 2 times daily, Oral  amLODIPine tablet 5 mg, Daily, Oral    Plan  - BP is controlled, no changes needed to their regimen  -    Duodenal ulcer  Pepcid, protonix careafate  Check h pylori stool    Diverticulosis  On colonoscopy    Lipoma of colon  On colonoscopy  S/p removal    Colon polyps  On colonoscopy  S/p removal  F/u with Dr. Guzman for path    Hemorrhoid  On colonoscopy  F/u with Dr. Guzman post d/c    VTE Risk Mitigation (From admission, onward)           Ordered     IP VTE HIGH RISK PATIENT  Once         05/05/25 1937     Place sequential compression device  Until discontinued         05/05/25 1937     Reason for No Pharmacological VTE Prophylaxis  Once        Question:  Reasons:  Answer:  Active Bleeding    05/05/25 1937                    Discharge Planning   AVELINO: 5/9/2025     Code Status: Full Code   Medical Readiness for Discharge Date:   Discharge Plan A: Return to nursing home   Discharge Delays: None known at this time                    Paz Palm MD  Department of Hospital Medicine   [facility]/Surg

## 2025-05-08 NOTE — ASSESSMENT & PLAN NOTE
Patient's hemorrhage is due to gastrointestinal bleed, this bleeding is associated with an anticoagulant, the anticoagulant is Select Anticoagulant(s): plavix, asa. Patients most recent Hgb, Hct, platelets, and INR are listed below.  Recent Labs     05/06/25  0450 05/06/25  0805 05/06/25  1607 05/07/25  0825 05/08/25  0457   HGB 7.5*   < > 7.4* 7.9* 7.7*   HCT 26.5*   < > 26.0* 28.1* 27.3*   *  --   --  129* 131*    < > = values in this interval not displayed.     Plan  - Will trend hemoglobin/hematocrit Every 6 hours  - Will monitor and correct any coagulation defects  - Will transfuse if Hgb is <7g/dl (<8g/dl in cases of active ACS) or if patient has rapid bleeding leading to hemodynamic instability  -  s/p 1 U PRBC  S/p egd and colonosocopy 05/07/2025

## 2025-05-08 NOTE — ASSESSMENT & PLAN NOTE
S/p 1 U PRBC 05/05/2025  Monitor H&H  Sutgeryfollowing  Ferrlicit x 3 doses  H&H has been stable

## 2025-05-08 NOTE — SUBJECTIVE & OBJECTIVE
Past Medical History:   Diagnosis Date    Anticoagulant long-term use     Anxiety disorder, unspecified     Aphasia     CVA (cerebral vascular accident)     Depression     Diabetes mellitus     GERD (gastroesophageal reflux disease)     Hemiplegia     HTN (hypertension)     Mixed hyperlipidemia     Stroke     Unspecified atrial flutter        Past Surgical History:   Procedure Laterality Date    AMPUTATION, LOWER LIMB Left     BKA    CAROTID ENDARTERECTOMY Left     FRACTURE SURGERY      VASCULAR SURGERY         Review of patient's allergies indicates:  No Known Allergies    No current facility-administered medications on file prior to encounter.     Current Outpatient Medications on File Prior to Encounter   Medication Sig    amLODIPine (NORVASC) 2.5 MG tablet Take 10 mg by mouth once daily.    apixaban (ELIQUIS) 5 mg Tab Take 5 mg by mouth 2 (two) times daily.    clopidogreL (PLAVIX) 75 mg tablet Take 75 mg by mouth once daily.    empagliflozin (JARDIANCE) 10 mg tablet Take 10 mg by mouth once daily.    ergocalciferol (VITAMIN D2) 50,000 unit Cap Take 50,000 Units by mouth every Tues, Fri.    famotidine (PEPCID) 20 MG tablet Take 20 mg by mouth 2 (two) times daily.    fenofibrate 160 MG Tab Take 160 mg by mouth nightly.    folic acid (FOLVITE) 1 MG tablet Take 1 mg by mouth once daily.    furosemide (LASIX) 40 MG tablet Take 40 mg by mouth once daily.    gabapentin (NEURONTIN) 300 MG capsule Take 300 mg by mouth 3 (three) times daily.    hydrALAZINE (APRESOLINE) 25 MG tablet Take 25 mg by mouth 2 (two) times daily.    HYDROcodone-acetaminophen (NORCO) 5-325 mg per tablet Take 1 tablet by mouth every 6 (six) hours as needed for Pain.    lisinopriL (PRINIVIL,ZESTRIL) 40 MG tablet Take 40 mg by mouth once daily.    metFORMIN (GLUCOPHAGE) 1000 MG tablet Take 1,000 mg by mouth 2 (two) times daily with meals.    metoprolol tartrate (LOPRESSOR) 25 MG tablet Take 12.5 mg by mouth 2 (two) times daily.    rosuvastatin  (CRESTOR) 20 MG tablet Take 20 mg by mouth every evening.    sertraline (ZOLOFT) 100 MG tablet Take 100 mg by mouth once daily.    traZODone (DESYREL) 50 MG tablet Take 50 mg by mouth every evening.    busPIRone (BUSPAR) 5 MG Tab Take 5 mg by mouth 2 (two) times daily.     Family History    None       Tobacco Use    Smoking status: Former     Types: Cigarettes    Smokeless tobacco: Not on file   Substance and Sexual Activity    Alcohol use: Not Currently    Drug use: Not Currently    Sexual activity: Not on file     Review of Systems   Constitutional:  Positive for fatigue.   HENT: Negative.     Eyes: Negative.    Respiratory: Negative.     Cardiovascular: Negative.    Gastrointestinal: Negative.    Endocrine: Negative.    Genitourinary: Negative.    Musculoskeletal: Negative.    Skin: Negative.    Allergic/Immunologic: Negative.    Neurological: Negative.    Hematological: Negative.    Psychiatric/Behavioral: Negative.       Objective:     Vital Signs (Most Recent):  Temp: 98.8 °F (37.1 °C) (05/08/25 0715)  Pulse: 102 (05/08/25 0945)  Resp: 18 (05/08/25 0950)  BP: 119/62 (05/08/25 0945)  SpO2: 97 % (05/08/25 0746) Vital Signs (24h Range):  Temp:  [97.6 °F (36.4 °C)-99.1 °F (37.3 °C)] 98.8 °F (37.1 °C)  Pulse:  [] 102  Resp:  [16-18] 18  SpO2:  [95 %-100 %] 97 %  BP: ()/(48-62) 119/62     Weight: 124.6 kg (274 lb 12.8 oz)  Body mass index is 37.27 kg/m².     Physical Exam  Constitutional:       Appearance: Normal appearance. He is obese.   HENT:      Mouth/Throat:      Mouth: Mucous membranes are moist.   Eyes:      Extraocular Movements: Extraocular movements intact.      Conjunctiva/sclera: Conjunctivae normal.      Pupils: Pupils are equal, round, and reactive to light.   Cardiovascular:      Rate and Rhythm: Normal rate and regular rhythm.      Pulses: Normal pulses.      Heart sounds: Normal heart sounds.   Pulmonary:      Effort: Pulmonary effort is normal.      Breath sounds: Normal breath sounds.  "  Abdominal:      General: Abdomen is flat. Bowel sounds are normal.      Palpations: Abdomen is soft.   Musculoskeletal:      Cervical back: Normal range of motion and neck supple.      Comments: Left BKA with dressing on left stump    Neurological:      General: No focal deficit present.      Mental Status: He is alert and oriented to person, place, and time.   Psychiatric:         Mood and Affect: Mood normal.              CRANIAL NERVES     CN III, IV, VI   Pupils are equal, round, and reactive to light.       Significant Labs: All pertinent labs within the past 24 hours have been reviewed.  A1C:   Recent Labs   Lab 11/14/24  1115   HGBA1C 7.7*     ABGs: No results for input(s): "PH", "PCO2", "HCO3", "POCSATURATED", "BE", "TOTALHB", "COHB", "METHB", "O2HB", "POCFIO2", "PO2" in the last 48 hours.  Bilirubin:   Recent Labs   Lab 05/05/25  1809   BILITOT 0.4     Blood Culture: No results for input(s): "LABBLOO" in the last 48 hours.  BMP:   Recent Labs   Lab 05/08/25  0457   *      K 3.8      CO2 34*   BUN 19   CREATININE 1.04   CALCIUM 8.0*     CBC:   Recent Labs   Lab 05/06/25  1607 05/07/25  0825 05/08/25  0457   WBC  --  5.85 6.74   HGB 7.4* 7.9* 7.7*   HCT 26.0* 28.1* 27.3*   PLT  --  129* 131*     CMP:   Recent Labs   Lab 05/07/25  0739 05/08/25  0457    138   K 4.2 3.8    104   CO2 33* 34*    154*   BUN 24* 19   CREATININE 0.79 1.04   CALCIUM 8.1* 8.0*     Cardiac Markers: No results for input(s): "CKMB", "MYOGLOBIN", "BNP", "TROPISTAT" in the last 48 hours.  Coagulation: No results for input(s): "PT", "INR", "APTT" in the last 48 hours.  Lactic Acid: No results for input(s): "LACTATE" in the last 48 hours.  Lipase: No results for input(s): "LIPASE" in the last 48 hours.  Lipid Panel: No results for input(s): "CHOL", "HDL", "LDLCALC", "TRIG", "CHOLHDL" in the last 48 hours.  Magnesium: No results for input(s): "MG" in the last 48 hours.  Pathology Results  (Last 10 " "years)                 23 1320  Specimen to Pathology Final result    Narrative:  Patient - KEEGAN BOOTH                -  Sex- M   Med Rec # - [id number]                        Castillo Rosen   The following is an electronic copy of report # [id number] from:    [facility]   [address] [place] [address]                          Phone [phone]   DIAGNOSIS:   2023      RBW:liseth   BLOOD VESSEL, LEFT CAROTID, ENDARTERECTOMY:   - PARTIALLY CALCIFIED ATHEROMATOUS PLAQUE.     _______________________________________________________________________   SPECIMEN AND SOURCE:   LEFT CAROTID PLAQUE   CLINICAL INFORMATION:   OCCLUSION AND STENOSIS OF LEFT CAROTID ARTERY   GROSS EXAMINATION:   2023  WO/WPL   Received in formalin, labeled with the patient's name, "Keegan Booth," medical record number, "left carotid plaque," is a firm portion    of tubular white-tan tissue, 5.4 cm in length and 0.6 to 1 cm in    diameter.  The lining is smooth, white-tan with focal firm white areas    and pale yellow calcifications within the wall.  Representative sections    submitted in 1A.     MICROSCOPIC DESCRIPTION:   Unless gross only is specified, the final diagnosis for each specimen is    based on a microscopic examination of representative sections of tissue    by digitally imaged slide(s), to include immunohistochemical and special    stains if performed.    CODE: 0   Pathologist: DEMAR Elizondo MD, FCAP (Electronic Signature)    2023 4:44 PM    [facility], [facility] * [address] *    [place] [address]    Technical services performed at: The "[employer] [facility], [facility] * [address] [place] [address] * [place] [address]    Gross examination performed at: The "[facility] [facility], [facility] * [address] [place] [address] * [place] [address]              POCT Glucose:   Recent Labs   Lab 25  1617 " "05/07/25 2016 05/08/25  0700   POCTGLUCOSE 140* 149* 161*     Prealbumin: No results for input(s): "PREALBUMIN" in the last 48 hours.  Respiratory Culture: No results for input(s): "GSRESP", "RESPIRATORYC" in the last 48 hours.  Troponin: No results for input(s): "TROPONINI", "TROPONINIHS" in the last 48 hours.  TSH: No results for input(s): "TSH" in the last 4320 hours.  Urine Culture: No results for input(s): "LABURIN" in the last 48 hours.  Urine Studies: No results for input(s): "COLORU", "APPEARANCEUA", "PHUR", "SPECGRAV", "PROTEINUA", "GLUCUA", "KETONESU", "BILIRUBINUA", "OCCULTUA", "NITRITE", "UROBILINOGEN", "LEUKOCYTESUR", "RBCUA", "WBCUA", "BACTERIA", "SQUAMEPITHEL", "HYALINECASTS" in the last 48 hours.    Invalid input(s): "WRIGHTSUR"    Significant Imaging:   "

## 2025-05-08 NOTE — ASSESSMENT & PLAN NOTE
The likely etiology of thrombocytopenia is unknown. The patients 3 most recent labs are listed below.  Recent Labs     05/06/25  0450 05/07/25  0825 05/08/25  0457   * 129* 131*     Plan  - Will transfuse if platelet count is <50k (if undergoing surgical procedure or have active bleeding).  -

## 2025-05-08 NOTE — ASSESSMENT & PLAN NOTE
Patient's blood pressure range in the last 24 hours was: BP  Min: 81/53  Max: 119/62.The patient's inpatient anti-hypertensive regimen is listed below:  Current Antihypertensives  , Daily, Oral  , 2 times daily, Oral  , 2 times daily, Oral  hydrALAZINE tablet 25 mg, 2 times daily, Oral  lisinopriL tablet 40 mg, Daily, Oral  metoprolol tartrate (LOPRESSOR) split tablet 12.5 mg, 2 times daily, Oral  amLODIPine tablet 5 mg, Daily, Oral    Plan  - BP is controlled, no changes needed to their regimen  -

## 2025-05-08 NOTE — ASSESSMENT & PLAN NOTE
Patient's FSGs are controlled on current medication regimen.  Last A1c reviewed-   Lab Results   Component Value Date    HGBA1C 7.7 (H) 11/14/2024     Most recent fingerstick glucose reviewed-   Recent Labs   Lab 05/07/25  1127 05/07/25  1617 05/07/25 2016 05/08/25  0700   POCTGLUCOSE 138* 140* 149* 161*     Current correctional scale  Low  Maintain anti-hyperglycemic dose as follows-   Antihyperglycemics (From admission, onward)      Start     Stop Route Frequency Ordered    05/06/25 0900  empagliflozin (Jardiance) tablet 10 mg        Question Answer Comment   Does this patient have a diagnosis of heart failure? Yes    Does this patient have type 1 diabetes or diabetic ketoacidosis? No    Does this patient have symptomatic hypotension? No    Is the patient NPO or pending major surgery in next 3 days or less? No        -- Oral Daily 05/05/25 1937    05/06/25 0746  insulin aspart U-100 pen 0-5 Units         -- SubQ Before meals & nightly PRN 05/06/25 0647          Hold Oral hypoglycemics while patient is in the hospital.

## 2025-05-09 PROBLEM — R13.10 DYSPHAGIA: Status: ACTIVE | Noted: 2025-05-09

## 2025-05-09 LAB
ABO + RH BLD: NORMAL
ABO + RH BLD: NORMAL
ABS NEUT CALC (OHS): 4.58 X10(3)/MCL (ref 2.1–9.2)
ANION GAP SERPL CALC-SCNC: -1 MEQ/L (ref 2–13)
ANISOCYTOSIS BLD QL SMEAR: ABNORMAL
BLD PROD TYP BPU: NORMAL
BLD PROD TYP BPU: NORMAL
BLOOD UNIT EXPIRATION DATE: NORMAL
BLOOD UNIT EXPIRATION DATE: NORMAL
BLOOD UNIT TYPE CODE: 7300
BLOOD UNIT TYPE CODE: 7300
BUN SERPL-MCNC: 19 MG/DL (ref 7–20)
CALCIUM SERPL-MCNC: 8.2 MG/DL (ref 8.4–10.2)
CHLORIDE SERPL-SCNC: 106 MMOL/L (ref 98–110)
CO2 SERPL-SCNC: 35 MMOL/L (ref 21–32)
CREAT SERPL-MCNC: 0.91 MG/DL (ref 0.66–1.25)
CREAT/UREA NIT SERPL: 21 (ref 12–20)
CROSSMATCH INTERPRETATION: NORMAL
CROSSMATCH INTERPRETATION: NORMAL
DISPENSE STATUS: NORMAL
DISPENSE STATUS: NORMAL
EOSINOPHIL NFR BLD MANUAL: 0.14 X10(3)/MCL (ref 0–0.9)
EOSINOPHIL NFR BLD MANUAL: 2 % (ref 0–3)
ERYTHROCYTE [DISTWIDTH] IN BLOOD BY AUTOMATED COUNT: 17.2 %
GFR SERPLBLD CREATININE-BSD FMLA CKD-EPI: >90 ML/MIN/1.73/M2
GLUCOSE SERPL-MCNC: 139 MG/DL (ref 70–115)
HCT VFR BLD AUTO: 27.5 % (ref 36–52)
HGB BLD-MCNC: 7.7 G/DL (ref 13–18)
HYPOCHROMIA BLD QL SMEAR: ABNORMAL
LYMPH ABN # BLD MANUAL: 5 %
LYMPHOCYTES NFR BLD MANUAL: 0.83 X10(3)/MCL (ref 0.6–4.6)
LYMPHOCYTES NFR BLD MANUAL: 12 % (ref 20–55)
MCH RBC QN AUTO: 22.8 PG (ref 27–34)
MCHC RBC AUTO-ENTMCNC: 28 G/DL (ref 31–37)
MCV RBC AUTO: 81.6 FL (ref 79–99)
METAMYELOCYTES NFR BLD MANUAL: 1 %
MICROCYTES BLD QL SMEAR: ABNORMAL
MONOCYTES NFR BLD MANUAL: 0.9 X10(3)/MCL (ref 0.1–1.3)
MONOCYTES NFR BLD MANUAL: 13 % (ref 0–10)
MYELOCYTES NFR BLD MANUAL: 1 %
NEUTROPHILS NFR BLD MANUAL: 64 % (ref 37–73)
NEUTS BAND NFR BLD MANUAL: 2 % (ref 0–5)
NRBC BLD AUTO-RTO: 0.6 %
NRBC BLD MANUAL-RTO: 1 % (ref 0–1)
PLATELET # BLD AUTO: 126 X10(3)/MCL (ref 140–371)
PLATELET # BLD EST: ABNORMAL 10*3/UL
PMV BLD AUTO: 10.6 FL (ref 9.4–12.4)
POCT GLUCOSE: 177 MG/DL (ref 70–110)
POCT GLUCOSE: 184 MG/DL (ref 70–110)
POCT GLUCOSE: 193 MG/DL (ref 70–110)
POCT GLUCOSE: 228 MG/DL (ref 70–110)
POIKILOCYTOSIS BLD QL SMEAR: ABNORMAL
POTASSIUM SERPL-SCNC: 3.7 MMOL/L (ref 3.5–5.1)
RBC # BLD AUTO: 3.37 X10(6)/MCL (ref 4–6)
SODIUM SERPL-SCNC: 140 MMOL/L (ref 136–145)
TARGETS BLD QL SMEAR: ABNORMAL
TEAR DROP CELL (OLG): ABNORMAL
UNIT NUMBER: NORMAL
UNIT NUMBER: NORMAL
WBC # BLD AUTO: 6.94 X10(3)/MCL (ref 4–11.5)

## 2025-05-09 PROCEDURE — 25000003 PHARM REV CODE 250: Performed by: FAMILY MEDICINE

## 2025-05-09 PROCEDURE — 63600175 PHARM REV CODE 636 W HCPCS: Performed by: FAMILY MEDICINE

## 2025-05-09 PROCEDURE — 94761 N-INVAS EAR/PLS OXIMETRY MLT: CPT

## 2025-05-09 PROCEDURE — 80048 BASIC METABOLIC PNL TOTAL CA: CPT | Performed by: FAMILY MEDICINE

## 2025-05-09 PROCEDURE — 27000221 HC OXYGEN, UP TO 24 HOURS

## 2025-05-09 PROCEDURE — 99900035 HC TECH TIME PER 15 MIN (STAT)

## 2025-05-09 PROCEDURE — 25000003 PHARM REV CODE 250

## 2025-05-09 PROCEDURE — 92610 EVALUATE SWALLOWING FUNCTION: CPT

## 2025-05-09 PROCEDURE — 11000001 HC ACUTE MED/SURG PRIVATE ROOM

## 2025-05-09 PROCEDURE — 85007 BL SMEAR W/DIFF WBC COUNT: CPT | Performed by: FAMILY MEDICINE

## 2025-05-09 PROCEDURE — 36415 COLL VENOUS BLD VENIPUNCTURE: CPT | Performed by: FAMILY MEDICINE

## 2025-05-09 RX ADMIN — PANTOPRAZOLE SODIUM 40 MG: 40 TABLET, DELAYED RELEASE ORAL at 08:05

## 2025-05-09 RX ADMIN — MICONAZOLE NITRATE 2 % TOPICAL POWDER: at 08:05

## 2025-05-09 RX ADMIN — FAMOTIDINE 20 MG: 20 TABLET, FILM COATED ORAL at 08:05

## 2025-05-09 RX ADMIN — GABAPENTIN 300 MG: 300 CAPSULE ORAL at 08:05

## 2025-05-09 RX ADMIN — SODIUM CHLORIDE 125 MG: 9 INJECTION, SOLUTION INTRAVENOUS at 09:05

## 2025-05-09 RX ADMIN — INSULIN ASPART 1 UNITS: 100 INJECTION, SOLUTION INTRAVENOUS; SUBCUTANEOUS at 08:05

## 2025-05-09 RX ADMIN — SUCRALFATE 1 G: 1 TABLET ORAL at 06:05

## 2025-05-09 RX ADMIN — SUCRALFATE 1 G: 1 TABLET ORAL at 08:05

## 2025-05-09 RX ADMIN — FENOFIBRATE 145 MG: 145 TABLET ORAL at 08:05

## 2025-05-09 RX ADMIN — FOLIC ACID 1 MG: 1 TABLET ORAL at 08:05

## 2025-05-09 RX ADMIN — BUSPIRONE HYDROCHLORIDE 5 MG: 5 TABLET ORAL at 08:05

## 2025-05-09 RX ADMIN — GABAPENTIN 300 MG: 300 CAPSULE ORAL at 03:05

## 2025-05-09 RX ADMIN — SODIUM CHLORIDE 500 ML: 9 INJECTION, SOLUTION INTRAVENOUS at 09:05

## 2025-05-09 RX ADMIN — ATORVASTATIN CALCIUM 80 MG: 40 TABLET, FILM COATED ORAL at 08:05

## 2025-05-09 RX ADMIN — HYDROCODONE BITARTRATE AND ACETAMINOPHEN 1 TABLET: 5; 325 TABLET ORAL at 09:05

## 2025-05-09 RX ADMIN — SUCRALFATE 1 G: 1 TABLET ORAL at 11:05

## 2025-05-09 RX ADMIN — EMPAGLIFLOZIN 10 MG: 10 TABLET, FILM COATED ORAL at 08:05

## 2025-05-09 RX ADMIN — SERTRALINE HYDROCHLORIDE 100 MG: 50 TABLET ORAL at 08:05

## 2025-05-09 RX ADMIN — SUCRALFATE 1 G: 1 TABLET ORAL at 03:05

## 2025-05-09 RX ADMIN — HYDROCODONE BITARTRATE AND ACETAMINOPHEN 1 TABLET: 5; 325 TABLET ORAL at 06:05

## 2025-05-09 RX ADMIN — HYDROCODONE BITARTRATE AND ACETAMINOPHEN 1 TABLET: 5; 325 TABLET ORAL at 03:05

## 2025-05-09 RX ADMIN — TRAZODONE HYDROCHLORIDE 50 MG: 50 TABLET ORAL at 08:05

## 2025-05-09 NOTE — ASSESSMENT & PLAN NOTE
The likely etiology of thrombocytopenia is unknown. The patients 3 most recent labs are listed below.  Recent Labs     05/07/25  0825 05/08/25  0457 05/09/25  0824   * 131* 126*     Plan  - Will transfuse if platelet count is <50k (if undergoing surgical procedure or have active bleeding).  -

## 2025-05-09 NOTE — PROGRESS NOTES
[facility]-Med/Surg  Wound Care    Patient Name:  Onesimo Booth   MRN:  [id number]  Date: 5/9/2025  Diagnosis: Acute blood loss anemia    History:     Past Medical History:   Diagnosis Date    Anticoagulant long-term use     Anxiety disorder, unspecified     Aphasia     CVA (cerebral vascular accident)     Depression     Diabetes mellitus     GERD (gastroesophageal reflux disease)     Hemiplegia     HTN (hypertension)     Mixed hyperlipidemia     Stroke     Unspecified atrial flutter        Social History[1]    Precautions:     Allergies as of 05/05/2025    (No Known Allergies)       WOC Assessment Details/Treatment        05/09/25 1427   WOCN Assessment   WOCN Total Time (mins) 20   Visit Date 05/09/25   Visit Time 1405   Consult Type New   WOCN Speciality Wound   Wound other   Intervention assessed;changed        Wound 05/09/25 0815 Blister(s) Right anterior;lower Arm   Date First Assessed/Time First Assessed: 05/09/25 0815   Present on Original Admission: No  Primary Wound Type: (c) Blister(s)  Side: Right  Orientation: anterior;lower  Location: Arm   Dressing Appearance Moist drainage   Drainage Amount Copious   Drainage Characteristics/Odor Serous;No odor   Appearance Pink;Moist   Tissue loss description Partial thickness   Periwound Area Edematous   Wound Edges Defined   Wound Length (cm) 0.8 cm   Wound Width (cm) 0.8 cm   Wound Depth (cm) 0.1 cm   Wound Volume (cm^3) 0.034 cm^3   Wound Surface Area (cm^2) 0.5 cm^2   Care Cleansed with:;Antimicrobial agent;Applied:;Skin Barrier   Dressing Applied;Foam   Dressing Change Due 05/14/25      Orders placed.     05/09/2025         [1]   Social History  Socioeconomic History    Marital status: Single   Tobacco Use    Smoking status: Former     Types: Cigarettes   Substance and Sexual Activity    Alcohol use: Not Currently    Drug use: Not Currently     Social Drivers of Health     Financial Resource Strain: Low Risk  (5/6/2025)    Overall Financial  Resource Strain (CARDIA)     Difficulty of Paying Living Expenses: Not very hard   Food Insecurity: No Food Insecurity (5/6/2025)    Hunger Vital Sign     Worried About Running Out of Food in the Last Year: Never true     Ran Out of Food in the Last Year: Never true   Transportation Needs: No Transportation Needs (5/6/2025)    PRAPARE - Transportation     Lack of Transportation (Medical): No     Lack of Transportation (Non-Medical): No   Physical Activity: Unknown (5/6/2025)    Exercise Vital Sign     Days of Exercise per Week: 0 days   Stress: No Stress Concern Present (5/6/2025)    Citizen of [place] [employer] - Occupational Stress Questionnaire     Feeling of Stress : Not at all   Housing Stability: Low Risk  (5/6/2025)    Housing Stability Vital Sign     Unable to Pay for Housing in the Last Year: No     Number of Times Moved in the Last Year: 1     Homeless in the Last Year: No

## 2025-05-09 NOTE — SUBJECTIVE & OBJECTIVE
Past Medical History:   Diagnosis Date    Anticoagulant long-term use     Anxiety disorder, unspecified     Aphasia     CVA (cerebral vascular accident)     Depression     Diabetes mellitus     GERD (gastroesophageal reflux disease)     Hemiplegia     HTN (hypertension)     Mixed hyperlipidemia     Stroke     Unspecified atrial flutter        Past Surgical History:   Procedure Laterality Date    AMPUTATION, LOWER LIMB Left     BKA    CAROTID ENDARTERECTOMY Left     FRACTURE SURGERY      VASCULAR SURGERY         Review of patient's allergies indicates:  No Known Allergies    No current facility-administered medications on file prior to encounter.     Current Outpatient Medications on File Prior to Encounter   Medication Sig    amLODIPine (NORVASC) 2.5 MG tablet Take 10 mg by mouth once daily.    apixaban (ELIQUIS) 5 mg Tab Take 5 mg by mouth 2 (two) times daily.    clopidogreL (PLAVIX) 75 mg tablet Take 75 mg by mouth once daily.    empagliflozin (JARDIANCE) 10 mg tablet Take 10 mg by mouth once daily.    ergocalciferol (VITAMIN D2) 50,000 unit Cap Take 50,000 Units by mouth every Tues, Fri.    famotidine (PEPCID) 20 MG tablet Take 20 mg by mouth 2 (two) times daily.    fenofibrate 160 MG Tab Take 160 mg by mouth nightly.    folic acid (FOLVITE) 1 MG tablet Take 1 mg by mouth once daily.    furosemide (LASIX) 40 MG tablet Take 40 mg by mouth once daily.    gabapentin (NEURONTIN) 300 MG capsule Take 300 mg by mouth 3 (three) times daily.    HYDROcodone-acetaminophen (NORCO) 5-325 mg per tablet Take 1 tablet by mouth every 6 (six) hours as needed for Pain.    lisinopriL (PRINIVIL,ZESTRIL) 40 MG tablet Take 40 mg by mouth once daily.    metFORMIN (GLUCOPHAGE) 1000 MG tablet Take 1,000 mg by mouth 2 (two) times daily with meals.    metoprolol tartrate (LOPRESSOR) 25 MG tablet Take 12.5 mg by mouth 2 (two) times daily.    rosuvastatin (CRESTOR) 20 MG tablet Take 20 mg by mouth every evening.    sertraline (ZOLOFT) 100 MG  tablet Take 100 mg by mouth once daily.    traZODone (DESYREL) 50 MG tablet Take 50 mg by mouth every evening.    [DISCONTINUED] busPIRone (BUSPAR) 5 MG Tab Take 5 mg by mouth 2 (two) times daily.    [DISCONTINUED] hydrALAZINE (APRESOLINE) 25 MG tablet Take 25 mg by mouth 2 (two) times daily.     Family History    None       Tobacco Use    Smoking status: Former     Types: Cigarettes    Smokeless tobacco: Not on file   Substance and Sexual Activity    Alcohol use: Not Currently    Drug use: Not Currently    Sexual activity: Not on file     Review of Systems   Constitutional:  Positive for fatigue.   HENT: Negative.     Eyes: Negative.    Respiratory: Negative.     Cardiovascular: Negative.    Gastrointestinal: Negative.    Endocrine: Negative.    Genitourinary: Negative.    Musculoskeletal: Negative.    Skin: Negative.    Allergic/Immunologic: Negative.    Neurological: Negative.    Hematological: Negative.    Psychiatric/Behavioral: Negative.       Objective:     Vital Signs (Most Recent):  Temp: 98.6 °F (37 °C) (05/09/25 1100)  Pulse: 86 (05/09/25 1100)  Resp: 18 (05/09/25 1100)  BP: (!) 91/53 (05/09/25 1100)  SpO2: 95 % (05/09/25 1100) Vital Signs (24h Range):  Temp:  [97.7 °F (36.5 °C)-99.8 °F (37.7 °C)] 98.6 °F (37 °C)  Pulse:  [83-95] 86  Resp:  [18-20] 18  SpO2:  [92 %-95 %] 95 %  BP: ()/(43-54) 91/53     Weight: 124.6 kg (274 lb 12.8 oz)  Body mass index is 37.27 kg/m².     Physical Exam  Constitutional:       Appearance: Normal appearance. He is obese.   HENT:      Mouth/Throat:      Mouth: Mucous membranes are moist.   Eyes:      Extraocular Movements: Extraocular movements intact.      Conjunctiva/sclera: Conjunctivae normal.      Pupils: Pupils are equal, round, and reactive to light.   Cardiovascular:      Rate and Rhythm: Normal rate and regular rhythm.      Pulses: Normal pulses.      Heart sounds: Normal heart sounds.   Pulmonary:      Effort: Pulmonary effort is normal.      Breath sounds:  "Normal breath sounds.   Abdominal:      General: Abdomen is flat. Bowel sounds are normal.      Palpations: Abdomen is soft.   Musculoskeletal:      Cervical back: Normal range of motion and neck supple.      Right lower leg: Edema present.      Left lower leg: Edema present.      Comments: Left BKA   Neurological:      General: No focal deficit present.      Mental Status: He is alert and oriented to person, place, and time.   Psychiatric:         Mood and Affect: Mood normal.              CRANIAL NERVES     CN III, IV, VI   Pupils are equal, round, and reactive to light.       Significant Labs: All pertinent labs within the past 24 hours have been reviewed.  A1C:   Recent Labs   Lab 11/14/24  1115   HGBA1C 7.7*     ABGs: No results for input(s): "PH", "PCO2", "HCO3", "POCSATURATED", "BE", "TOTALHB", "COHB", "METHB", "O2HB", "POCFIO2", "PO2" in the last 48 hours.  Bilirubin:   Recent Labs   Lab 05/05/25  1809   BILITOT 0.4     Blood Culture: No results for input(s): "LABBLOO" in the last 48 hours.  BMP:   Recent Labs   Lab 05/09/25  0513   *      K 3.7      CO2 35*   BUN 19   CREATININE 0.91   CALCIUM 8.2*     CBC:   Recent Labs   Lab 05/08/25  0457 05/09/25  0824   WBC 6.74 6.94   HGB 7.7* 7.7*   HCT 27.3* 27.5*   * 126*     CMP:   Recent Labs   Lab 05/08/25  0457 05/09/25  0513    140   K 3.8 3.7    106   CO2 34* 35*   * 139*   BUN 19 19   CREATININE 1.04 0.91   CALCIUM 8.0* 8.2*     Cardiac Markers: No results for input(s): "CKMB", "MYOGLOBIN", "BNP", "TROPISTAT" in the last 48 hours.  Coagulation: No results for input(s): "PT", "INR", "APTT" in the last 48 hours.  Lactic Acid: No results for input(s): "LACTATE" in the last 48 hours.  Lipase: No results for input(s): "LIPASE" in the last 48 hours.  Lipid Panel: No results for input(s): "CHOL", "HDL", "LDLCALC", "TRIG", "CHOLHDL" in the last 48 hours.  Magnesium: No results for input(s): "MG" in the last 48 " "hours.  Pathology Results  (Last 10 years)                 23 1320  Specimen to Pathology Final result    Narrative:  Patient - KEEGAN BOOTH                -  Sex- M   Med Rec # - [id number]                        Castillo Rosen   The following is an electronic copy of report # [id number] from:    [facility]   [address] [place] [address]                          Phone [phone]   DIAGNOSIS:   2023      RBW:liseth   BLOOD VESSEL, LEFT CAROTID, ENDARTERECTOMY:   - PARTIALLY CALCIFIED ATHEROMATOUS PLAQUE.     _______________________________________________________________________   SPECIMEN AND SOURCE:   LEFT CAROTID PLAQUE   CLINICAL INFORMATION:   OCCLUSION AND STENOSIS OF LEFT CAROTID ARTERY   GROSS EXAMINATION:   2023  WO/WPL   Received in formalin, labeled with the patient's name, "Keegan Booth," medical record number, "left carotid plaque," is a firm portion    of tubular white-tan tissue, 5.4 cm in length and 0.6 to 1 cm in    diameter.  The lining is smooth, white-tan with focal firm white areas    and pale yellow calcifications within the wall.  Representative sections    submitted in 1A.     MICROSCOPIC DESCRIPTION:   Unless gross only is specified, the final diagnosis for each specimen is    based on a microscopic examination of representative sections of tissue    by digitally imaged slide(s), to include immunohistochemical and special    stains if performed.    CODE: 0   Pathologist: DEMAR Elizondo MD, FCAP (Electronic Signature)    2023 4:44 PM    [facility], [facility] * [address] *    [place] [address]    Technical services performed at: [facility] [employer] [facility] * [address] [place] [address] * [place] [address]    Gross examination performed at: [facility] [employer] [facility], [facility] * [address] [place] [address] * [place] [address]              POCT Glucose:   Recent " "Labs   Lab 05/08/25 2015 05/09/25  0714 05/09/25  1121   POCTGLUCOSE 168* 184* 193*     Prealbumin: No results for input(s): "PREALBUMIN" in the last 48 hours.  Respiratory Culture: No results for input(s): "GSRESP", "RESPIRATORYC" in the last 48 hours.  Troponin: No results for input(s): "TROPONINI", "TROPONINIHS" in the last 48 hours.  TSH: No results for input(s): "TSH" in the last 4320 hours.  Urine Culture: No results for input(s): "LABURIN" in the last 48 hours.  Urine Studies: No results for input(s): "COLORU", "APPEARANCEUA", "PHUR", "SPECGRAV", "PROTEINUA", "GLUCUA", "KETONESU", "BILIRUBINUA", "OCCULTUA", "NITRITE", "UROBILINOGEN", "LEUKOCYTESUR", "RBCUA", "WBCUA", "BACTERIA", "SQUAMEPITHEL", "HYALINECASTS" in the last 48 hours.    Invalid input(s): "WRIGHTSUR"    Significant Imaging:   "

## 2025-05-09 NOTE — NURSING
Pt blood pressure low 86/53, notified Dr. Palm and am blood pressure meds held this am with new orders put in by Dr Palm.pt also coughing while eating breakfast so speech therapy consult placed per Dr. Palm

## 2025-05-09 NOTE — PT/OT/SLP EVAL
[facility]  Speech Therapy Clinical Swallow Exam    Name: Onesimo Booth    : 1955 (69 y.o.)  MRN: [id number]  Chief Complaint:   Chief Complaint   Patient presents with    Abnormal Labs     PT to ER via [facility] from [facility], report states PT has abnormal labs including low H&H.      HPI: 69-year-old male with hx of NIDDM, PVD Left BKA, HTN, Left [facility] resident of NH on plavix and Eliquis not sure why was sent for abnormal lab and generalize weakness.  He was found on a CBC earlier today to be anemic, with a hemoglobin of 6.5.  This is new for him.  Denies Hematochezia or melena, no endoscopy reported he is accompanied by his daughter at bedside. Pt is Poor historian. Surgery consulted for scope, Pt is getting one unit of PRBC and protonix per ED       Past Medical History:   Diagnosis Date    Anticoagulant long-term use     Anxiety disorder, unspecified     Aphasia     CVA (cerebral vascular accident)     Depression     Diabetes mellitus     GERD (gastroesophageal reflux disease)     Hemiplegia     HTN (hypertension)     Mixed hyperlipidemia     Stroke     Unspecified atrial flutter        Past Surgical History:   Procedure Laterality Date    AMPUTATION, LOWER LIMB Left     BKA    CAROTID ENDARTERECTOMY Left     FRACTURE SURGERY      VASCULAR SURGERY       EXAMINATION:  Single view chest radiograph.     CLINICAL HISTORY:  Sob;     TECHNIQUE:  Single view of the chest.     COMPARISON:  Chest radiograph 2023.     FINDINGS:  There are small bilateral effusions with mild interstitial edema and lower lobe atelectasis.  There is no pneumothorax.  The cardiac silhouette is normal in size.  There is no acute osseous abnormality.     Impression:     Small bilateral effusions with mild interstitial edema and bilateral lower lobe atelectasis.        Electronically signed by:Louie Cabrera MD  Date:                                            2025  Time:                              "              09:03      Subjective:       Pt REPORT / INTERVIEW: Pt alert, Daughter at bedside. Both deny any swallowing difficulties. Hx of multiple CVAs  ORIENTATION: x 3  AFFECT: appropriate  GENERAL COGNITIVE IMPRESSION: impaired.     Objective:       ORAL MUSCULATURE  Dentition: scattered dentition  Secretion Management: adequate  Mucosal Quality: good  Facial Movement: WFL  Buccal Strength & Mobility: WFL  Mandibular Strength & Mobility: WFL  Oral Labial Strength & Mobility: WFL  Lingual Strength & Mobility: WFL  Vocal Quality: adequate  Volitional Cough: Able to clear secretions  Volitional Swallow: Able to palpate laryngeal rise.     PO TRIALS  Consistency Fed By Oral Symptoms Pharyngeal Symptoms   Thin liquid by straw Self None None   Puree Self with assistance None None   Regular solid Self None None     Following assessment, SLP returned once Pt received meal consisting of regular textures (spaghetti, bread, zucchini). Upright position. Pt required cues to reduced bite size and to clear oral cavity prior to taking another bite. Daughter did mention that Pt did have a "choking" episode this morning with breakfast. Cough noted x 1 during portion of meal observed.       *Thicken liquids were not used in this assessment. Terencefe (2018) reported that thickened liquids have no sound evidence as reducing risk of pneumonia in patients with dysphagia and can cause harm by increasing risk of dehydration. It also presents an increased risk of UTI, electrolyte imbalance, constipation, fecal impaction, cognitive impairment, functional decline, and even death (Langmore, 2002; Hank, 2016).  This supports the assertion that we should confirm a patient requires thickened liquids with an instrumental swallow study prior to recommending them.    Limitations: poor dentition and poor endurance    Assessment :     INTERPRETATION:      Dysphagia suspected, likely due to position and behavior factors. Education provided to " Pt and Daughter. Cough noted x 1 during both sessions SLP spent with Pt. Will maintain current diet and monitor closely.       PILLARS OF PNA: According to Dr. Louie Knott (2005), there are 3 pillars that contribute to aspiration related pneumonia. The Three Pillars of Aspiration Pneumonia is research showing that aspiration pneumonia only develops when three factors are present: aspiration, compromised immune system, poor oral hygiene. Pt presents with the following Pillars of Pneumonia (Nikos, 2008):  Risk factors Present (yes/no) Comments:   Impaired health status yes    Poor oral health  no    High prevalence of dysphagia risk factors no        Functional Oral Intake Scale (FOIS) (Kera et al., 2005)  The FOIS is a clinical assessment measure used the define the functional oral intake of patients.     FOIS level   Description   1  Nothing by mouth.   2  Tube dependent with minimal attempts of food or liquid.   3  Tube dependent with consistent oral intake of food or liquid.   4  Total oral diet of a single consistency.   5  Total oral diet with multiple consistencies, but required special    preparation or compensation.   6  Total oral diet with multiple consistencies without special preparation    but with specific food limitations.   7  Total oral diet with no restrictions.     Patient's current FOIS score = 7        Continue to monitor for signs and symptoms of aspiration and discontinue oral feeding should you notice any of the following: watery eyes, reddened facial area, wet vocal quality, increased work of breathing, change in respiratory status, increased congestion, coughing, fever and/or change in level of alertness.     Plan:     Multidisciplinary Problems       SLP Goals          Problem: SLP    Goal Priority Disciplines Outcome   SLP Goal     SLP Progressing   Description: LTG:  -Pt will maintain adequate hydration/nutrition with optimum safety and efficiency of swallowing function on P.O. intake  without overt signs and symptoms of aspiration for the highest appropriate diet level.    -Pt will utilize compensatory strategies with optimum safety and efficiency of swallowing function on P.O. intake without overt signs and symptoms of aspiration for the highest appropriate diet level.        STG:  -Pt will complete a Modified Barium Swallow Study to fully assess physiology and anatomy of the swallow and to determine the appropriate diet and/or rehabilitation exercises/POC.    -Pt will tolerate diet upgrade trials without signs and/or symptoms of aspiration with to safely least restrictive diet with (min/mod/max) verbal, visual and tactile cues.      -Pt will safely ingest diet trials during therapeutic feedings with the SLP without signs and/or symptoms of aspiration with to safely consume least restrictive diet with (min/mod/max) verbal, visual and tactile cues.                         Current Diet Level Recommendations: Continue current diet of regular textures with thin liquids  Aspiration Precautions: upright position, chair or EOB, slow eating rate with small bites/sips, cyclic ingestion, stringent oral care.     Patient to be seen:   (3-5 x/week)   Plan of Care expires:  05/16/25  Plan of Care reviewed with: Care Team   SLP Follow-Up:  Yes       Discharge recommendations: To Be Determined  Barriers to Discharge:  Level of Skilled Assistance Needed      SLP Treatment Date:   05/09/25  Speech Start Time:  1100  1140  Speech Stop Time:  1119   1155  Speech Total Time (min):  19 min + 15 min = 34      Billable minutes:  Swallow and Oral Function Evaluation, 34 minutes     Kaya Zimmer M.S., L-SLP, CCC-SLP  05/09/2025

## 2025-05-09 NOTE — PLAN OF CARE
Problem: Adult Inpatient Plan of Care  Goal: Plan of Care Review  Outcome: Progressing  Goal: Patient-Specific Goal (Individualized)  Outcome: Progressing  Goal: Absence of Hospital-Acquired Illness or Injury  Outcome: Progressing  Goal: Optimal Comfort and Wellbeing  Outcome: Progressing  Goal: Readiness for Transition of Care  Outcome: Progressing     Problem: Skin Injury Risk Increased  Goal: Skin Health and Integrity  Outcome: Progressing     Problem: Anemia  Goal: Anemia Symptom Improvement  Outcome: Progressing     Problem: Fall Injury Risk  Goal: Absence of Fall and Fall-Related Injury  Outcome: Progressing

## 2025-05-09 NOTE — PLAN OF CARE
05/09/25 0915   Discharge Reassessment   Assessment Type Discharge Planning Reassessment   Did the patient's condition or plan change since previous assessment? Yes   Discharge Plan discussed with: Patient   Communicated AVELINO with patient/caregiver Yes   Discharge Plan A Return to nursing home   Discharge Plan B Return to Nursing Home   DME Needed Upon Discharge  none;negative pressure wound therapy device   Transition of Care Barriers None   Why the patient remains in the hospital Requires continued medical care   Post-Acute Status   Coverage medicare   Discharge Delays None known at this time

## 2025-05-09 NOTE — CONSULTS
Consult received.  Patient has been seen.  Orders have been placed.  Will continue to follow during hospital stay.

## 2025-05-09 NOTE — ASSESSMENT & PLAN NOTE
Patient's blood pressure range in the last 24 hours was: BP  Min: 82/54  Max: 103/53.The patient's inpatient anti-hypertensive regimen is listed below:  Current Antihypertensives  Will hold all BP meds today

## 2025-05-09 NOTE — ASSESSMENT & PLAN NOTE
Patient's hemorrhage is due to gastrointestinal bleed, this bleeding is associated with an anticoagulant, the anticoagulant is Select Anticoagulant(s): plavix, asa. Patients most recent Hgb, Hct, platelets, and INR are listed below.  Recent Labs     05/07/25  0825 05/08/25  0457 05/09/25  0824   HGB 7.9* 7.7* 7.7*   HCT 28.1* 27.3* 27.5*   * 131* 126*     Plan  - Will trend hemoglobin/hematocrit Every 6 hours  - Will monitor and correct any coagulation defects  - Will transfuse if Hgb is <7g/dl (<8g/dl in cases of active ACS) or if patient has rapid bleeding leading to hemodynamic instability  -  s/p 1 U PRBC  S/p egd and colonosocopy 05/07/2025

## 2025-05-09 NOTE — PROGRESS NOTES
[facility]/Surg  [facility] Medicine  Progress Note    Patient Name: Onesimo Booth  MRN: [id number]  Patient Class: IP- Inpatient   Admission Date: 5/5/2025  Length of Stay: 3 days  Attending Physician: Paz Palm MD  Primary Care Provider: Denys Mckeon III, MD        Subjective     Principal Problem:Acute blood loss anemia        HPI:  69-year-old male with hx of NIDDM, PVD Left BKA, HTN, Left [facility] resident of NH on plavix and Eliquis not sure why was sent for abnormal lab and generalize weakness.  He was found on a CBC earlier today to be anemic, with a hemoglobin of 6.5.  This is new for him.  Denies Hematochezia or melena, no endoscopy reported he is accompanied by his daughter at bedside. Pt is Poor historian. Surgery consulted for scope, Pt is getting one unit of PRBC and protonix per ED    Overview/Hospital Course:   05/06/2025 pt admitted with GI bleed.  S/p 1 U PRBCS and hgb at 7.5 stable last several draws.    05/07/2025 pt s/p EGD shows ulver of duodenum,.  Multiple polyps removed, lipoma and diverticuli and hemorrhoids. Pt H&H stable after 1 U and no c/o at present  05/08/2025 pt with GI bleed, some SOB not resolved with transfusion, still requires oxygen sats 89-90% on RA at rest.  Has not been on oxygen in the past, last echo [facility] 06/2024 EF was normal.  Pt denies SOB and denies chest pain, no cough or fever or WBC  CXR this am mild radha pulmonary edema, chronic RLE and RUE edema iproved since admit.    Will get echo and continue to wean oxygen as tolerated.  May need prn oxygen at the nursing home.  05/09/2025 admitted with GIB, still with SOB and requries oxygen, currently on 1-2L daughter says he dropped in the 70s while sleeping, does not hear him snoring, nurse documents sats in the 90s.  CXR did show some mild pulmonary edema, gave lasix yesterday, BP low this am reviewed nursing home mar and apparently they had stopped hydralazine the day he was sent here due to  lower BP there.  BP meds have been held offf and on last 2-3 days. Gave 500cc NS bolus this am, he is awake and alert at his mental status baseline.  Anemia is iron deficiency and he is received ferrlicit x 3 doses.  Echo looks stable from old echo from [facility] back in 2023.  Daughter reports he seems to choke and cough with eating at times and this is new.  SLP has been consulted.    Past Medical History:   Diagnosis Date    Anticoagulant long-term use     Anxiety disorder, unspecified     Aphasia     CVA (cerebral vascular accident)     Depression     Diabetes mellitus     GERD (gastroesophageal reflux disease)     Hemiplegia     HTN (hypertension)     Mixed hyperlipidemia     Stroke     Unspecified atrial flutter        Past Surgical History:   Procedure Laterality Date    AMPUTATION, LOWER LIMB Left     BKA    CAROTID ENDARTERECTOMY Left     FRACTURE SURGERY      VASCULAR SURGERY         Review of patient's allergies indicates:  No Known Allergies    No current facility-administered medications on file prior to encounter.     Current Outpatient Medications on File Prior to Encounter   Medication Sig    amLODIPine (NORVASC) 2.5 MG tablet Take 10 mg by mouth once daily.    apixaban (ELIQUIS) 5 mg Tab Take 5 mg by mouth 2 (two) times daily.    clopidogreL (PLAVIX) 75 mg tablet Take 75 mg by mouth once daily.    empagliflozin (JARDIANCE) 10 mg tablet Take 10 mg by mouth once daily.    ergocalciferol (VITAMIN D2) 50,000 unit Cap Take 50,000 Units by mouth every Tues, Fri.    famotidine (PEPCID) 20 MG tablet Take 20 mg by mouth 2 (two) times daily.    fenofibrate 160 MG Tab Take 160 mg by mouth nightly.    folic acid (FOLVITE) 1 MG tablet Take 1 mg by mouth once daily.    furosemide (LASIX) 40 MG tablet Take 40 mg by mouth once daily.    gabapentin (NEURONTIN) 300 MG capsule Take 300 mg by mouth 3 (three) times daily.    HYDROcodone-acetaminophen (NORCO) 5-325 mg per tablet Take 1 tablet by mouth every 6 (six) hours as  needed for Pain.    lisinopriL (PRINIVIL,ZESTRIL) 40 MG tablet Take 40 mg by mouth once daily.    metFORMIN (GLUCOPHAGE) 1000 MG tablet Take 1,000 mg by mouth 2 (two) times daily with meals.    metoprolol tartrate (LOPRESSOR) 25 MG tablet Take 12.5 mg by mouth 2 (two) times daily.    rosuvastatin (CRESTOR) 20 MG tablet Take 20 mg by mouth every evening.    sertraline (ZOLOFT) 100 MG tablet Take 100 mg by mouth once daily.    traZODone (DESYREL) 50 MG tablet Take 50 mg by mouth every evening.    [DISCONTINUED] busPIRone (BUSPAR) 5 MG Tab Take 5 mg by mouth 2 (two) times daily.    [DISCONTINUED] hydrALAZINE (APRESOLINE) 25 MG tablet Take 25 mg by mouth 2 (two) times daily.     Family History    None       Tobacco Use    Smoking status: Former     Types: Cigarettes    Smokeless tobacco: Not on file   Substance and Sexual Activity    Alcohol use: Not Currently    Drug use: Not Currently    Sexual activity: Not on file     Review of Systems   Constitutional:  Positive for fatigue.   HENT: Negative.     Eyes: Negative.    Respiratory: Negative.     Cardiovascular: Negative.    Gastrointestinal: Negative.    Endocrine: Negative.    Genitourinary: Negative.    Musculoskeletal: Negative.    Skin: Negative.    Allergic/Immunologic: Negative.    Neurological: Negative.    Hematological: Negative.    Psychiatric/Behavioral: Negative.       Objective:     Vital Signs (Most Recent):  Temp: 98.6 °F (37 °C) (05/09/25 1100)  Pulse: 86 (05/09/25 1100)  Resp: 18 (05/09/25 1100)  BP: (!) 91/53 (05/09/25 1100)  SpO2: 95 % (05/09/25 1100) Vital Signs (24h Range):  Temp:  [97.7 °F (36.5 °C)-99.8 °F (37.7 °C)] 98.6 °F (37 °C)  Pulse:  [83-95] 86  Resp:  [18-20] 18  SpO2:  [92 %-95 %] 95 %  BP: ()/(43-54) 91/53     Weight: 124.6 kg (274 lb 12.8 oz)  Body mass index is 37.27 kg/m².     Physical Exam  Constitutional:       Appearance: Normal appearance. He is obese.   HENT:      Mouth/Throat:      Mouth: Mucous membranes are moist.  "  Eyes:      Extraocular Movements: Extraocular movements intact.      Conjunctiva/sclera: Conjunctivae normal.      Pupils: Pupils are equal, round, and reactive to light.   Cardiovascular:      Rate and Rhythm: Normal rate and regular rhythm.      Pulses: Normal pulses.      Heart sounds: Normal heart sounds.   Pulmonary:      Effort: Pulmonary effort is normal.      Breath sounds: Normal breath sounds.   Abdominal:      General: Abdomen is flat. Bowel sounds are normal.      Palpations: Abdomen is soft.   Musculoskeletal:      Cervical back: Normal range of motion and neck supple.      Right lower leg: Edema present.      Left lower leg: Edema present.      Comments: Left BKA   Neurological:      General: No focal deficit present.      Mental Status: He is alert and oriented to person, place, and time.   Psychiatric:         Mood and Affect: Mood normal.              CRANIAL NERVES     CN III, IV, VI   Pupils are equal, round, and reactive to light.       Significant Labs: All pertinent labs within the past 24 hours have been reviewed.  A1C:   Recent Labs   Lab 11/14/24  1115   HGBA1C 7.7*     ABGs: No results for input(s): "PH", "PCO2", "HCO3", "POCSATURATED", "BE", "TOTALHB", "COHB", "METHB", "O2HB", "POCFIO2", "PO2" in the last 48 hours.  Bilirubin:   Recent Labs   Lab 05/05/25  1809   BILITOT 0.4     Blood Culture: No results for input(s): "LABBLOO" in the last 48 hours.  BMP:   Recent Labs   Lab 05/09/25  0513   *      K 3.7      CO2 35*   BUN 19   CREATININE 0.91   CALCIUM 8.2*     CBC:   Recent Labs   Lab 05/08/25  0457 05/09/25  0824   WBC 6.74 6.94   HGB 7.7* 7.7*   HCT 27.3* 27.5*   * 126*     CMP:   Recent Labs   Lab 05/08/25  0457 05/09/25  0513    140   K 3.8 3.7    106   CO2 34* 35*   * 139*   BUN 19 19   CREATININE 1.04 0.91   CALCIUM 8.0* 8.2*     Cardiac Markers: No results for input(s): "CKMB", "MYOGLOBIN", "BNP", "TROPISTAT" in the last 48 " "hours.  Coagulation: No results for input(s): "PT", "INR", "APTT" in the last 48 hours.  Lactic Acid: No results for input(s): "LACTATE" in the last 48 hours.  Lipase: No results for input(s): "LIPASE" in the last 48 hours.  Lipid Panel: No results for input(s): "CHOL", "HDL", "LDLCALC", "TRIG", "CHOLHDL" in the last 48 hours.  Magnesium: No results for input(s): "MG" in the last 48 hours.  Pathology Results  (Last 10 years)                 23 1320  Specimen to Pathology Final result    Narrative:  Patient - KEEGAN BOOTH                -  Sex- M   Med Rec # - [id number]                        [facility]   The following is an electronic copy of report # [id number] from:    [facility]   [address] [place] [address]                          Phone [phone]   DIAGNOSIS:   2023      RBW:liseth   BLOOD VESSEL, LEFT CAROTID, ENDARTERECTOMY:   - PARTIALLY CALCIFIED ATHEROMATOUS PLAQUE.     _______________________________________________________________________   SPECIMEN AND SOURCE:   LEFT CAROTID PLAQUE   CLINICAL INFORMATION:   OCCLUSION AND STENOSIS OF LEFT CAROTID ARTERY   GROSS EXAMINATION:   2023  WO/WPL   Received in formalin, labeled with the patient's name, "Keegan Booth," medical record number, "left carotid plaque," is a firm portion    of tubular white-tan tissue, 5.4 cm in length and 0.6 to 1 cm in    diameter.  The lining is smooth, white-tan with focal firm white areas    and pale yellow calcifications within the wall.  Representative sections    submitted in 1A.     MICROSCOPIC DESCRIPTION:   Unless gross only is specified, the final diagnosis for each specimen is    based on a microscopic examination of representative sections of tissue    by digitally imaged slide(s), to include immunohistochemical and special    stains if performed.    CODE: 0   Pathologist: DEMAR Elizondo MD, FCAP (Electronic " "Signature)    03/13/2023 4:44 PM    [facility], [facility] * [address] *    [place] [id number]    Technical services performed at: [facility], [facility] * [address] [place] [address] * [place] [address]    Gross examination performed at: [facility], [facility] * [address] [place] [address] * [place] [address]              POCT Glucose:   Recent Labs   Lab 05/08/25 2015 05/09/25  0714 05/09/25  1121   POCTGLUCOSE 168* 184* 193*     Prealbumin: No results for input(s): "PREALBUMIN" in the last 48 hours.  Respiratory Culture: No results for input(s): "GSRESP", "RESPIRATORYC" in the last 48 hours.  Troponin: No results for input(s): "TROPONINI", "TROPONINIHS" in the last 48 hours.  TSH: No results for input(s): "TSH" in the last 4320 hours.  Urine Culture: No results for input(s): "LABURIN" in the last 48 hours.  Urine Studies: No results for input(s): "COLORU", "APPEARANCEUA", "PHUR", "SPECGRAV", "PROTEINUA", "GLUCUA", "KETONESU", "BILIRUBINUA", "OCCULTUA", "NITRITE", "UROBILINOGEN", "LEUKOCYTESUR", "RBCUA", "WBCUA", "BACTERIA", "SQUAMEPITHEL", "HYALINECASTS" in the last 48 hours.    Invalid input(s): "WRIGHTSUR"    Significant Imaging:       Assessment & Plan  Acute anemia    S/p 1 U PRBC 05/05/2025  Monitor H&H  Sutgeryfollowing  Ferrlicit x 3 doses  H&H has been stable    Coagulopathy  Continue to hold plavix and asa  S//p EGD/Colonoscopy    Obesity  Body mass index is 37.27 kg/m². Morbid obesity complicates all aspects of disease management from diagnostic modalities to treatment. Weight loss encouraged and health benefits explained to patient.       Thrombocytopenia  The likely etiology of thrombocytopenia is unknown. The patients 3 most recent labs are listed below.  Recent Labs     05/07/25  0825 05/08/25  0457 05/09/25  0824   * 131* 126*     Plan  - Will transfuse if platelet count is <50k (if undergoing surgical procedure or have active bleeding).  -      GI " bleed  Patient's hemorrhage is due to gastrointestinal bleed, this bleeding is associated with an anticoagulant, the anticoagulant is Select Anticoagulant(s): plavix, asa. Patients most recent Hgb, Hct, platelets, and INR are listed below.  Recent Labs     05/07/25 0825 05/08/25 0457 05/09/25 0824   HGB 7.9* 7.7* 7.7*   HCT 28.1* 27.3* 27.5*   * 131* 126*     Plan  - Will trend hemoglobin/hematocrit Every 6 hours  - Will monitor and correct any coagulation defects  - Will transfuse if Hgb is <7g/dl (<8g/dl in cases of active ACS) or if patient has rapid bleeding leading to hemodynamic instability  -  s/p 1 U PRBC  S/p egd and colonosocopy 05/07/2025  Acute blood loss anemia  Anemia is likely due to acute blood loss which was from GI and Iron deficiency. Most recent hemoglobin and hematocrit are listed below.  Recent Labs     05/07/25 0825 05/08/25 0457 05/09/25 0824   HGB 7.9* 7.7* 7.7*   HCT 28.1* 27.3* 27.5*     Plan  - Monitor serial CBC: Every 6 hours  - Transfuse PRBC if patient becomes hemodynamically unstable, symptomatic or H/H drops below 7/21.  - Patient has received 1 units of PRBCs on 05/05/2025  - Patient's anemia is currently stable  -  ferrlicit x 3 due to CL  Controlled type 2 diabetes mellitus, without long-term current use of insulin  Patient's FSGs are controlled on current medication regimen.  Last A1c reviewed-   Lab Results   Component Value Date    HGBA1C 7.7 (H) 11/14/2024     Most recent fingerstick glucose reviewed-   Recent Labs   Lab 05/08/25  1608 05/08/25  2015 05/09/25  0714 05/09/25  1121   POCTGLUCOSE 204* 168* 184* 193*     Current correctional scale  Low  Maintain anti-hyperglycemic dose as follows-   Antihyperglycemics (From admission, onward)      Start     Stop Route Frequency Ordered    05/06/25 0900  empagliflozin (Jardiance) tablet 10 mg        Question Answer Comment   Does this patient have a diagnosis of heart failure? Yes    Does this patient have type 1  diabetes or diabetic ketoacidosis? No    Does this patient have symptomatic hypotension? No    Is the patient NPO or pending major surgery in next 3 days or less? No        -- Oral Daily 05/05/25 1937 05/06/25 0746  insulin aspart U-100 pen 0-5 Units         -- SubQ Before meals & nightly PRN 05/06/25 0647          Hold Oral hypoglycemics while patient is in the hospital.      Primary hypertension  Patient's blood pressure range in the last 24 hours was: BP  Min: 82/54  Max: 103/53.The patient's inpatient anti-hypertensive regimen is listed below:  Current Antihypertensives  Will hold all BP meds today  Duodenal ulcer  Pepcid, protonix careafate  Check h pylori stool    Diverticulosis  On colonoscopy    Lipoma of colon  On colonoscopy  S/p removal    Colon polyps  On colonoscopy  S/p removal  F/u with Dr. Guzman for path    Hemorrhoid  On colonoscopy  F/u with Dr. Guzman post d/c    Dysphagia  Consult SLP    VTE Risk Mitigation (From admission, onward)           Ordered     IP VTE HIGH RISK PATIENT  Once         05/05/25 1937     Place sequential compression device  Until discontinued         05/05/25 1937     Reason for No Pharmacological VTE Prophylaxis  Once        Question:  Reasons:  Answer:  Active Bleeding    05/05/25 1937                    Discharge Planning   AVELINO: 5/12/2025     Code Status: Full Code   Medical Readiness for Discharge Date:   Discharge Plan A: Return to nursing home   Discharge Delays: None known at this time                    Paz Palm MD  Department of Hospital Medicine   [facility]/Surg

## 2025-05-09 NOTE — ASSESSMENT & PLAN NOTE
Patient's FSGs are controlled on current medication regimen.  Last A1c reviewed-   Lab Results   Component Value Date    HGBA1C 7.7 (H) 11/14/2024     Most recent fingerstick glucose reviewed-   Recent Labs   Lab 05/08/25  1608 05/08/25 2015 05/09/25  0714 05/09/25  1121   POCTGLUCOSE 204* 168* 184* 193*     Current correctional scale  Low  Maintain anti-hyperglycemic dose as follows-   Antihyperglycemics (From admission, onward)      Start     Stop Route Frequency Ordered    05/06/25 0900  empagliflozin (Jardiance) tablet 10 mg        Question Answer Comment   Does this patient have a diagnosis of heart failure? Yes    Does this patient have type 1 diabetes or diabetic ketoacidosis? No    Does this patient have symptomatic hypotension? No    Is the patient NPO or pending major surgery in next 3 days or less? No        -- Oral Daily 05/05/25 1937 05/06/25 0746  insulin aspart U-100 pen 0-5 Units         -- SubQ Before meals & nightly PRN 05/06/25 0647          Hold Oral hypoglycemics while patient is in the hospital.

## 2025-05-09 NOTE — PLAN OF CARE
Problem: Adult Inpatient Plan of Care  Goal: Plan of Care Review  Outcome: Progressing  Goal: Patient-Specific Goal (Individualized)  Outcome: Progressing  Goal: Absence of Hospital-Acquired Illness or Injury  Outcome: Progressing  Goal: Optimal Comfort and Wellbeing  Outcome: Progressing  Goal: Readiness for Transition of Care  Outcome: Progressing     Problem: Skin Injury Risk Increased  Goal: Skin Health and Integrity  Outcome: Progressing     Problem: Anemia  Goal: Anemia Symptom Improvement  Outcome: Progressing     Problem: Fall Injury Risk  Goal: Absence of Fall and Fall-Related Injury  Outcome: Progressing     Problem: Diabetes Comorbidity  Goal: Blood Glucose Level Within Targeted Range  Outcome: Progressing     Problem: Wound  Goal: Optimal Coping  Outcome: Progressing  Goal: Optimal Functional Ability  Outcome: Progressing  Goal: Absence of Infection Signs and Symptoms  Outcome: Progressing  Goal: Improved Oral Intake  Outcome: Progressing  Goal: Optimal Pain Control and Function  Outcome: Progressing  Goal: Skin Health and Integrity  Outcome: Progressing  Goal: Optimal Wound Healing  Outcome: Progressing

## 2025-05-10 LAB
ANION GAP SERPL CALC-SCNC: -1 MEQ/L (ref 2–13)
BUN SERPL-MCNC: 16 MG/DL (ref 7–20)
CALCIUM SERPL-MCNC: 8.7 MG/DL (ref 8.4–10.2)
CHLORIDE SERPL-SCNC: 106 MMOL/L (ref 98–110)
CO2 SERPL-SCNC: 36 MMOL/L (ref 21–32)
CREAT SERPL-MCNC: 0.69 MG/DL (ref 0.66–1.25)
CREAT/UREA NIT SERPL: 23 (ref 12–20)
GFR SERPLBLD CREATININE-BSD FMLA CKD-EPI: >90 ML/MIN/1.73/M2
GLUCOSE SERPL-MCNC: 154 MG/DL (ref 70–115)
POCT GLUCOSE: 189 MG/DL (ref 70–110)
POCT GLUCOSE: 196 MG/DL (ref 70–110)
POCT GLUCOSE: 205 MG/DL (ref 70–110)
POCT GLUCOSE: 321 MG/DL (ref 70–110)
POTASSIUM SERPL-SCNC: 3.9 MMOL/L (ref 3.5–5.1)
SODIUM SERPL-SCNC: 141 MMOL/L (ref 136–145)

## 2025-05-10 PROCEDURE — 25000003 PHARM REV CODE 250

## 2025-05-10 PROCEDURE — 11000001 HC ACUTE MED/SURG PRIVATE ROOM

## 2025-05-10 PROCEDURE — 36415 COLL VENOUS BLD VENIPUNCTURE: CPT | Performed by: FAMILY MEDICINE

## 2025-05-10 PROCEDURE — 80048 BASIC METABOLIC PNL TOTAL CA: CPT | Performed by: FAMILY MEDICINE

## 2025-05-10 PROCEDURE — 99900031 HC PATIENT EDUCATION (STAT)

## 2025-05-10 PROCEDURE — 27100171 HC OXYGEN HIGH FLOW UP TO 24 HOURS

## 2025-05-10 PROCEDURE — 94799 UNLISTED PULMONARY SVC/PX: CPT

## 2025-05-10 PROCEDURE — 99900035 HC TECH TIME PER 15 MIN (STAT)

## 2025-05-10 PROCEDURE — 27000221 HC OXYGEN, UP TO 24 HOURS

## 2025-05-10 PROCEDURE — 5A09357 ASSISTANCE WITH RESPIRATORY VENTILATION, LESS THAN 24 CONSECUTIVE HOURS, CONTINUOUS POSITIVE AIRWAY PRESSURE: ICD-10-PCS | Performed by: SURGERY

## 2025-05-10 PROCEDURE — 94660 CPAP INITIATION&MGMT: CPT

## 2025-05-10 PROCEDURE — 27000190 HC CPAP FULL FACE MASK W/VALVE

## 2025-05-10 PROCEDURE — 94761 N-INVAS EAR/PLS OXIMETRY MLT: CPT

## 2025-05-10 PROCEDURE — 25000003 PHARM REV CODE 250: Performed by: FAMILY MEDICINE

## 2025-05-10 RX ORDER — IRON,CARBONYL/ASCORBIC ACID 100-250 MG
1 TABLET ORAL DAILY
Status: DISCONTINUED | OUTPATIENT
Start: 2025-05-10 | End: 2025-05-12 | Stop reason: HOSPADM

## 2025-05-10 RX ADMIN — SUCRALFATE 1 G: 1 TABLET ORAL at 08:05

## 2025-05-10 RX ADMIN — ATORVASTATIN CALCIUM 80 MG: 40 TABLET, FILM COATED ORAL at 09:05

## 2025-05-10 RX ADMIN — PANTOPRAZOLE SODIUM 40 MG: 40 TABLET, DELAYED RELEASE ORAL at 09:05

## 2025-05-10 RX ADMIN — MICONAZOLE NITRATE 2 % TOPICAL POWDER: at 09:05

## 2025-05-10 RX ADMIN — SUCRALFATE 1 G: 1 TABLET ORAL at 11:05

## 2025-05-10 RX ADMIN — SUCRALFATE 1 G: 1 TABLET ORAL at 04:05

## 2025-05-10 RX ADMIN — GABAPENTIN 300 MG: 300 CAPSULE ORAL at 08:05

## 2025-05-10 RX ADMIN — SUCRALFATE 1 G: 1 TABLET ORAL at 05:05

## 2025-05-10 RX ADMIN — MICONAZOLE NITRATE 2 % TOPICAL POWDER: at 08:05

## 2025-05-10 RX ADMIN — FENOFIBRATE 145 MG: 145 TABLET ORAL at 09:05

## 2025-05-10 RX ADMIN — FOLIC ACID 1 MG: 1 TABLET ORAL at 09:05

## 2025-05-10 RX ADMIN — GABAPENTIN 300 MG: 300 CAPSULE ORAL at 03:05

## 2025-05-10 RX ADMIN — HYDROCODONE BITARTRATE AND ACETAMINOPHEN 1 TABLET: 5; 325 TABLET ORAL at 03:05

## 2025-05-10 RX ADMIN — GABAPENTIN 300 MG: 300 CAPSULE ORAL at 09:05

## 2025-05-10 RX ADMIN — INSULIN ASPART 2 UNITS: 100 INJECTION, SOLUTION INTRAVENOUS; SUBCUTANEOUS at 07:05

## 2025-05-10 RX ADMIN — HYDROCODONE BITARTRATE AND ACETAMINOPHEN 1 TABLET: 5; 325 TABLET ORAL at 10:05

## 2025-05-10 RX ADMIN — HYDROCODONE BITARTRATE AND ACETAMINOPHEN 1 TABLET: 5; 325 TABLET ORAL at 07:05

## 2025-05-10 RX ADMIN — TRAZODONE HYDROCHLORIDE 50 MG: 50 TABLET ORAL at 08:05

## 2025-05-10 RX ADMIN — EMPAGLIFLOZIN 10 MG: 10 TABLET, FILM COATED ORAL at 09:05

## 2025-05-10 RX ADMIN — SERTRALINE HYDROCHLORIDE 100 MG: 50 TABLET ORAL at 09:05

## 2025-05-10 RX ADMIN — INSULIN ASPART 4 UNITS: 100 INJECTION, SOLUTION INTRAVENOUS; SUBCUTANEOUS at 11:05

## 2025-05-10 RX ADMIN — FAMOTIDINE 20 MG: 20 TABLET, FILM COATED ORAL at 09:05

## 2025-05-10 RX ADMIN — Medication 1 TABLET: at 04:05

## 2025-05-10 RX ADMIN — FAMOTIDINE 20 MG: 20 TABLET, FILM COATED ORAL at 08:05

## 2025-05-10 NOTE — ASSESSMENT & PLAN NOTE
Anemia is likely due to acute blood loss which was from GI and Iron deficiency. Most recent hemoglobin and hematocrit are listed below.  Recent Labs     05/08/25  0457 05/09/25  0824   HGB 7.7* 7.7*   HCT 27.3* 27.5*     Plan  - Monitor serial CBC: Every 6 hours  - Transfuse PRBC if patient becomes hemodynamically unstable, symptomatic or H/H drops below 7/21.  - Patient has received 1 units of PRBCs on 05/05/2025  - Patient's anemia is currently stable  -  ferrlicit x 3 due to CL

## 2025-05-10 NOTE — ASSESSMENT & PLAN NOTE
The likely etiology of thrombocytopenia is unknown. The patients 3 most recent labs are listed below.  Recent Labs     05/08/25  0457 05/09/25  0824   * 126*     Plan  - Will transfuse if platelet count is <50k (if undergoing surgical procedure or have active bleeding).  -

## 2025-05-10 NOTE — SUBJECTIVE & OBJECTIVE
Review of Systems   Constitutional:  Positive for fatigue.   HENT: Negative.     Eyes: Negative.    Respiratory: Negative.     Cardiovascular: Negative.    Gastrointestinal: Negative.    Endocrine: Negative.    Genitourinary: Negative.    Musculoskeletal: Negative.    Skin: Negative.    Allergic/Immunologic: Negative.    Neurological: Negative.    Hematological: Negative.    Psychiatric/Behavioral: Negative.       Objective:     Vital Signs (Most Recent):  Temp: 98.2 °F (36.8 °C) (05/10/25 1100)  Pulse: 94 (05/10/25 1100)  Resp: 18 (05/10/25 1100)  BP: 110/69 (05/10/25 1100)  SpO2: 96 % (05/10/25 1100) Vital Signs (24h Range):  Temp:  [97.4 °F (36.3 °C)-98.6 °F (37 °C)] 98.2 °F (36.8 °C)  Pulse:  [] 94  Resp:  [18-20] 18  SpO2:  [87 %-96 %] 96 %  BP: (104-127)/(54-69) 110/69     Weight: 124.6 kg (274 lb 12.8 oz)  Body mass index is 37.27 kg/m².     Physical Exam  Vitals and nursing note reviewed. Exam conducted with a chaperone present.   Constitutional:       Appearance: Normal appearance. He is obese.   HENT:      Mouth/Throat:      Mouth: Mucous membranes are moist.   Eyes:      Extraocular Movements: Extraocular movements intact.      Conjunctiva/sclera: Conjunctivae normal.      Pupils: Pupils are equal, round, and reactive to light.   Cardiovascular:      Rate and Rhythm: Normal rate and regular rhythm.      Pulses: Normal pulses.      Heart sounds: Normal heart sounds.   Pulmonary:      Effort: Pulmonary effort is normal.      Breath sounds: Normal breath sounds.   Abdominal:      General: Abdomen is flat. Bowel sounds are normal.      Palpations: Abdomen is soft.   Musculoskeletal:      Cervical back: Normal range of motion and neck supple.      Right lower leg: Edema present.      Left lower leg: Edema present.      Comments: Left BKA   Neurological:      General: No focal deficit present.      Mental Status: He is alert and oriented to person, place, and time.   Psychiatric:         Mood and  "Affect: Mood normal.              CRANIAL NERVES     CN III, IV, VI   Pupils are equal, round, and reactive to light.       Significant Labs: All pertinent labs within the past 24 hours have been reviewed.  A1C:   Recent Labs   Lab 24  1115   HGBA1C 7.7*     ABGs: No results for input(s): "PH", "PCO2", "HCO3", "POCSATURATED", "BE", "TOTALHB", "COHB", "METHB", "O2HB", "POCFIO2", "PO2" in the last 48 hours.  Bilirubin:   Recent Labs   Lab 25  1809   BILITOT 0.4     Blood Culture: No results for input(s): "LABBLOO" in the last 48 hours.  BMP:   Recent Labs   Lab 05/10/25  0514   *      K 3.9      CO2 36*   BUN 16   CREATININE 0.69   CALCIUM 8.7     CBC:   Recent Labs   Lab 25  0824   WBC 6.94   HGB 7.7*   HCT 27.5*   *     CMP:   Recent Labs   Lab 25  0513 05/10/25  0514    141   K 3.7 3.9    106   CO2 35* 36*   * 154*   BUN 19 16   CREATININE 0.91 0.69   CALCIUM 8.2* 8.7     Cardiac Markers: No results for input(s): "CKMB", "MYOGLOBIN", "BNP", "TROPISTAT" in the last 48 hours.  Coagulation: No results for input(s): "PT", "INR", "APTT" in the last 48 hours.  Lactic Acid: No results for input(s): "LACTATE" in the last 48 hours.  Lipase: No results for input(s): "LIPASE" in the last 48 hours.  Lipid Panel: No results for input(s): "CHOL", "HDL", "LDLCALC", "TRIG", "CHOLHDL" in the last 48 hours.  Magnesium: No results for input(s): "MG" in the last 48 hours.  Pathology Results  (Last 10 years)                 23 1320  Specimen to Pathology Final result    Narrative:  Patient - KEEGAN SMITH                -  Sex- M   Med Rec # - [id number]                        Castillo Rosen   The following is an electronic copy of report # [id number] from:    [facility]   [address] [place] [address]                          Phone [phone]   DIAGNOSIS:   2023      RBW:liseth   BLOOD " "VESSEL, LEFT CAROTID, ENDARTERECTOMY:   - PARTIALLY CALCIFIED ATHEROMATOUS PLAQUE.     _______________________________________________________________________   SPECIMEN AND SOURCE:   LEFT CAROTID PLAQUE   CLINICAL INFORMATION:   OCCLUSION AND STENOSIS OF LEFT CAROTID ARTERY   GROSS EXAMINATION:   03/09/2023  WO/WPL   Received in formalin, labeled with the patient's name, "Onesimo Booth," medical record number, "left carotid plaque," is a firm portion    of tubular white-tan tissue, 5.4 cm in length and 0.6 to 1 cm in    diameter.  The lining is smooth, white-tan with focal firm white areas    and pale yellow calcifications within the wall.  Representative sections    submitted in 1A.     MICROSCOPIC DESCRIPTION:   Unless gross only is specified, the final diagnosis for each specimen is    based on a microscopic examination of representative sections of tissue    by digitally imaged slide(s), to include immunohistochemical and special    stains if performed.    CODE: 0   Pathologist: DEMAR Elizondo MD, FCAP (Electronic Signature)    03/13/2023 4:44 PM    [facility] * [address] *    [place] Sanchez[address]    Technical services performed at: [facility] * [address] [place] [address] * [place] [address]    Gross examination performed at: [facility] * [address] [place] [address] * [place] [address]              POCT Glucose:   Recent Labs   Lab 05/09/25  2019 05/10/25  0705 05/10/25  1037   POCTGLUCOSE 228* 205* 321*     Prealbumin: No results for input(s): "PREALBUMIN" in the last 48 hours.  Respiratory Culture: No results for input(s): "GSRESP", "RESPIRATORYC" in the last 48 hours.  Troponin: No results for input(s): "TROPONINI", "TROPONINIHS" in the last 48 hours.  TSH: No results for input(s): "TSH" in the last 4320 hours.  Urine Culture: No results for input(s): "LABURIN" in the last 48 hours.  Urine Studies: No results for " "input(s): "COLORU", "APPEARANCEUA", "PHUR", "SPECGRAV", "PROTEINUA", "GLUCUA", "KETONESU", "BILIRUBINUA", "OCCULTUA", "NITRITE", "UROBILINOGEN", "LEUKOCYTESUR", "RBCUA", "WBCUA", "BACTERIA", "SQUAMEPITHEL", "HYALINECASTS" in the last 48 hours.    Invalid input(s): "WRIGHTSUR"    Significant Imaging:   "

## 2025-05-10 NOTE — PROGRESS NOTES
[facility]/Surg  [facility] Medicine  Progress Note    Patient Name: Onesimo Booth  MRN: [id number]  Patient Class: IP- Inpatient   Admission Date: 5/5/2025  Length of Stay: 4 days  Attending Physician: Paz Palm MD  Primary Care Provider: Denys Mckeon III, MD        Subjective     Principal Problem:Acute blood loss anemia        HPI:  69-year-old male with hx of NIDDM, PVD Left BKA, HTN, Left [facility] resident of NH on plavix and Eliquis not sure why was sent for abnormal lab and generalize weakness.  He was found on a CBC earlier today to be anemic, with a hemoglobin of 6.5.  This is new for him.  Denies Hematochezia or melena, no endoscopy reported he is accompanied by his daughter at bedside. Pt is Poor historian. Surgery consulted for scope, Pt is getting one unit of PRBC and protonix per ED    Overview/Hospital Course:   05/06/2025 pt admitted with GI bleed.  S/p 1 U PRBCS and hgb at 7.5 stable last several draws.    05/07/2025 pt s/p EGD shows ulver of duodenum,.  Multiple polyps removed, lipoma and diverticuli and hemorrhoids. Pt H&H stable after 1 U and no c/o at present  05/08/2025 pt with GI bleed, some SOB not resolved with transfusion, still requires oxygen sats 89-90% on RA at rest.  Has not been on oxygen in the past, last echo [facility] 06/2024 EF was normal.  Pt denies SOB and denies chest pain, no cough or fever or WBC  CXR this am mild radha pulmonary edema, chronic RLE and RUE edema iproved since admit.    Will get echo and continue to wean oxygen as tolerated.  May need prn oxygen at the nursing home.  05/09/2025 admitted with GIB, still with SOB and requries oxygen, currently on 1-2L daughter says he dropped in the 70s while sleeping, does not hear him snoring, nurse documents sats in the 90s.  CXR did show some mild pulmonary edema, gave lasix yesterday, BP low this am reviewed nursing home mar and apparently they had stopped hydralazine the day he was sent here due to  lower BP there.  BP meds have been held offf and on last 2-3 days. Gave 500cc NS bolus this am, he is awake and alert at his mental status baseline.  Anemia is iron deficiency and he is received ferrlicit x 3 doses.  Echo looks stable from old echo from [facility] back in 2023.  Daughter reports he seems to choke and cough with eating at times and this is new.  SLP has been consulted.  05/10/2025 SLP plans for MBS on Monday, pt still with cough, nurse reports eating well no choking or coughin and he is swallowing his pills whole, sats are 97% on 2L, will wean oxygen.  VM placed at 0100 due to sats at 87%       Review of Systems   Constitutional:  Positive for fatigue.   HENT: Negative.     Eyes: Negative.    Respiratory: Negative.     Cardiovascular: Negative.    Gastrointestinal: Negative.    Endocrine: Negative.    Genitourinary: Negative.    Musculoskeletal: Negative.    Skin: Negative.    Allergic/Immunologic: Negative.    Neurological: Negative.    Hematological: Negative.    Psychiatric/Behavioral: Negative.       Objective:     Vital Signs (Most Recent):  Temp: 98.2 °F (36.8 °C) (05/10/25 1100)  Pulse: 94 (05/10/25 1100)  Resp: 18 (05/10/25 1100)  BP: 110/69 (05/10/25 1100)  SpO2: 96 % (05/10/25 1100) Vital Signs (24h Range):  Temp:  [97.4 °F (36.3 °C)-98.6 °F (37 °C)] 98.2 °F (36.8 °C)  Pulse:  [] 94  Resp:  [18-20] 18  SpO2:  [87 %-96 %] 96 %  BP: (104-127)/(54-69) 110/69     Weight: 124.6 kg (274 lb 12.8 oz)  Body mass index is 37.27 kg/m².     Physical Exam  Vitals and nursing note reviewed. Exam conducted with a chaperone present.   Constitutional:       Appearance: Normal appearance. He is obese.   HENT:      Mouth/Throat:      Mouth: Mucous membranes are moist.   Eyes:      Extraocular Movements: Extraocular movements intact.      Conjunctiva/sclera: Conjunctivae normal.      Pupils: Pupils are equal, round, and reactive to light.   Cardiovascular:      Rate and Rhythm: Normal rate and regular rhythm.  "     Pulses: Normal pulses.      Heart sounds: Normal heart sounds.   Pulmonary:      Effort: Pulmonary effort is normal.      Breath sounds: Normal breath sounds.   Abdominal:      General: Abdomen is flat. Bowel sounds are normal.      Palpations: Abdomen is soft.   Musculoskeletal:      Cervical back: Normal range of motion and neck supple.      Right lower leg: Edema present.      Left lower leg: Edema present.      Comments: Left BKA   Neurological:      General: No focal deficit present.      Mental Status: He is alert and oriented to person, place, and time.   Psychiatric:         Mood and Affect: Mood normal.              CRANIAL NERVES     CN III, IV, VI   Pupils are equal, round, and reactive to light.       Significant Labs: All pertinent labs within the past 24 hours have been reviewed.  A1C:   Recent Labs   Lab 11/14/24  1115   HGBA1C 7.7*     ABGs: No results for input(s): "PH", "PCO2", "HCO3", "POCSATURATED", "BE", "TOTALHB", "COHB", "METHB", "O2HB", "POCFIO2", "PO2" in the last 48 hours.  Bilirubin:   Recent Labs   Lab 05/05/25  1809   BILITOT 0.4     Blood Culture: No results for input(s): "LABBLOO" in the last 48 hours.  BMP:   Recent Labs   Lab 05/10/25  0514   *      K 3.9      CO2 36*   BUN 16   CREATININE 0.69   CALCIUM 8.7     CBC:   Recent Labs   Lab 05/09/25  0824   WBC 6.94   HGB 7.7*   HCT 27.5*   *     CMP:   Recent Labs   Lab 05/09/25  0513 05/10/25  0514    141   K 3.7 3.9    106   CO2 35* 36*   * 154*   BUN 19 16   CREATININE 0.91 0.69   CALCIUM 8.2* 8.7     Cardiac Markers: No results for input(s): "CKMB", "MYOGLOBIN", "BNP", "TROPISTAT" in the last 48 hours.  Coagulation: No results for input(s): "PT", "INR", "APTT" in the last 48 hours.  Lactic Acid: No results for input(s): "LACTATE" in the last 48 hours.  Lipase: No results for input(s): "LIPASE" in the last 48 hours.  Lipid Panel: No results for input(s): "CHOL", "HDL", "LDLCALC", " ""TRIG", "CHOLHDL" in the last 48 hours.  Magnesium: No results for input(s): "MG" in the last 48 hours.  Pathology Results  (Last 10 years)                 23 1320  Specimen to Pathology Final result    Narrative:  Patient - KEEGAN BOOTH                -  Sex- M   Med Rec # - [id number]                        Castillo Rosen   The following is an electronic copy of report # [id number] from:    [facility]   [address] [place] [address]                          Phone [phone]   DIAGNOSIS:   2023      RBW:liseth   BLOOD VESSEL, LEFT CAROTID, ENDARTERECTOMY:   - PARTIALLY CALCIFIED ATHEROMATOUS PLAQUE.     _______________________________________________________________________   SPECIMEN AND SOURCE:   LEFT CAROTID PLAQUE   CLINICAL INFORMATION:   OCCLUSION AND STENOSIS OF LEFT CAROTID ARTERY   GROSS EXAMINATION:   2023  WO/WPL   Received in formalin, labeled with the patient's name, "Keegan Booth," medical record number, "left carotid plaque," is a firm portion    of tubular white-tan tissue, 5.4 cm in length and 0.6 to 1 cm in    diameter.  The lining is smooth, white-tan with focal firm white areas    and pale yellow calcifications within the wall.  Representative sections    submitted in 1A.     MICROSCOPIC DESCRIPTION:   Unless gross only is specified, the final diagnosis for each specimen is    based on a microscopic examination of representative sections of tissue    by digitally imaged slide(s), to include immunohistochemical and special    stains if performed.    CODE: 0   Pathologist: DEMAR Elizondo MD, FCAP (Electronic Signature)    2023 4:44 PM    [facility] * [address] *    [place] [id number]    Technical services performed at: [facility] * [address] [place] [address] * [place] [address]    Gross examination performed at: [facility] * [id number]    [facility] * [place] [address]              POCT Glucose:   Recent Labs   Lab 05/09/25  2019 05/10/25  0705 05/10/25  1037   POCTGLUCOSE 228* 205* 321*     Prealbumin: No results for input(s): "PREALBUMIN" in the last 48 hours.  Respiratory Culture: No results for input(s): "GSRESP", "RESPIRATORYC" in the last 48 hours.  Troponin: No results for input(s): "TROPONINI", "TROPONINIHS" in the last 48 hours.  TSH: No results for input(s): "TSH" in the last 4320 hours.  Urine Culture: No results for input(s): "LABURIN" in the last 48 hours.  Urine Studies: No results for input(s): "COLORU", "APPEARANCEUA", "PHUR", "SPECGRAV", "PROTEINUA", "GLUCUA", "KETONESU", "BILIRUBINUA", "OCCULTUA", "NITRITE", "UROBILINOGEN", "LEUKOCYTESUR", "RBCUA", "WBCUA", "BACTERIA", "SQUAMEPITHEL", "HYALINECASTS" in the last 48 hours.    Invalid input(s): "WRIGHTSUR"    Significant Imaging:       Assessment & Plan  Acute anemia    S/p 1 U PRBC 05/05/2025  Monitor H&H  Sutgeryfollowing  Ferrlicit x 3 doses  H&H has been stable    Coagulopathy  Continue to hold plavix and asa  S//p EGD/Colonoscopy    Obesity  Body mass index is 37.27 kg/m². Morbid obesity complicates all aspects of disease management from diagnostic modalities to treatment. Weight loss encouraged and health benefits explained to patient.       Thrombocytopenia  The likely etiology of thrombocytopenia is unknown. The patients 3 most recent labs are listed below.  Recent Labs     05/08/25  0457 05/09/25  0824   * 126*     Plan  - Will transfuse if platelet count is <50k (if undergoing surgical procedure or have active bleeding).  -      GI bleed  Patient's hemorrhage is due to gastrointestinal bleed, this bleeding is associated with an anticoagulant, the anticoagulant is Select Anticoagulant(s): plavix, asa. Patients most recent Hgb, Hct, platelets, and INR are listed below.  Recent Labs     05/08/25  0457 05/09/25  0824   HGB 7.7* 7.7*   HCT 27.3* " 27.5*   * 126*     Plan  - Will trend hemoglobin/hematocrit Every 6 hours  - Will monitor and correct any coagulation defects  - Will transfuse if Hgb is <7g/dl (<8g/dl in cases of active ACS) or if patient has rapid bleeding leading to hemodynamic instability  -  s/p 1 U PRBC  S/p egd and colonosocopy 05/07/2025  Acute blood loss anemia  Anemia is likely due to acute blood loss which was from GI and Iron deficiency. Most recent hemoglobin and hematocrit are listed below.  Recent Labs     05/08/25  0457 05/09/25  0824   HGB 7.7* 7.7*   HCT 27.3* 27.5*     Plan  - Monitor serial CBC: Every 6 hours  - Transfuse PRBC if patient becomes hemodynamically unstable, symptomatic or H/H drops below 7/21.  - Patient has received 1 units of PRBCs on 05/05/2025  - Patient's anemia is currently stable  -  ferrlicit x 3 due to CL  Controlled type 2 diabetes mellitus, without long-term current use of insulin  Patient's FSGs are controlled on current medication regimen.  Last A1c reviewed-   Lab Results   Component Value Date    HGBA1C 7.7 (H) 11/14/2024     Most recent fingerstick glucose reviewed-   Recent Labs   Lab 05/09/25  1542 05/09/25  2019 05/10/25  0705 05/10/25  1037   POCTGLUCOSE 177* 228* 205* 321*     Current correctional scale  Low  Maintain anti-hyperglycemic dose as follows-   Antihyperglycemics (From admission, onward)      Start     Stop Route Frequency Ordered    05/06/25 0900  empagliflozin (Jardiance) tablet 10 mg        Question Answer Comment   Does this patient have a diagnosis of heart failure? Yes    Does this patient have type 1 diabetes or diabetic ketoacidosis? No    Does this patient have symptomatic hypotension? No    Is the patient NPO or pending major surgery in next 3 days or less? No        -- Oral Daily 05/05/25 1937    05/06/25 0746  insulin aspart U-100 pen 0-5 Units         -- SubQ Before meals & nightly PRN 05/06/25 0647          Hold Oral hypoglycemics while patient is in the  [facility].      Primary hypertension  Patient's blood pressure range in the last 24 hours was: BP  Min: 104/55  Max: 127/68.The patient's inpatient anti-hypertensive regimen is listed below:  Current Antihypertensives  Will hold all BP meds today  Duodenal ulcer  Pepcid, protonix careafate  Check h pylori stool    Diverticulosis  On colonoscopy    Lipoma of colon  On colonoscopy  S/p removal    Colon polyps  On colonoscopy  S/p removal  F/u with Dr. Guzman for path    Hemorrhoid  On colonoscopy  F/u with Dr. Guzman post d/c    Dysphagia  Consult SLP    VTE Risk Mitigation (From admission, onward)           Ordered     IP VTE HIGH RISK PATIENT  Once         05/05/25 1937     Place sequential compression device  Until discontinued         05/05/25 1937     Reason for No Pharmacological VTE Prophylaxis  Once        Question:  Reasons:  Answer:  Active Bleeding    05/05/25 1937                    Discharge Planning   AVELINO: 5/12/2025     Code Status: Full Code   Medical Readiness for Discharge Date:   Discharge Plan A: Return to nursing home   Discharge Delays: None known at this time                    Paz Palm MD  Department of Hospital Medicine   [facility]

## 2025-05-10 NOTE — ASSESSMENT & PLAN NOTE
Patient's blood pressure range in the last 24 hours was: BP  Min: 104/55  Max: 127/68.The patient's inpatient anti-hypertensive regimen is listed below:  Current Antihypertensives  Will hold all BP meds today

## 2025-05-10 NOTE — ASSESSMENT & PLAN NOTE
Patient's hemorrhage is due to gastrointestinal bleed, this bleeding is associated with an anticoagulant, the anticoagulant is Select Anticoagulant(s): plavix, asa. Patients most recent Hgb, Hct, platelets, and INR are listed below.  Recent Labs     05/08/25  0457 05/09/25  0824   HGB 7.7* 7.7*   HCT 27.3* 27.5*   * 126*     Plan  - Will trend hemoglobin/hematocrit Every 6 hours  - Will monitor and correct any coagulation defects  - Will transfuse if Hgb is <7g/dl (<8g/dl in cases of active ACS) or if patient has rapid bleeding leading to hemodynamic instability  -  s/p 1 U PRBC  S/p egd and colonosocopy 05/07/2025

## 2025-05-10 NOTE — ASSESSMENT & PLAN NOTE
Patient's FSGs are controlled on current medication regimen.  Last A1c reviewed-   Lab Results   Component Value Date    HGBA1C 7.7 (H) 11/14/2024     Most recent fingerstick glucose reviewed-   Recent Labs   Lab 05/09/25  1542 05/09/25  2019 05/10/25  0705 05/10/25  1037   POCTGLUCOSE 177* 228* 205* 321*     Current correctional scale  Low  Maintain anti-hyperglycemic dose as follows-   Antihyperglycemics (From admission, onward)      Start     Stop Route Frequency Ordered    05/06/25 0900  empagliflozin (Jardiance) tablet 10 mg        Question Answer Comment   Does this patient have a diagnosis of heart failure? Yes    Does this patient have type 1 diabetes or diabetic ketoacidosis? No    Does this patient have symptomatic hypotension? No    Is the patient NPO or pending major surgery in next 3 days or less? No        -- Oral Daily 05/05/25 1937    05/06/25 0746  insulin aspart U-100 pen 0-5 Units         -- SubQ Before meals & nightly PRN 05/06/25 0647          Hold Oral hypoglycemics while patient is in the hospital.

## 2025-05-11 LAB
ABS NEUT CALC (OHS): 5.91 X10(3)/MCL (ref 2.1–9.2)
ANION GAP SERPL CALC-SCNC: 0 MEQ/L (ref 2–13)
BUN SERPL-MCNC: 14 MG/DL (ref 7–20)
CALCIUM SERPL-MCNC: 8.5 MG/DL (ref 8.4–10.2)
CHLORIDE SERPL-SCNC: 106 MMOL/L (ref 98–110)
CO2 SERPL-SCNC: 33 MMOL/L (ref 21–32)
CREAT SERPL-MCNC: 0.63 MG/DL (ref 0.66–1.25)
CREAT/UREA NIT SERPL: 22 (ref 12–20)
EOSINOPHIL NFR BLD MANUAL: 0.07 X10(3)/MCL (ref 0–0.9)
EOSINOPHIL NFR BLD MANUAL: 1 % (ref 0–3)
ERYTHROCYTE [DISTWIDTH] IN BLOOD BY AUTOMATED COUNT: 19 %
GFR SERPLBLD CREATININE-BSD FMLA CKD-EPI: >90 ML/MIN/1.73/M2
GLUCOSE SERPL-MCNC: 139 MG/DL (ref 70–115)
HCT VFR BLD AUTO: 30.7 % (ref 36–52)
HGB BLD-MCNC: 8.2 G/DL (ref 13–18)
LYMPHOCYTES NFR BLD MANUAL: 0.7 X10(3)/MCL (ref 0.6–4.6)
LYMPHOCYTES NFR BLD MANUAL: 10 % (ref 20–55)
MCH RBC QN AUTO: 22.5 PG (ref 27–34)
MCHC RBC AUTO-ENTMCNC: 26.7 G/DL (ref 31–37)
MCV RBC AUTO: 84.3 FL (ref 79–99)
MONOCYTES NFR BLD MANUAL: 0.28 X10(3)/MCL (ref 0.1–1.3)
MONOCYTES NFR BLD MANUAL: 4 % (ref 0–10)
MYELOCYTES NFR BLD MANUAL: 1 %
NEUTROPHILS NFR BLD MANUAL: 82 % (ref 37–73)
NEUTS BAND NFR BLD MANUAL: 2 % (ref 0–5)
PLATELET # BLD AUTO: 141 X10(3)/MCL (ref 140–371)
PMV BLD AUTO: 10.5 FL (ref 9.4–12.4)
POCT GLUCOSE: 149 MG/DL (ref 70–110)
POCT GLUCOSE: 176 MG/DL (ref 70–110)
POCT GLUCOSE: 206 MG/DL (ref 70–110)
POCT GLUCOSE: 213 MG/DL (ref 70–110)
POTASSIUM SERPL-SCNC: 4 MMOL/L (ref 3.5–5.1)
RBC # BLD AUTO: 3.64 X10(6)/MCL (ref 4–6)
SODIUM SERPL-SCNC: 139 MMOL/L (ref 136–145)
WBC # BLD AUTO: 7.04 X10(3)/MCL (ref 4–11.5)

## 2025-05-11 PROCEDURE — 94761 N-INVAS EAR/PLS OXIMETRY MLT: CPT

## 2025-05-11 PROCEDURE — 94799 UNLISTED PULMONARY SVC/PX: CPT

## 2025-05-11 PROCEDURE — 25000003 PHARM REV CODE 250: Performed by: FAMILY MEDICINE

## 2025-05-11 PROCEDURE — 36415 COLL VENOUS BLD VENIPUNCTURE: CPT | Performed by: FAMILY MEDICINE

## 2025-05-11 PROCEDURE — 99900035 HC TECH TIME PER 15 MIN (STAT)

## 2025-05-11 PROCEDURE — 25000003 PHARM REV CODE 250

## 2025-05-11 PROCEDURE — 11000001 HC ACUTE MED/SURG PRIVATE ROOM

## 2025-05-11 PROCEDURE — 94660 CPAP INITIATION&MGMT: CPT

## 2025-05-11 PROCEDURE — 27100171 HC OXYGEN HIGH FLOW UP TO 24 HOURS

## 2025-05-11 PROCEDURE — 87338 HPYLORI STOOL AG IA: CPT | Performed by: FAMILY MEDICINE

## 2025-05-11 PROCEDURE — 85025 COMPLETE CBC W/AUTO DIFF WBC: CPT | Performed by: FAMILY MEDICINE

## 2025-05-11 PROCEDURE — 80048 BASIC METABOLIC PNL TOTAL CA: CPT | Performed by: FAMILY MEDICINE

## 2025-05-11 RX ORDER — ACETAMINOPHEN 325 MG/1
650 TABLET ORAL EVERY 6 HOURS PRN
Status: DISCONTINUED | OUTPATIENT
Start: 2025-05-11 | End: 2025-05-12 | Stop reason: HOSPADM

## 2025-05-11 RX ORDER — METOPROLOL TARTRATE 25 MG/1
12.5 TABLET ORAL 2 TIMES DAILY
Status: DISCONTINUED | OUTPATIENT
Start: 2025-05-11 | End: 2025-05-12 | Stop reason: HOSPADM

## 2025-05-11 RX ADMIN — FAMOTIDINE 20 MG: 20 TABLET, FILM COATED ORAL at 09:05

## 2025-05-11 RX ADMIN — MICONAZOLE NITRATE 2 % TOPICAL POWDER: at 09:05

## 2025-05-11 RX ADMIN — METOPROLOL TARTRATE 12.5 MG: 25 TABLET, FILM COATED ORAL at 12:05

## 2025-05-11 RX ADMIN — ACETAMINOPHEN 650 MG: 325 TABLET, FILM COATED ORAL at 03:05

## 2025-05-11 RX ADMIN — METOPROLOL TARTRATE 12.5 MG: 25 TABLET, FILM COATED ORAL at 09:05

## 2025-05-11 RX ADMIN — FOLIC ACID 1 MG: 1 TABLET ORAL at 09:05

## 2025-05-11 RX ADMIN — TRAZODONE HYDROCHLORIDE 50 MG: 50 TABLET ORAL at 09:05

## 2025-05-11 RX ADMIN — ATORVASTATIN CALCIUM 80 MG: 40 TABLET, FILM COATED ORAL at 09:05

## 2025-05-11 RX ADMIN — FENOFIBRATE 145 MG: 145 TABLET ORAL at 09:05

## 2025-05-11 RX ADMIN — Medication 1 TABLET: at 04:05

## 2025-05-11 RX ADMIN — HYDROCODONE BITARTRATE AND ACETAMINOPHEN 1 TABLET: 5; 325 TABLET ORAL at 03:05

## 2025-05-11 RX ADMIN — GABAPENTIN 300 MG: 300 CAPSULE ORAL at 09:05

## 2025-05-11 RX ADMIN — EMPAGLIFLOZIN 10 MG: 10 TABLET, FILM COATED ORAL at 09:05

## 2025-05-11 RX ADMIN — HYDROCODONE BITARTRATE AND ACETAMINOPHEN 1 TABLET: 5; 325 TABLET ORAL at 09:05

## 2025-05-11 RX ADMIN — PANTOPRAZOLE SODIUM 40 MG: 40 TABLET, DELAYED RELEASE ORAL at 09:05

## 2025-05-11 RX ADMIN — GABAPENTIN 300 MG: 300 CAPSULE ORAL at 03:05

## 2025-05-11 RX ADMIN — SUCRALFATE 1 G: 1 TABLET ORAL at 04:05

## 2025-05-11 RX ADMIN — HYDROCODONE BITARTRATE AND ACETAMINOPHEN 1 TABLET: 5; 325 TABLET ORAL at 06:05

## 2025-05-11 RX ADMIN — SUCRALFATE 1 G: 1 TABLET ORAL at 09:05

## 2025-05-11 RX ADMIN — SUCRALFATE 1 G: 1 TABLET ORAL at 06:05

## 2025-05-11 RX ADMIN — INSULIN ASPART 2 UNITS: 100 INJECTION, SOLUTION INTRAVENOUS; SUBCUTANEOUS at 11:05

## 2025-05-11 RX ADMIN — SUCRALFATE 1 G: 1 TABLET ORAL at 11:05

## 2025-05-11 RX ADMIN — SERTRALINE HYDROCHLORIDE 100 MG: 50 TABLET ORAL at 09:05

## 2025-05-11 RX ADMIN — INSULIN ASPART 1 UNITS: 100 INJECTION, SOLUTION INTRAVENOUS; SUBCUTANEOUS at 07:05

## 2025-05-11 NOTE — ASSESSMENT & PLAN NOTE
On colonoscopy  F/u with Dr. Guzman post d/c     Debridement Text: The wound edges were debrided prior to proceeding with the closure to facilitate wound healing.

## 2025-05-11 NOTE — ASSESSMENT & PLAN NOTE
The likely etiology of thrombocytopenia is unknown. The patients 3 most recent labs are listed below.  Recent Labs     05/09/25  0824 05/11/25  0421   * 141     Plan  - Will transfuse if platelet count is <50k (if undergoing surgical procedure or have active bleeding).  -

## 2025-05-11 NOTE — ASSESSMENT & PLAN NOTE
Patient's blood pressure range in the last 24 hours was: BP  Min: 98/52  Max: 123/78.The patient's inpatient anti-hypertensive regimen is listed below:  Current Antihypertensives  Resume metoprolol toaday

## 2025-05-11 NOTE — ASSESSMENT & PLAN NOTE
Patient's FSGs are controlled on current medication regimen.  Last A1c reviewed-   Lab Results   Component Value Date    HGBA1C 7.7 (H) 11/14/2024     Most recent fingerstick glucose reviewed-   Recent Labs   Lab 05/10/25  1707 05/10/25  2047 05/11/25  0730 05/11/25  1116   POCTGLUCOSE 196* 189* 149* 206*     Current correctional scale  Low  Maintain anti-hyperglycemic dose as follows-   Antihyperglycemics (From admission, onward)      Start     Stop Route Frequency Ordered    05/06/25 0900  empagliflozin (Jardiance) tablet 10 mg        Question Answer Comment   Does this patient have a diagnosis of heart failure? Yes    Does this patient have type 1 diabetes or diabetic ketoacidosis? No    Does this patient have symptomatic hypotension? No    Is the patient NPO or pending major surgery in next 3 days or less? No        -- Oral Daily 05/05/25 1937    05/06/25 0746  insulin aspart U-100 pen 0-5 Units         -- SubQ Before meals & nightly PRN 05/06/25 0647          Hold Oral hypoglycemics while patient is in the hospital.

## 2025-05-11 NOTE — PROGRESS NOTES
[facility]/Surg  [facility] Medicine  Progress Note    Patient Name: Onesimo Booth  MRN: [id number]  Patient Class: IP- Inpatient   Admission Date: 5/5/2025  Length of Stay: 5 days  Attending Physician: Paz Palm MD  Primary Care Provider: Denys Mckeon III, MD        Subjective     Principal Problem:Acute blood loss anemia        HPI:  69-year-old male with hx of NIDDM, PVD Left BKA, HTN, Left [facility] resident of NH on plavix and Eliquis not sure why was sent for abnormal lab and generalize weakness.  He was found on a CBC earlier today to be anemic, with a hemoglobin of 6.5.  This is new for him.  Denies Hematochezia or melena, no endoscopy reported he is accompanied by his daughter at bedside. Pt is Poor historian. Surgery consulted for scope, Pt is getting one unit of PRBC and protonix per ED    Overview/Hospital Course:   05/06/2025 pt admitted with GI bleed.  S/p 1 U PRBCS and hgb at 7.5 stable last several draws.    05/07/2025 pt s/p EGD shows ulver of duodenum,.  Multiple polyps removed, lipoma and diverticuli and hemorrhoids. Pt H&H stable after 1 U and no c/o at present  05/08/2025 pt with GI bleed, some SOB not resolved with transfusion, still requires oxygen sats 89-90% on RA at rest.  Has not been on oxygen in the past, last echo [facility] 06/2024 EF was normal.  Pt denies SOB and denies chest pain, no cough or fever or WBC  CXR this am mild radha pulmonary edema, chronic RLE and RUE edema iproved since admit.    Will get echo and continue to wean oxygen as tolerated.  May need prn oxygen at the nursing home.  05/09/2025 admitted with GIB, still with SOB and requries oxygen, currently on 1-2L daughter says he dropped in the 70s while sleeping, does not hear him snoring, nurse documents sats in the 90s.  CXR did show some mild pulmonary edema, gave lasix yesterday, BP low this am reviewed nursing home mar and apparently they had stopped hydralazine the day he was sent here due to  lower BP there.  BP meds have been held offf and on last 2-3 days. Gave 500cc NS bolus this am, he is awake and alert at his mental status baseline.  Anemia is iron deficiency and he is received ferrlicit x 3 doses.  Echo looks stable from old echo from [facility] back in 2023.  Daughter reports he seems to choke and cough with eating at times and this is new.  SLP has been consulted.  05/10/2025 SLP plans for MBS on Monday, pt still with cough, nurse reports eating well no choking or coughin and he is swallowing his pills whole, sats are 97% on 2L, will wean oxygen.  VM placed at 0100 due to sats at 87%  05/11/2025 tolerated BIPAP last night and no desaturations,tolerating diet with no choking.  Awaiting MBS for Monday no bedside evidence of dysphagia.  Other labs stable.       Review of Systems   Constitutional:  Positive for fatigue.   HENT: Negative.     Eyes: Negative.    Respiratory: Negative.     Cardiovascular: Negative.    Gastrointestinal: Negative.    Endocrine: Negative.    Genitourinary: Negative.    Musculoskeletal: Negative.    Skin: Negative.    Allergic/Immunologic: Negative.    Neurological: Negative.    Hematological: Negative.    Psychiatric/Behavioral: Negative.       Objective:     Vital Signs (Most Recent):  Temp: 99.5 °F (37.5 °C) (05/11/25 1100)  Pulse: 102 (05/11/25 1100)  Resp: 20 (05/11/25 1100)  BP: (!) 119/57 (05/11/25 1100)  SpO2: 96 % (05/11/25 1100) Vital Signs (24h Range):  Temp:  [97.2 °F (36.2 °C)-99.5 °F (37.5 °C)] 99.5 °F (37.5 °C)  Pulse:  [] 102  Resp:  [14-20] 20  SpO2:  [91 %-98 %] 96 %  BP: ()/(52-83) 119/57     Weight: 124.6 kg (274 lb 12.8 oz)  Body mass index is 37.27 kg/m².     Physical Exam  Vitals and nursing note reviewed. Exam conducted with a chaperone present.   Constitutional:       Appearance: Normal appearance. He is obese.   HENT:      Mouth/Throat:      Mouth: Mucous membranes are moist.   Eyes:      Extraocular Movements: Extraocular movements intact.  "     Conjunctiva/sclera: Conjunctivae normal.      Pupils: Pupils are equal, round, and reactive to light.   Cardiovascular:      Rate and Rhythm: Normal rate and regular rhythm.      Pulses: Normal pulses.      Heart sounds: Normal heart sounds.   Pulmonary:      Effort: Pulmonary effort is normal.      Breath sounds: Normal breath sounds.   Abdominal:      General: Abdomen is flat. Bowel sounds are normal.      Palpations: Abdomen is soft.   Musculoskeletal:      Cervical back: Normal range of motion and neck supple.      Right lower leg: Edema present.      Left lower leg: Edema present.      Comments: Left BKA   Neurological:      General: No focal deficit present.      Mental Status: He is alert and oriented to person, place, and time.   Psychiatric:         Mood and Affect: Mood normal.              CRANIAL NERVES     CN III, IV, VI   Pupils are equal, round, and reactive to light.       Significant Labs: All pertinent labs within the past 24 hours have been reviewed.  A1C:   Recent Labs   Lab 11/14/24  1115   HGBA1C 7.7*     ABGs: No results for input(s): "PH", "PCO2", "HCO3", "POCSATURATED", "BE", "TOTALHB", "COHB", "METHB", "O2HB", "POCFIO2", "PO2" in the last 48 hours.  Bilirubin:   Recent Labs   Lab 05/05/25  1809   BILITOT 0.4     Blood Culture: No results for input(s): "LABBLOO" in the last 48 hours.  BMP:   Recent Labs   Lab 05/11/25  0431   *      K 4.0      CO2 33*   BUN 14   CREATININE 0.63*   CALCIUM 8.5     CBC:   Recent Labs   Lab 05/11/25  0421   WBC 7.04   HGB 8.2*   HCT 30.7*        CMP:   Recent Labs   Lab 05/10/25  0514 05/11/25  0431    139   K 3.9 4.0    106   CO2 36* 33*   * 139*   BUN 16 14   CREATININE 0.69 0.63*   CALCIUM 8.7 8.5     Cardiac Markers: No results for input(s): "CKMB", "MYOGLOBIN", "BNP", "TROPISTAT" in the last 48 hours.  Coagulation: No results for input(s): "PT", "INR", "APTT" in the last 48 hours.  Lactic Acid: No results " "for input(s): "LACTATE" in the last 48 hours.  Lipase: No results for input(s): "LIPASE" in the last 48 hours.  Lipid Panel: No results for input(s): "CHOL", "HDL", "LDLCALC", "TRIG", "CHOLHDL" in the last 48 hours.  Magnesium: No results for input(s): "MG" in the last 48 hours.  Pathology Results  (Last 10 years)                 23 1320  Specimen to Pathology Final result    Narrative:  Patient - KEEGAN BOOTH                -  Sex- M   Med Rec # - [id number]                        Castillo Rosen   The following is an electronic copy of report # [id number] from:    [facility]   [address] [place] [address]                          Phone [phone]   DIAGNOSIS:   2023      RBW:liseth   BLOOD VESSEL, LEFT CAROTID, ENDARTERECTOMY:   - PARTIALLY CALCIFIED ATHEROMATOUS PLAQUE.     _______________________________________________________________________   SPECIMEN AND SOURCE:   LEFT CAROTID PLAQUE   CLINICAL INFORMATION:   OCCLUSION AND STENOSIS OF LEFT CAROTID ARTERY   GROSS EXAMINATION:   2023  WO/WPL   Received in formalin, labeled with the patient's name, "Keegan Botoh," medical record number, "left carotid plaque," is a firm portion    of tubular white-tan tissue, 5.4 cm in length and 0.6 to 1 cm in    diameter.  The lining is smooth, white-tan with focal firm white areas    and pale yellow calcifications within the wall.  Representative sections    submitted in 1A.     MICROSCOPIC DESCRIPTION:   Unless gross only is specified, the final diagnosis for each specimen is    based on a microscopic examination of representative sections of tissue    by digitally imaged slide(s), to include immunohistochemical and special    stains if performed.    CODE: 0   Pathologist: DEMAR Elizondo MD, FCAP (Electronic Signature)    2023 4:44 PM    [facility] * [address] *    [place] " "[id number]    Technical services performed at: [facility], [facility] * [address]    [facility] * [place] [address]    Gross examination performed at: [facility], [facility] * [address] [place] [address] * [place] [address]              POCT Glucose:   Recent Labs   Lab 05/10/25  2047 05/11/25  0730 05/11/25  1116   POCTGLUCOSE 189* 149* 206*     Prealbumin: No results for input(s): "PREALBUMIN" in the last 48 hours.  Respiratory Culture: No results for input(s): "GSRESP", "RESPIRATORYC" in the last 48 hours.  Troponin: No results for input(s): "TROPONINI", "TROPONINIHS" in the last 48 hours.  TSH: No results for input(s): "TSH" in the last 4320 hours.  Urine Culture: No results for input(s): "LABURIN" in the last 48 hours.  Urine Studies: No results for input(s): "COLORU", "APPEARANCEUA", "PHUR", "SPECGRAV", "PROTEINUA", "GLUCUA", "KETONESU", "BILIRUBINUA", "OCCULTUA", "NITRITE", "UROBILINOGEN", "LEUKOCYTESUR", "RBCUA", "WBCUA", "BACTERIA", "SQUAMEPITHEL", "HYALINECASTS" in the last 48 hours.    Invalid input(s): "WRIGHTSUR"    Significant Imaging:       Assessment & Plan  Acute anemia    S/p 1 U PRBC 05/05/2025  Monitor H&H  Sutgeryfollowing  Ferrlicit x 3 doses  H&H has been stable    Coagulopathy  Continue to hold plavix and asa  S//p EGD/Colonoscopy    Obesity  Body mass index is 37.27 kg/m². Morbid obesity complicates all aspects of disease management from diagnostic modalities to treatment. Weight loss encouraged and health benefits explained to patient.       Thrombocytopenia  The likely etiology of thrombocytopenia is unknown. The patients 3 most recent labs are listed below.  Recent Labs     05/09/25  0824 05/11/25  0421   * 141     Plan  - Will transfuse if platelet count is <50k (if undergoing surgical procedure or have active bleeding).  -      GI bleed  Patient's hemorrhage is due to gastrointestinal bleed, this bleeding is associated with an anticoagulant, the anticoagulant is Select " Anticoagulant(s): plavix, asa. Patients most recent Hgb, Hct, platelets, and INR are listed below.  Recent Labs     05/09/25  0824 05/11/25  0421   HGB 7.7* 8.2*   HCT 27.5* 30.7*   * 141     Plan  - Will trend hemoglobin/hematocrit Every 6 hours  - Will monitor and correct any coagulation defects  - Will transfuse if Hgb is <7g/dl (<8g/dl in cases of active ACS) or if patient has rapid bleeding leading to hemodynamic instability  -  s/p 1 U PRBC  S/p egd and colonosocopy 05/07/2025  Acute blood loss anemia  Anemia is likely due to acute blood loss which was from GI and Iron deficiency. Most recent hemoglobin and hematocrit are listed below.  Recent Labs     05/09/25  0824 05/11/25  0421   HGB 7.7* 8.2*   HCT 27.5* 30.7*     Plan  - Monitor serial CBC: Every 6 hours  - Transfuse PRBC if patient becomes hemodynamically unstable, symptomatic or H/H drops below 7/21.  - Patient has received 1 units of PRBCs on 05/05/2025  - Patient's anemia is currently stable  -  ferrlicit x 3 due to CL  Controlled type 2 diabetes mellitus, without long-term current use of insulin  Patient's FSGs are controlled on current medication regimen.  Last A1c reviewed-   Lab Results   Component Value Date    HGBA1C 7.7 (H) 11/14/2024     Most recent fingerstick glucose reviewed-   Recent Labs   Lab 05/10/25  1707 05/10/25  2047 05/11/25  0730 05/11/25  1116   POCTGLUCOSE 196* 189* 149* 206*     Current correctional scale  Low  Maintain anti-hyperglycemic dose as follows-   Antihyperglycemics (From admission, onward)      Start     Stop Route Frequency Ordered    05/06/25 0900  empagliflozin (Jardiance) tablet 10 mg        Question Answer Comment   Does this patient have a diagnosis of heart failure? Yes    Does this patient have type 1 diabetes or diabetic ketoacidosis? No    Does this patient have symptomatic hypotension? No    Is the patient NPO or pending major surgery in next 3 days or less? No        -- Oral Daily 05/05/25 1937     05/06/25 0746  insulin aspart U-100 pen 0-5 Units         -- SubQ Before meals & nightly PRN 05/06/25 0647          Hold Oral hypoglycemics while patient is in the hospital.      Primary hypertension  Patient's blood pressure range in the last 24 hours was: BP  Min: 98/52  Max: 123/78.The patient's inpatient anti-hypertensive regimen is listed below:  Current Antihypertensives  Resume metoprolol toaday  Duodenal ulcer  Pepcid, protonix careafate  Check h pylori stool collected this am result is pending    Diverticulosis  On colonoscopy    Lipoma of colon  On colonoscopy  S/p removal    Colon polyps  On colonoscopy  S/p removal  F/u with Dr. Guzman for path    Hemorrhoid  On colonoscopy  F/u with Dr. Guzman post d/c    Dysphagia  Consult SLP    VTE Risk Mitigation (From admission, onward)           Ordered     IP VTE HIGH RISK PATIENT  Once         05/05/25 1937     Place sequential compression device  Until discontinued         05/05/25 1937     Reason for No Pharmacological VTE Prophylaxis  Once        Question:  Reasons:  Answer:  Active Bleeding    05/05/25 1937                    Discharge Planning   AVELINO: 5/12/2025     Code Status: Full Code   Medical Readiness for Discharge Date:   Discharge Plan A: Return to nursing home   Discharge Delays: None known at this time                    Paz Palm MD  Department of Hospital Medicine   [facility]/Surg

## 2025-05-11 NOTE — ASSESSMENT & PLAN NOTE
Anemia is likely due to acute blood loss which was from GI and Iron deficiency. Most recent hemoglobin and hematocrit are listed below.  Recent Labs     05/09/25  0824 05/11/25  0421   HGB 7.7* 8.2*   HCT 27.5* 30.7*     Plan  - Monitor serial CBC: Every 6 hours  - Transfuse PRBC if patient becomes hemodynamically unstable, symptomatic or H/H drops below 7/21.  - Patient has received 1 units of PRBCs on 05/05/2025  - Patient's anemia is currently stable  -  ferrlicit x 3 due to CL

## 2025-05-11 NOTE — PLAN OF CARE
Problem: Adult Inpatient Plan of Care  Goal: Plan of Care Review  Outcome: Progressing  Goal: Patient-Specific Goal (Individualized)  Outcome: Progressing  Goal: Absence of Hospital-Acquired Illness or Injury  Outcome: Progressing  Goal: Optimal Comfort and Wellbeing  Outcome: Progressing  Goal: Readiness for Transition of Care  Outcome: Progressing     Problem: Skin Injury Risk Increased  Goal: Skin Health and Integrity  Outcome: Progressing     Problem: Anemia  Goal: Anemia Symptom Improvement  Outcome: Progressing     Problem: Fall Injury Risk  Goal: Absence of Fall and Fall-Related Injury  Outcome: Progressing     Problem: Diabetes Comorbidity  Goal: Blood Glucose Level Within Targeted Range  Outcome: Progressing     Problem: Wound  Goal: Optimal Coping  Outcome: Progressing  Goal: Optimal Functional Ability  Outcome: Progressing  Goal: Absence of Infection Signs and Symptoms  Outcome: Progressing  Goal: Improved Oral Intake  Outcome: Progressing  Goal: Optimal Pain Control and Function  Outcome: Progressing  Goal: Skin Health and Integrity  Outcome: Progressing  Goal: Optimal Wound Healing  Outcome: Progressing     Problem: Bleeding Risk or Actual (Thrombocytopenia)  Goal: Absence of Bleeding  Outcome: Progressing     Problem: Gastrointestinal Bleeding  Goal: Optimal Coping with Acute Illness  Outcome: Progressing  Goal: Hemostasis  Outcome: Progressing

## 2025-05-11 NOTE — ASSESSMENT & PLAN NOTE
Patient's hemorrhage is due to gastrointestinal bleed, this bleeding is associated with an anticoagulant, the anticoagulant is Select Anticoagulant(s): plavix, asa. Patients most recent Hgb, Hct, platelets, and INR are listed below.  Recent Labs     05/09/25  0824 05/11/25  0421   HGB 7.7* 8.2*   HCT 27.5* 30.7*   * 141     Plan  - Will trend hemoglobin/hematocrit Every 6 hours  - Will monitor and correct any coagulation defects  - Will transfuse if Hgb is <7g/dl (<8g/dl in cases of active ACS) or if patient has rapid bleeding leading to hemodynamic instability  -  s/p 1 U PRBC  S/p egd and colonosocopy 05/07/2025

## 2025-05-11 NOTE — SUBJECTIVE & OBJECTIVE
Review of Systems   Constitutional:  Positive for fatigue.   HENT: Negative.     Eyes: Negative.    Respiratory: Negative.     Cardiovascular: Negative.    Gastrointestinal: Negative.    Endocrine: Negative.    Genitourinary: Negative.    Musculoskeletal: Negative.    Skin: Negative.    Allergic/Immunologic: Negative.    Neurological: Negative.    Hematological: Negative.    Psychiatric/Behavioral: Negative.       Objective:     Vital Signs (Most Recent):  Temp: 99.5 °F (37.5 °C) (05/11/25 1100)  Pulse: 102 (05/11/25 1100)  Resp: 20 (05/11/25 1100)  BP: (!) 119/57 (05/11/25 1100)  SpO2: 96 % (05/11/25 1100) Vital Signs (24h Range):  Temp:  [97.2 °F (36.2 °C)-99.5 °F (37.5 °C)] 99.5 °F (37.5 °C)  Pulse:  [] 102  Resp:  [14-20] 20  SpO2:  [91 %-98 %] 96 %  BP: ()/(52-83) 119/57     Weight: 124.6 kg (274 lb 12.8 oz)  Body mass index is 37.27 kg/m².     Physical Exam  Vitals and nursing note reviewed. Exam conducted with a chaperone present.   Constitutional:       Appearance: Normal appearance. He is obese.   HENT:      Mouth/Throat:      Mouth: Mucous membranes are moist.   Eyes:      Extraocular Movements: Extraocular movements intact.      Conjunctiva/sclera: Conjunctivae normal.      Pupils: Pupils are equal, round, and reactive to light.   Cardiovascular:      Rate and Rhythm: Normal rate and regular rhythm.      Pulses: Normal pulses.      Heart sounds: Normal heart sounds.   Pulmonary:      Effort: Pulmonary effort is normal.      Breath sounds: Normal breath sounds.   Abdominal:      General: Abdomen is flat. Bowel sounds are normal.      Palpations: Abdomen is soft.   Musculoskeletal:      Cervical back: Normal range of motion and neck supple.      Right lower leg: Edema present.      Left lower leg: Edema present.      Comments: Left BKA   Neurological:      General: No focal deficit present.      Mental Status: He is alert and oriented to person, place, and time.   Psychiatric:         Mood  "and Affect: Mood normal.              CRANIAL NERVES     CN III, IV, VI   Pupils are equal, round, and reactive to light.       Significant Labs: All pertinent labs within the past 24 hours have been reviewed.  A1C:   Recent Labs   Lab 24  1115   HGBA1C 7.7*     ABGs: No results for input(s): "PH", "PCO2", "HCO3", "POCSATURATED", "BE", "TOTALHB", "COHB", "METHB", "O2HB", "POCFIO2", "PO2" in the last 48 hours.  Bilirubin:   Recent Labs   Lab 25  1809   BILITOT 0.4     Blood Culture: No results for input(s): "LABBLOO" in the last 48 hours.  BMP:   Recent Labs   Lab 25  0431   *      K 4.0      CO2 33*   BUN 14   CREATININE 0.63*   CALCIUM 8.5     CBC:   Recent Labs   Lab 25  0421   WBC 7.04   HGB 8.2*   HCT 30.7*        CMP:   Recent Labs   Lab 05/10/25  0514 25  0431    139   K 3.9 4.0    106   CO2 36* 33*   * 139*   BUN 16 14   CREATININE 0.69 0.63*   CALCIUM 8.7 8.5     Cardiac Markers: No results for input(s): "CKMB", "MYOGLOBIN", "BNP", "TROPISTAT" in the last 48 hours.  Coagulation: No results for input(s): "PT", "INR", "APTT" in the last 48 hours.  Lactic Acid: No results for input(s): "LACTATE" in the last 48 hours.  Lipase: No results for input(s): "LIPASE" in the last 48 hours.  Lipid Panel: No results for input(s): "CHOL", "HDL", "LDLCALC", "TRIG", "CHOLHDL" in the last 48 hours.  Magnesium: No results for input(s): "MG" in the last 48 hours.  Pathology Results  (Last 10 years)                 23 1320  Specimen to Pathology Final result    Narrative:  Patient - KEEGAN SMITH                -  Sex- M   Med Rec # - [id number]                        Castillo Rosen   The following is an electronic copy of report # [id number] from:    [facility]   [address] [place] [address]                          Phone [phone]   DIAGNOSIS:   2023      RBW:liseth " "  BLOOD VESSEL, LEFT CAROTID, ENDARTERECTOMY:   - PARTIALLY CALCIFIED ATHEROMATOUS PLAQUE.     _______________________________________________________________________   SPECIMEN AND SOURCE:   LEFT CAROTID PLAQUE   CLINICAL INFORMATION:   OCCLUSION AND STENOSIS OF LEFT CAROTID ARTERY   GROSS EXAMINATION:   03/09/2023  WO/WPL   Received in formalin, labeled with the patient's name, "Onesimo Booth," medical record number, "left carotid plaque," is a firm portion    of tubular white-tan tissue, 5.4 cm in length and 0.6 to 1 cm in    diameter.  The lining is smooth, white-tan with focal firm white areas    and pale yellow calcifications within the wall.  Representative sections    submitted in 1A.     MICROSCOPIC DESCRIPTION:   Unless gross only is specified, the final diagnosis for each specimen is    based on a microscopic examination of representative sections of tissue    by digitally imaged slide(s), to include immunohistochemical and special    stains if performed.    CODE: 0   Pathologist: DEMAR Elizondo MD, FCAP (Electronic Signature)    03/13/2023 4:44 PM    [facility] * [address] *    [place] [address]    Technical services performed at: [facility], [facility] * [address] [place] [address] * [place] [address]    Gross examination performed at: [facility] * [address] [place] [address] * [place] [address]              POCT Glucose:   Recent Labs   Lab 05/10/25  2047 05/11/25  0730 05/11/25  1116   POCTGLUCOSE 189* 149* 206*     Prealbumin: No results for input(s): "PREALBUMIN" in the last 48 hours.  Respiratory Culture: No results for input(s): "GSRESP", "RESPIRATORYC" in the last 48 hours.  Troponin: No results for input(s): "TROPONINI", "TROPONINIHS" in the last 48 hours.  TSH: No results for input(s): "TSH" in the last 4320 hours.  Urine Culture: No results for input(s): "LABURIN" in the last 48 hours.  Urine Studies: No results " "for input(s): "COLORU", "APPEARANCEUA", "PHUR", "SPECGRAV", "PROTEINUA", "GLUCUA", "KETONESU", "BILIRUBINUA", "OCCULTUA", "NITRITE", "UROBILINOGEN", "LEUKOCYTESUR", "RBCUA", "WBCUA", "BACTERIA", "SQUAMEPITHEL", "HYALINECASTS" in the last 48 hours.    Invalid input(s): "WRIGHTSUR"    Significant Imaging:   "

## 2025-05-11 NOTE — PLAN OF CARE
Problem: Adult Inpatient Plan of Care  Goal: Plan of Care Review  Outcome: Progressing  Goal: Absence of Hospital-Acquired Illness or Injury  Outcome: Progressing  Goal: Optimal Comfort and Wellbeing  Outcome: Progressing  Goal: Readiness for Transition of Care  Outcome: Progressing     Problem: Skin Injury Risk Increased  Goal: Skin Health and Integrity  Outcome: Progressing     Problem: Anemia  Goal: Anemia Symptom Improvement  Outcome: Progressing     Problem: Fall Injury Risk  Goal: Absence of Fall and Fall-Related Injury  Outcome: Progressing     Problem: Diabetes Comorbidity  Goal: Blood Glucose Level Within Targeted Range  Outcome: Progressing     Problem: Wound  Goal: Optimal Coping  Outcome: Progressing  Goal: Optimal Functional Ability  Outcome: Progressing  Goal: Absence of Infection Signs and Symptoms  Outcome: Progressing  Goal: Improved Oral Intake  Outcome: Progressing  Goal: Optimal Pain Control and Function  Outcome: Progressing  Goal: Skin Health and Integrity  Outcome: Progressing  Goal: Optimal Wound Healing  Outcome: Progressing     Problem: Bleeding Risk or Actual (Thrombocytopenia)  Goal: Absence of Bleeding  Outcome: Progressing     Problem: Gastrointestinal Bleeding  Goal: Optimal Coping with Acute Illness  Outcome: Progressing  Goal: Hemostasis  Outcome: Progressing

## 2025-05-12 VITALS
HEART RATE: 69 BPM | RESPIRATION RATE: 18 BRPM | BODY MASS INDEX: 37.22 KG/M2 | WEIGHT: 274.81 LBS | DIASTOLIC BLOOD PRESSURE: 64 MMHG | SYSTOLIC BLOOD PRESSURE: 107 MMHG | OXYGEN SATURATION: 98 % | TEMPERATURE: 97 F | HEIGHT: 72 IN

## 2025-05-12 LAB
ANION GAP SERPL CALC-SCNC: 0 MEQ/L (ref 2–13)
BASOPHILS # BLD AUTO: 0.03 X10(3)/MCL (ref 0.01–0.08)
BASOPHILS NFR BLD AUTO: 0.3 % (ref 0.1–1.2)
BEAKER SEE SCANNED REPORT: NORMAL
BUN SERPL-MCNC: 16 MG/DL (ref 7–20)
CALCIUM SERPL-MCNC: 8.9 MG/DL (ref 8.4–10.2)
CHLORIDE SERPL-SCNC: 105 MMOL/L (ref 98–110)
CO2 SERPL-SCNC: 32 MMOL/L (ref 21–32)
CREAT SERPL-MCNC: 0.76 MG/DL (ref 0.66–1.25)
CREAT/UREA NIT SERPL: 21 (ref 12–20)
EOSINOPHIL # BLD AUTO: 0.06 X10(3)/MCL (ref 0.04–0.54)
EOSINOPHIL NFR BLD AUTO: 0.6 % (ref 0.7–7)
ERYTHROCYTE [DISTWIDTH] IN BLOOD BY AUTOMATED COUNT: 20.4 %
GFR SERPLBLD CREATININE-BSD FMLA CKD-EPI: >90 ML/MIN/1.73/M2
GLUCOSE SERPL-MCNC: 155 MG/DL (ref 70–115)
H. PYLORI STOOL: NEGATIVE
HCT VFR BLD AUTO: 32.5 % (ref 36–52)
HGB BLD-MCNC: 8.7 G/DL (ref 13–18)
IMM GRANULOCYTES # BLD AUTO: 0.49 X10(3)/MCL (ref 0–0.03)
IMM GRANULOCYTES NFR BLD AUTO: 4.7 % (ref 0–0.5)
LYMPHOCYTES # BLD AUTO: 1.19 X10(3)/MCL (ref 1.32–3.57)
LYMPHOCYTES NFR BLD AUTO: 11.5 % (ref 20–55)
MCH RBC QN AUTO: 22.7 PG (ref 27–34)
MCHC RBC AUTO-ENTMCNC: 26.8 G/DL (ref 31–37)
MCV RBC AUTO: 84.6 FL (ref 79–99)
MONOCYTES # BLD AUTO: 1.29 X10(3)/MCL (ref 0.3–0.82)
MONOCYTES NFR BLD AUTO: 12.5 % (ref 4.7–12.5)
NEUTROPHILS # BLD AUTO: 7.3 X10(3)/MCL (ref 1.78–5.38)
NEUTROPHILS NFR BLD AUTO: 70.4 % (ref 37–73)
PLATELET # BLD AUTO: 135 X10(3)/MCL (ref 140–371)
PMV BLD AUTO: 10.4 FL (ref 9.4–12.4)
POCT GLUCOSE: 167 MG/DL (ref 70–110)
POCT GLUCOSE: 180 MG/DL (ref 70–110)
POTASSIUM SERPL-SCNC: 4.4 MMOL/L (ref 3.5–5.1)
RBC # BLD AUTO: 3.84 X10(6)/MCL (ref 4–6)
SARS-COV-2 RNA RESP QL NAA+PROBE: NOT DETECTED
SODIUM SERPL-SCNC: 137 MMOL/L (ref 136–145)
WBC # BLD AUTO: 10.36 X10(3)/MCL (ref 4–11.5)

## 2025-05-12 PROCEDURE — 80048 BASIC METABOLIC PNL TOTAL CA: CPT | Performed by: FAMILY MEDICINE

## 2025-05-12 PROCEDURE — 94799 UNLISTED PULMONARY SVC/PX: CPT

## 2025-05-12 PROCEDURE — 94660 CPAP INITIATION&MGMT: CPT

## 2025-05-12 PROCEDURE — 36415 COLL VENOUS BLD VENIPUNCTURE: CPT | Performed by: FAMILY MEDICINE

## 2025-05-12 PROCEDURE — 99900035 HC TECH TIME PER 15 MIN (STAT)

## 2025-05-12 PROCEDURE — 27100171 HC OXYGEN HIGH FLOW UP TO 24 HOURS

## 2025-05-12 PROCEDURE — 85025 COMPLETE CBC W/AUTO DIFF WBC: CPT | Performed by: FAMILY MEDICINE

## 2025-05-12 PROCEDURE — 87635 SARS-COV-2 COVID-19 AMP PRB: CPT | Performed by: FAMILY MEDICINE

## 2025-05-12 PROCEDURE — 94761 N-INVAS EAR/PLS OXIMETRY MLT: CPT

## 2025-05-12 PROCEDURE — 25000003 PHARM REV CODE 250

## 2025-05-12 PROCEDURE — 25000003 PHARM REV CODE 250: Performed by: FAMILY MEDICINE

## 2025-05-12 RX ORDER — SUCRALFATE 1 G/1
1 TABLET ORAL
Start: 2025-05-12

## 2025-05-12 RX ORDER — IRON,CARBONYL/ASCORBIC ACID 100-250 MG
1 TABLET ORAL DAILY
Start: 2025-05-12

## 2025-05-12 RX ORDER — PANTOPRAZOLE SODIUM 40 MG/1
40 TABLET, DELAYED RELEASE ORAL DAILY
Start: 2025-05-13 | End: 2026-05-13

## 2025-05-12 RX ADMIN — MICONAZOLE NITRATE 2 % TOPICAL POWDER: at 08:05

## 2025-05-12 RX ADMIN — ATORVASTATIN CALCIUM 80 MG: 40 TABLET, FILM COATED ORAL at 08:05

## 2025-05-12 RX ADMIN — HYDROCODONE BITARTRATE AND ACETAMINOPHEN 1 TABLET: 5; 325 TABLET ORAL at 06:05

## 2025-05-12 RX ADMIN — METOPROLOL TARTRATE 12.5 MG: 25 TABLET, FILM COATED ORAL at 08:05

## 2025-05-12 RX ADMIN — FAMOTIDINE 20 MG: 20 TABLET, FILM COATED ORAL at 08:05

## 2025-05-12 RX ADMIN — FOLIC ACID 1 MG: 1 TABLET ORAL at 08:05

## 2025-05-12 RX ADMIN — GABAPENTIN 300 MG: 300 CAPSULE ORAL at 08:05

## 2025-05-12 RX ADMIN — SERTRALINE HYDROCHLORIDE 100 MG: 50 TABLET ORAL at 08:05

## 2025-05-12 RX ADMIN — FENOFIBRATE 145 MG: 145 TABLET ORAL at 08:05

## 2025-05-12 RX ADMIN — SUCRALFATE 1 G: 1 TABLET ORAL at 12:05

## 2025-05-12 RX ADMIN — EMPAGLIFLOZIN 10 MG: 10 TABLET, FILM COATED ORAL at 08:05

## 2025-05-12 RX ADMIN — PANTOPRAZOLE SODIUM 40 MG: 40 TABLET, DELAYED RELEASE ORAL at 08:05

## 2025-05-12 RX ADMIN — SUCRALFATE 1 G: 1 TABLET ORAL at 05:05

## 2025-05-12 NOTE — ASSESSMENT & PLAN NOTE
Patient's FSGs are controlled on current medication regimen.  Last A1c reviewed-   Lab Results   Component Value Date    HGBA1C 7.7 (H) 11/14/2024     Most recent fingerstick glucose reviewed-   Recent Labs   Lab 05/11/25  1622 05/11/25  1935 05/12/25  0806 05/12/25  1100   POCTGLUCOSE 176* 213* 167* 180*     Current correctional scale  Low  Maintain anti-hyperglycemic dose as follows-   Antihyperglycemics (From admission, onward)      Start     Stop Route Frequency Ordered    05/06/25 0900  empagliflozin (Jardiance) tablet 10 mg        Question Answer Comment   Does this patient have a diagnosis of heart failure? Yes    Does this patient have type 1 diabetes or diabetic ketoacidosis? No    Does this patient have symptomatic hypotension? No    Is the patient NPO or pending major surgery in next 3 days or less? No        -- Oral Daily 05/05/25 1937 05/06/25 0746  insulin aspart U-100 pen 0-5 Units         -- SubQ Before meals & nightly PRN 05/06/25 0647          Hold Oral hypoglycemics while patient is in the hospital.

## 2025-05-12 NOTE — ASSESSMENT & PLAN NOTE
Patient's blood pressure range in the last 24 hours was: BP  Min: 100/53  Max: 124/75.The patient's inpatient anti-hypertensive regimen is listed below:  Current Antihypertensives  Resume metoprolol toaday

## 2025-05-12 NOTE — NURSING
Called report on patient to ELIANA Ribeiro, at [facility]. Allowed time to ask and answer all questions.

## 2025-05-12 NOTE — PLAN OF CARE
Problem: Adult Inpatient Plan of Care  Goal: Plan of Care Review  Outcome: Met  Goal: Patient-Specific Goal (Individualized)  Outcome: Met  Goal: Absence of Hospital-Acquired Illness or Injury  Outcome: Met  Goal: Optimal Comfort and Wellbeing  Outcome: Met  Goal: Readiness for Transition of Care  Outcome: Met     Problem: Skin Injury Risk Increased  Goal: Skin Health and Integrity  Outcome: Met     Problem: Anemia  Goal: Anemia Symptom Improvement  Outcome: Met     Problem: Fall Injury Risk  Goal: Absence of Fall and Fall-Related Injury  Outcome: Met     Problem: Diabetes Comorbidity  Goal: Blood Glucose Level Within Targeted Range  Outcome: Met     Problem: Wound  Goal: Optimal Coping  Outcome: Met  Goal: Optimal Functional Ability  Outcome: Met  Goal: Absence of Infection Signs and Symptoms  Outcome: Met  Goal: Improved Oral Intake  Outcome: Met  Goal: Optimal Pain Control and Function  Outcome: Met  Goal: Skin Health and Integrity  Outcome: Met  Goal: Optimal Wound Healing  Outcome: Met     Problem: Bleeding Risk or Actual (Thrombocytopenia)  Goal: Absence of Bleeding  Outcome: Met     Problem: Gastrointestinal Bleeding  Goal: Optimal Coping with Acute Illness  Outcome: Met  Goal: Hemostasis  Outcome: Met

## 2025-05-12 NOTE — PLAN OF CARE
Physician ordered to discharge patient back to [facility].  [facility] was notified per phone/fax of pt's discharge, spoke with Kelley Miller. Nursing advised to call report to [facility] @ [phone].

## 2025-05-12 NOTE — PROGRESS NOTES
[facility]-Med/Surg  Wound Care    Patient Name:  Onesimo Booth   MRN:  [id number]  Date: 5/12/2025  Diagnosis: Acute blood loss anemia    History:     Past Medical History:   Diagnosis Date    Anticoagulant long-term use     Anxiety disorder, unspecified     Aphasia     CVA (cerebral vascular accident)     Depression     Diabetes mellitus     GERD (gastroesophageal reflux disease)     Hemiplegia     HTN (hypertension)     Mixed hyperlipidemia     Stroke     Unspecified atrial flutter        Social History[1]    Precautions:     Allergies as of 05/05/2025    (No Known Allergies)       WOC Assessment Details/Treatment        05/12/25 1151   WOCN Assessment   WOCN Total Time (mins) 15   Visit Date 05/12/25   Visit Time 1140   Consult Type Follow Up   Intervention assessed   Skin Interventions   Pressure Reduction Devices pressure-redistributing mattress utilized;heel offloading device utilized   Pressure Reduction Techniques heels elevated off bed;weight shift assistance provided   Positioning   Positioning/Transfer Devices pillows;wedge;in use   Pressure Injury Prevention    Check Moisture Management Pad Done   Sacral Foam Dressing Peel back sacral foam dressing, assess skin and reapply   Heel protection technique Pillow   Heel preventative measures Peel back dressing/boot, assess skin and reapply   Check Medical Devices Done        Wound 05/12/25 1150 Other (comment) Right anterior;lower Leg   Date First Assessed/Time First Assessed: 05/12/25 1150   Primary Wound Type: Other (comment)  Side: Right  Orientation: anterior;lower  Location: Leg   Wound Image    Dressing Appearance Open to air   Drainage Amount Small   Drainage Characteristics/Odor Purulent;Serosanguineous;No odor   Appearance Pink;Moist   Tissue loss description Partial thickness   Periwound Area Redness  (rashy like skin noted)   Care Cleansed with:;Sterile normal saline   Dressing Applied;Non-adherent   Dressing Change Due 05/13/25      Orders placed.     05/12/2025         [1]   Social History  Socioeconomic History    Marital status: Single   Tobacco Use    Smoking status: Former     Types: Cigarettes   Substance and Sexual Activity    Alcohol use: Not Currently    Drug use: Not Currently     Social Drivers of Health     Financial Resource Strain: Low Risk  (5/6/2025)    Overall Financial Resource Strain (CARDIA)     Difficulty of Paying Living Expenses: Not very hard   Food Insecurity: No Food Insecurity (5/6/2025)    Hunger Vital Sign     Worried About Running Out of Food in the Last Year: Never true     Ran Out of Food in the Last Year: Never true   Transportation Needs: No Transportation Needs (5/6/2025)    PRAPARE - Transportation     Lack of Transportation (Medical): No     Lack of Transportation (Non-Medical): No   Physical Activity: Unknown (5/6/2025)    Exercise Vital Sign     Days of Exercise per Week: 0 days   Stress: No Stress Concern Present (5/6/2025)    [employer] - Occupational Stress Questionnaire     Feeling of Stress : Not at all   Housing Stability: Low Risk  (5/6/2025)    Housing Stability Vital Sign     Unable to Pay for Housing in the Last Year: No     Number of Times Moved in the Last Year: 1     Homeless in the Last Year: No

## 2025-05-12 NOTE — DISCHARGE SUMMARY
[facility]/Surg  [facility] Medicine  Discharge Summary      Patient Name: Onesimo Booth  MRN: [id number]  SUKHWINDER: [id number]  Patient Class: IP- Inpatient  Admission Date: 5/5/2025  Hospital Length of Stay: 6 days  Discharge Date and Time: No discharge date for patient encounter.  Attending Physician: Paz Palm MD   Discharging Provider: Paz Palm MD  Primary Care Provider: Denys Mckeon III, MD    Primary Care Team: Networked reference to record PCT     HPI:   69-year-old male with hx of NIDDM, PVD Left BKA, HTN, Left CEA resident of NH on plavix and Eliquis not sure why was sent for abnormal lab and generalize weakness.  He was found on a CBC earlier today to be anemic, with a hemoglobin of 6.5.  This is new for him.  Denies Hematochezia or melena, no endoscopy reported he is accompanied by his daughter at bedside. Pt is Poor historian. Surgery consulted for scope, Pt is getting one unit of PRBC and protonix per ED    * No surgery found *      Hospital Course:    05/06/2025 pt admitted with GI bleed.  S/p 1 U PRBCS and hgb at 7.5 stable last several draws.    05/07/2025 pt s/p EGD shows ulver of duodenum,.  Multiple polyps removed, lipoma and diverticuli and hemorrhoids. Pt H&H stable after 1 U and no c/o at present  05/08/2025 pt with GI bleed, some SOB not resolved with transfusion, still requires oxygen sats 89-90% on RA at rest.  Has not been on oxygen in the past, last echo [facility] 06/2024 EF was normal.  Pt denies SOB and denies chest pain, no cough or fever or WBC  CXR this am mild radha pulmonary edema, chronic RLE and RUE edema iproved since admit.    Will get echo and continue to wean oxygen as tolerated.  May need prn oxygen at the nursing home.  05/09/2025 admitted with GIB, still with SOB and requries oxygen, currently on 1-2L daughter says he dropped in the 70s while sleeping, does not hear him snoring, nurse documents sats in the 90s.  CXR did show some mild pulmonary  edema, gave lasix yesterday, BP low this am reviewed nursing home mar and apparently they had stopped hydralazine the day he was sent here due to lower BP there.  BP meds have been held offf and on last 2-3 days. Gave 500cc NS bolus this am, he is awake and alert at his mental status baseline.  Anemia is iron deficiency and he is received ferrlicit x 3 doses.  Echo looks stable from old echo from [facility] back in 2023.  Daughter reports he seems to choke and cough with eating at times and this is new.  SLP has been consulted.  05/10/2025 SLP plans for MBS on Monday, pt still with cough, nurse reports eating well no choking or coughin and he is swallowing his pills whole, sats are 97% on 2L, will wean oxygen.  VM placed at 0100 due to sats at 87%  05/11/2025 tolerated BIPAP last night and no desaturations,tolerating diet with no choking.  Awaiting MBS for Monday no bedside evidence of dysphagia.  Other labs stable.  05/12/2025 DISCHARGE SUMMARY: pt admitted with GI bleed, s/p 1 U PRBC, found to have ulcer and hiatal hernia with polpys in colon on EGD and colonoscopy.  Pt was having some SOB, Echo looks stable since 2023, CXR no infiltrates.  Pt also received 3 doses of ferlicit.  He was evaluated by SLP due to some reported coughing with meals by the daughter, he takes his medications and meals without difficulty per nursing staff.  SLP evaluated and does not feel he has any swallowing issues. He is ready for d/c back to the nursing home.     Goals of Care Treatment Preferences:  Code Status: Full Code      SDOH Screening:  The patient was screened for utility difficulties, food insecurity, transport difficulties, housing insecurity, and interpersonal safety and there were no concerns identified this admission.     Consults:   Consults (From admission, onward)          Status Ordering Provider     Inpatient consult to General Surgery  Once        Provider:  Jose Cruz Guzman MD    Acknowledged KERRI GAGE      Inpatient virtual consult to Hospital Medicine  Once        Provider:  (Not yet assigned)    Acknowledged KERRI GAGE            Assessment & Plan  Acute anemia    S/p 1 U PRBC 05/05/2025  Monitor H&H  Candaceyfolljuaning  Ferrlicit x 3 doses  H&H has been stable    Coagulopathy  Continue to hold plavix and asa  S//p EGD/Colonoscopy    Obesity  Body mass index is 37.27 kg/m². Morbid obesity complicates all aspects of disease management from diagnostic modalities to treatment. Weight loss encouraged and health benefits explained to patient.       Thrombocytopenia  The likely etiology of thrombocytopenia is unknown. The patients 3 most recent labs are listed below.  Recent Labs     05/11/25 0421 05/12/25 0413    135*     Plan  - Will transfuse if platelet count is <50k (if undergoing surgical procedure or have active bleeding).  -      GI bleed  Patient's hemorrhage is due to gastrointestinal bleed, this bleeding is associated with an anticoagulant, the anticoagulant is Select Anticoagulant(s): plavix, asa. Patients most recent Hgb, Hct, platelets, and INR are listed below.  Recent Labs     05/11/25 0421 05/12/25 0413   HGB 8.2* 8.7*   HCT 30.7* 32.5*    135*     Plan  - Will trend hemoglobin/hematocrit Every 6 hours  - Will monitor and correct any coagulation defects  - Will transfuse if Hgb is <7g/dl (<8g/dl in cases of active ACS) or if patient has rapid bleeding leading to hemodynamic instability  -  s/p 1 U PRBC  S/p egd and colonosocopy 05/07/2025  Acute blood loss anemia  Anemia is likely due to acute blood loss which was from GI and Iron deficiency. Most recent hemoglobin and hematocrit are listed below.  Recent Labs     05/11/25 0421 05/12/25 0413   HGB 8.2* 8.7*   HCT 30.7* 32.5*     Plan  - Monitor serial CBC: Every 6 hours  - Transfuse PRBC if patient becomes hemodynamically unstable, symptomatic or H/H drops below 7/21.  - Patient has received 1 units of PRBCs on 05/05/2025  -  Patient's anemia is currently stable  -  ferrlicit x 3 due to CL  Controlled type 2 diabetes mellitus, without long-term current use of insulin  Patient's FSGs are controlled on current medication regimen.  Last A1c reviewed-   Lab Results   Component Value Date    HGBA1C 7.7 (H) 11/14/2024     Most recent fingerstick glucose reviewed-   Recent Labs   Lab 05/11/25  1622 05/11/25  1935 05/12/25  0806 05/12/25  1100   POCTGLUCOSE 176* 213* 167* 180*     Current correctional scale  Low  Maintain anti-hyperglycemic dose as follows-   Antihyperglycemics (From admission, onward)      Start     Stop Route Frequency Ordered    05/06/25 0900  empagliflozin (Jardiance) tablet 10 mg        Question Answer Comment   Does this patient have a diagnosis of heart failure? Yes    Does this patient have type 1 diabetes or diabetic ketoacidosis? No    Does this patient have symptomatic hypotension? No    Is the patient NPO or pending major surgery in next 3 days or less? No        -- Oral Daily 05/05/25 1937    05/06/25 0746  insulin aspart U-100 pen 0-5 Units         -- SubQ Before meals & nightly PRN 05/06/25 0647          Hold Oral hypoglycemics while patient is in the hospital.      Primary hypertension  Patient's blood pressure range in the last 24 hours was: BP  Min: 100/53  Max: 124/75.The patient's inpatient anti-hypertensive regimen is listed below:  Current Antihypertensives  Resume metoprolol toaday  Duodenal ulcer  Pepcid, protonix careafate  Check h pylori stool collected this am result is pending    Diverticulosis  On colonoscopy    Lipoma of colon  On colonoscopy  S/p removal    Colon polyps  On colonoscopy  S/p removal  F/u with Dr. Guzman for path    Hemorrhoid  On colonoscopy  F/u with Dr. Guzman post d/c    Dysphagia  Consult SLP    Final Active Diagnoses:    Diagnosis Date Noted POA    PRINCIPAL PROBLEM:  Acute blood loss anemia [D62] 05/06/2025 Yes    GI bleed [K92.2] 05/06/2025 Yes    Dysphagia [R13.10]  05/09/2025 Yes    Duodenal ulcer [K26.9] 05/07/2025 Yes    Diverticulosis [K57.90] 05/07/2025 Yes    Lipoma of colon [D17.5] 05/07/2025 Yes    Colon polyps [K63.5] 05/07/2025 Yes    Hemorrhoid [K64.9] 05/07/2025 Yes    Obesity [E66.9] 05/06/2025 Yes    Thrombocytopenia [D69.6] 05/06/2025 Yes    Controlled type 2 diabetes mellitus, without long-term current use of insulin [E11.9] 05/06/2025 Yes    Primary hypertension [I10] 05/06/2025 Yes    Coagulopathy [D68.9] 05/05/2025 Yes    Acute anemia [D64.9] 05/05/2025 Yes      Problems Resolved During this Admission:       Discharged Condition: good    Disposition: Another Health Care Inst*    Follow Up:   Follow-up Information       Denys Mckeon III, MD Follow up.    Specialty: Family Medicine  Contact information:  [facility] [address] [place] [address]  [phone]               Jose Cruz Guzman MD Follow up.    Specialties: General Surgery, Cardiology  Contact information:  [facility] [address] [place] [id number]  [phone]                           Patient Instructions:      Activity as tolerated       Significant Diagnostic Studies: Labs: CMP   Recent Labs   Lab 05/11/25  0431 05/12/25  0413    137   K 4.0 4.4    105   CO2 33* 32   * 155*   BUN 14 16   CREATININE 0.63* 0.76   CALCIUM 8.5 8.9    and CBC   Recent Labs   Lab 05/11/25  0421 05/12/25  0413   WBC 7.04 10.36   HGB 8.2* 8.7*   HCT 30.7* 32.5*    135*       Pending Diagnostic Studies:       Procedure Component Value Units Date/Time    COVID-19 Rapid Screening [[id number]]     Order Status: Sent Lab Status: No result     Specimen: Nasal Swab     Helicobacter Pylori Antigen Fecal EIA [[id number]] Collected: 05/11/25 0927    Order Status: Sent Lab Status: In process Updated: 05/11/25 0938    Specimen: Stool     Specimen to Pathology Gastrointestinal tract [[id number]] Collected: 05/07/25 1057    Order Status: Sent Lab Status: In process Updated: 05/07/25 1155    Specimen:  Tissue            Medications:  Reconciled Home Medications:      Medication List        START taking these medications      iron-vitamin C 100-250 mg (ICAR-C) 100-250 mg Tab  Take 1 tablet by mouth Daily.     pantoprazole 40 MG tablet  Commonly known as: PROTONIX  Take 1 tablet (40 mg total) by mouth once daily.  Start taking on: May 13, 2025     sucralfate 1 gram tablet  Commonly known as: CARAFATE  Take 1 tablet (1 g total) by mouth 4 (four) times daily before meals and nightly.            CONTINUE taking these medications      amLODIPine 2.5 MG tablet  Commonly known as: NORVASC  Take 10 mg by mouth once daily.     clopidogreL 75 mg tablet  Commonly known as: PLAVIX  Take 75 mg by mouth once daily.     ELIQUIS 5 mg Tab  Generic drug: apixaban  Take 5 mg by mouth 2 (two) times daily.     famotidine 20 MG tablet  Commonly known as: PEPCID  Take 20 mg by mouth 2 (two) times daily.     fenofibrate 160 MG Tab  Take 160 mg by mouth nightly.     folic acid 1 MG tablet  Commonly known as: FOLVITE  Take 1 mg by mouth once daily.     furosemide 40 MG tablet  Commonly known as: LASIX  Take 40 mg by mouth once daily.     gabapentin 300 MG capsule  Commonly known as: NEURONTIN  Take 300 mg by mouth 3 (three) times daily.     HYDROcodone-acetaminophen 5-325 mg per tablet  Commonly known as: NORCO  Take 1 tablet by mouth every 6 (six) hours as needed for Pain.     JARDIANCE 10 mg tablet  Generic drug: empagliflozin  Take 10 mg by mouth once daily.     lisinopriL 40 MG tablet  Commonly known as: PRINIVIL,ZESTRIL  Take 40 mg by mouth once daily.     metFORMIN 1000 MG tablet  Commonly known as: GLUCOPHAGE  Take 1,000 mg by mouth 2 (two) times daily with meals.     metoprolol tartrate 25 MG tablet  Commonly known as: LOPRESSOR  Take 12.5 mg by mouth 2 (two) times daily.     rosuvastatin 20 MG tablet  Commonly known as: CRESTOR  Take 20 mg by mouth every evening.     sertraline 100 MG tablet  Commonly known as: ZOLOFT  Take 100 mg  by mouth once daily.     traZODone 50 MG tablet  Commonly known as: DESYREL  Take 50 mg by mouth every evening.     VITAMIN D2 50,000 unit Cap  Generic drug: ergocalciferol  Take 50,000 Units by mouth every Tues, Fri.              Indwelling Lines/Drains at time of discharge:   Lines/Drains/Airways       Drain  Duration             Male External Urinary Catheter 05/05/25 2100 6 days                    Time spent on the discharge of patient: 36 minutes    Patient Screened for food insecurity, housing instability, transportation needs, utility difficulties, and interpersonal safety.  No needs identified     Physical Exam  Vitals and nursing note reviewed.   Constitutional:       General: He is not in acute distress.     Appearance: Normal appearance. He is normal weight. He is not ill-appearing.   Cardiovascular:      Rate and Rhythm: Normal rate and regular rhythm.      Pulses: Normal pulses.      Heart sounds: Normal heart sounds.   Pulmonary:      Effort: Pulmonary effort is normal.      Breath sounds: Normal breath sounds.   Abdominal:      General: Abdomen is flat. Bowel sounds are normal.      Palpations: Abdomen is soft.   Musculoskeletal:      Right lower leg: No edema.      Left lower leg: No edema.   Skin:     General: Skin is warm and dry.      Findings: No erythema or rash.   Neurological:      Mental Status: He is alert.   Psychiatric:      Comments: I had a face to face encounter with this patient prior to discharging           Paz Palm MD  Department of Hospital Medicine  [facility]/Surg

## 2025-05-12 NOTE — PLAN OF CARE
05/12/25 1109   Final Note   Assessment Type Final Discharge Note   Anticipated Discharge Disposition [facility]   What phone number can be called within the next 1-3 days to see how you are doing after discharge? [phone]   Post-Acute Status   Coverage medicare   Discharge Delays None known at this time

## 2025-05-12 NOTE — ASSESSMENT & PLAN NOTE
Patient's hemorrhage is due to gastrointestinal bleed, this bleeding is associated with an anticoagulant, the anticoagulant is [employer] Anticoagulant(s): plavix, asa. Patients most recent Hgb, Hct, platelets, and INR are listed below.  Recent Labs     05/11/25  0421 05/12/25  0413   HGB 8.2* 8.7*   HCT 30.7* 32.5*    135*     Plan  - Will trend hemoglobin/hematocrit Every 6 hours  - Will monitor and correct any coagulation defects  - Will transfuse if Hgb is <7g/dl (<8g/dl in cases of active ACS) or if patient has rapid bleeding leading to hemodynamic instability  -  s/p 1 U PRBC  S/p egd and colonosocopy 05/07/2025

## 2025-05-12 NOTE — ASSESSMENT & PLAN NOTE
Anemia is likely due to acute blood loss which was from GI and Iron deficiency. Most recent hemoglobin and hematocrit are listed below.  Recent Labs     05/11/25  0421 05/12/25  0413   HGB 8.2* 8.7*   HCT 30.7* 32.5*     Plan  - Monitor serial CBC: Every 6 hours  - Transfuse PRBC if patient becomes hemodynamically unstable, symptomatic or H/H drops below 7/21.  - Patient has received 1 units of PRBCs on 05/05/2025  - Patient's anemia is currently stable  -  ferrlicit x 3 due to CL

## 2025-05-12 NOTE — ASSESSMENT & PLAN NOTE
The likely etiology of thrombocytopenia is unknown. The patients 3 most recent labs are listed below.  Recent Labs     05/11/25  0421 05/12/25  0413    135*     Plan  - Will transfuse if platelet count is <50k (if undergoing surgical procedure or have active bleeding).  -

## 2025-05-23 PROBLEM — D50.9 IRON DEFICIENCY ANEMIA, UNSPECIFIED: Status: ACTIVE | Noted: 2025-05-23

## 2025-06-06 ENCOUNTER — INFUSION (OUTPATIENT)
Facility: HOSPITAL | Age: 70
End: 2025-06-06
Attending: FAMILY MEDICINE
Payer: MEDICARE

## 2025-06-06 VITALS
HEART RATE: 69 BPM | TEMPERATURE: 97 F | OXYGEN SATURATION: 98 % | RESPIRATION RATE: 18 BRPM | DIASTOLIC BLOOD PRESSURE: 64 MMHG | SYSTOLIC BLOOD PRESSURE: 115 MMHG

## 2025-06-06 DIAGNOSIS — D50.9 IRON DEFICIENCY ANEMIA, UNSPECIFIED IRON DEFICIENCY ANEMIA TYPE: Primary | ICD-10-CM

## 2025-06-06 PROCEDURE — 63600175 PHARM REV CODE 636 W HCPCS: Performed by: FAMILY MEDICINE

## 2025-06-06 PROCEDURE — 96374 THER/PROPH/DIAG INJ IV PUSH: CPT

## 2025-06-06 RX ORDER — SODIUM CHLORIDE 0.9 % (FLUSH) 0.9 %
10 SYRINGE (ML) INJECTION
OUTPATIENT
Start: 2025-06-13

## 2025-06-06 RX ORDER — EPINEPHRINE 0.3 MG/.3ML
0.3 INJECTION SUBCUTANEOUS ONCE AS NEEDED
OUTPATIENT
Start: 2025-06-13

## 2025-06-06 RX ORDER — SODIUM CHLORIDE 0.9 % (FLUSH) 0.9 %
10 SYRINGE (ML) INJECTION
Status: DISCONTINUED | OUTPATIENT
Start: 2025-06-06 | End: 2025-06-06 | Stop reason: HOSPADM

## 2025-06-06 RX ORDER — HEPARIN 100 UNIT/ML
500 SYRINGE INTRAVENOUS
OUTPATIENT
Start: 2025-06-13

## 2025-06-06 RX ORDER — HEPARIN 100 UNIT/ML
500 SYRINGE INTRAVENOUS
Status: DISCONTINUED | OUTPATIENT
Start: 2025-06-06 | End: 2025-06-06 | Stop reason: HOSPADM

## 2025-06-06 RX ADMIN — IRON SUCROSE 200 MG: 20 INJECTION, SOLUTION INTRAVENOUS at 08:06

## 2025-06-13 ENCOUNTER — INFUSION (OUTPATIENT)
Facility: HOSPITAL | Age: 70
End: 2025-06-13
Attending: FAMILY MEDICINE
Payer: MEDICARE

## 2025-06-13 VITALS
TEMPERATURE: 98 F | DIASTOLIC BLOOD PRESSURE: 74 MMHG | SYSTOLIC BLOOD PRESSURE: 122 MMHG | HEART RATE: 78 BPM | OXYGEN SATURATION: 98 % | RESPIRATION RATE: 18 BRPM

## 2025-06-13 DIAGNOSIS — D50.9 IRON DEFICIENCY ANEMIA, UNSPECIFIED IRON DEFICIENCY ANEMIA TYPE: Primary | ICD-10-CM

## 2025-06-13 PROCEDURE — 96374 THER/PROPH/DIAG INJ IV PUSH: CPT

## 2025-06-13 PROCEDURE — 63600175 PHARM REV CODE 636 W HCPCS: Performed by: FAMILY MEDICINE

## 2025-06-13 RX ORDER — EPINEPHRINE 0.3 MG/.3ML
0.3 INJECTION SUBCUTANEOUS ONCE AS NEEDED
OUTPATIENT
Start: 2025-06-20

## 2025-06-13 RX ORDER — HEPARIN 100 UNIT/ML
500 SYRINGE INTRAVENOUS
OUTPATIENT
Start: 2025-06-20

## 2025-06-13 RX ORDER — SODIUM CHLORIDE 0.9 % (FLUSH) 0.9 %
10 SYRINGE (ML) INJECTION
OUTPATIENT
Start: 2025-06-20

## 2025-06-13 RX ADMIN — IRON SUCROSE 200 MG: 20 INJECTION, SOLUTION INTRAVENOUS at 08:06

## 2025-06-20 ENCOUNTER — INFUSION (OUTPATIENT)
Facility: HOSPITAL | Age: 70
End: 2025-06-20
Attending: FAMILY MEDICINE
Payer: MEDICARE

## 2025-06-20 VITALS
HEART RATE: 103 BPM | SYSTOLIC BLOOD PRESSURE: 142 MMHG | TEMPERATURE: 98 F | OXYGEN SATURATION: 98 % | DIASTOLIC BLOOD PRESSURE: 68 MMHG

## 2025-06-20 DIAGNOSIS — D50.9 IRON DEFICIENCY ANEMIA, UNSPECIFIED IRON DEFICIENCY ANEMIA TYPE: Primary | ICD-10-CM

## 2025-06-20 PROCEDURE — 96374 THER/PROPH/DIAG INJ IV PUSH: CPT

## 2025-06-20 PROCEDURE — 63600175 PHARM REV CODE 636 W HCPCS: Performed by: FAMILY MEDICINE

## 2025-06-20 RX ORDER — SODIUM CHLORIDE 0.9 % (FLUSH) 0.9 %
10 SYRINGE (ML) INJECTION
OUTPATIENT
Start: 2025-06-27

## 2025-06-20 RX ORDER — SODIUM CHLORIDE 0.9 % (FLUSH) 0.9 %
10 SYRINGE (ML) INJECTION
Status: DISCONTINUED | OUTPATIENT
Start: 2025-06-20 | End: 2025-06-20 | Stop reason: HOSPADM

## 2025-06-20 RX ORDER — EPINEPHRINE 0.3 MG/.3ML
0.3 INJECTION SUBCUTANEOUS ONCE AS NEEDED
OUTPATIENT
Start: 2025-06-27

## 2025-06-20 RX ORDER — HEPARIN 100 UNIT/ML
500 SYRINGE INTRAVENOUS
OUTPATIENT
Start: 2025-06-27

## 2025-06-20 RX ORDER — HEPARIN 100 UNIT/ML
500 SYRINGE INTRAVENOUS
Status: DISCONTINUED | OUTPATIENT
Start: 2025-06-20 | End: 2025-06-20 | Stop reason: HOSPADM

## 2025-06-20 RX ADMIN — IRON SUCROSE 200 MG: 20 INJECTION, SOLUTION INTRAVENOUS at 08:06

## 2025-06-27 ENCOUNTER — INFUSION (OUTPATIENT)
Facility: HOSPITAL | Age: 70
End: 2025-06-27
Attending: FAMILY MEDICINE
Payer: MEDICARE

## 2025-06-27 VITALS
RESPIRATION RATE: 18 BRPM | HEART RATE: 81 BPM | TEMPERATURE: 98 F | OXYGEN SATURATION: 95 % | DIASTOLIC BLOOD PRESSURE: 67 MMHG | SYSTOLIC BLOOD PRESSURE: 117 MMHG

## 2025-06-27 DIAGNOSIS — D50.9 IRON DEFICIENCY ANEMIA, UNSPECIFIED IRON DEFICIENCY ANEMIA TYPE: Primary | ICD-10-CM

## 2025-06-27 PROCEDURE — 63600175 PHARM REV CODE 636 W HCPCS: Performed by: FAMILY MEDICINE

## 2025-06-27 PROCEDURE — 96374 THER/PROPH/DIAG INJ IV PUSH: CPT

## 2025-06-27 RX ORDER — HEPARIN 100 UNIT/ML
500 SYRINGE INTRAVENOUS
OUTPATIENT
Start: 2025-07-04

## 2025-06-27 RX ORDER — SODIUM CHLORIDE 0.9 % (FLUSH) 0.9 %
10 SYRINGE (ML) INJECTION
Status: DISCONTINUED | OUTPATIENT
Start: 2025-06-27 | End: 2025-06-27 | Stop reason: HOSPADM

## 2025-06-27 RX ORDER — HEPARIN 100 UNIT/ML
500 SYRINGE INTRAVENOUS
Status: DISCONTINUED | OUTPATIENT
Start: 2025-06-27 | End: 2025-06-27 | Stop reason: HOSPADM

## 2025-06-27 RX ORDER — EPINEPHRINE 0.3 MG/.3ML
0.3 INJECTION SUBCUTANEOUS ONCE AS NEEDED
OUTPATIENT
Start: 2025-07-04

## 2025-06-27 RX ORDER — SODIUM CHLORIDE 0.9 % (FLUSH) 0.9 %
10 SYRINGE (ML) INJECTION
OUTPATIENT
Start: 2025-07-04

## 2025-06-27 RX ADMIN — IRON SUCROSE 200 MG: 20 INJECTION, SOLUTION INTRAVENOUS at 08:06

## 2025-07-03 ENCOUNTER — INFUSION (OUTPATIENT)
Facility: HOSPITAL | Age: 70
End: 2025-07-03
Attending: FAMILY MEDICINE
Payer: MEDICARE

## 2025-07-03 VITALS
OXYGEN SATURATION: 97 % | HEART RATE: 87 BPM | TEMPERATURE: 98 F | RESPIRATION RATE: 18 BRPM | SYSTOLIC BLOOD PRESSURE: 123 MMHG | DIASTOLIC BLOOD PRESSURE: 70 MMHG

## 2025-07-03 DIAGNOSIS — D50.9 IRON DEFICIENCY ANEMIA, UNSPECIFIED IRON DEFICIENCY ANEMIA TYPE: Primary | ICD-10-CM

## 2025-07-03 PROCEDURE — 96374 THER/PROPH/DIAG INJ IV PUSH: CPT

## 2025-07-03 PROCEDURE — 63600175 PHARM REV CODE 636 W HCPCS: Performed by: FAMILY MEDICINE

## 2025-07-03 RX ORDER — HEPARIN 100 UNIT/ML
500 SYRINGE INTRAVENOUS
OUTPATIENT
Start: 2025-07-04

## 2025-07-03 RX ORDER — SODIUM CHLORIDE 0.9 % (FLUSH) 0.9 %
10 SYRINGE (ML) INJECTION
Status: DISCONTINUED | OUTPATIENT
Start: 2025-07-03 | End: 2025-07-03 | Stop reason: HOSPADM

## 2025-07-03 RX ORDER — HEPARIN 100 UNIT/ML
500 SYRINGE INTRAVENOUS
Status: DISCONTINUED | OUTPATIENT
Start: 2025-07-03 | End: 2025-07-03 | Stop reason: HOSPADM

## 2025-07-03 RX ORDER — EPINEPHRINE 0.3 MG/.3ML
0.3 INJECTION SUBCUTANEOUS ONCE AS NEEDED
OUTPATIENT
Start: 2025-07-04

## 2025-07-03 RX ORDER — SODIUM CHLORIDE 0.9 % (FLUSH) 0.9 %
10 SYRINGE (ML) INJECTION
OUTPATIENT
Start: 2025-07-04

## 2025-07-03 RX ADMIN — IRON SUCROSE 200 MG: 20 INJECTION, SOLUTION INTRAVENOUS at 08:07

## 2025-08-21 DIAGNOSIS — I65.22 LEFT CAROTID ARTERY STENOSIS: Primary | ICD-10-CM

## 2025-08-28 ENCOUNTER — HOSPITAL ENCOUNTER (OUTPATIENT)
Dept: RADIOLOGY | Facility: HOSPITAL | Age: 70
Discharge: HOME | End: 2025-08-28
Attending: SURGERY
Payer: MEDICARE

## 2025-08-28 DIAGNOSIS — I65.22 LEFT CAROTID ARTERY STENOSIS: ICD-10-CM

## 2025-08-28 PROCEDURE — 70498 CT ANGIOGRAPHY NECK: CPT | Mod: TC

## 2025-08-28 PROCEDURE — 25500020 PHARM REV CODE 255: Performed by: SURGERY

## 2025-08-28 RX ADMIN — IOHEXOL 75 ML: 350 INJECTION, SOLUTION INTRAVENOUS at 03:08
